# Patient Record
Sex: FEMALE | Race: WHITE | NOT HISPANIC OR LATINO | Employment: OTHER | URBAN - METROPOLITAN AREA
[De-identification: names, ages, dates, MRNs, and addresses within clinical notes are randomized per-mention and may not be internally consistent; named-entity substitution may affect disease eponyms.]

---

## 2018-12-24 ENCOUNTER — OFFICE VISIT (OUTPATIENT)
Dept: URGENT CARE | Facility: CLINIC | Age: 68
End: 2018-12-24
Payer: MEDICARE

## 2018-12-24 VITALS
RESPIRATION RATE: 16 BRPM | DIASTOLIC BLOOD PRESSURE: 75 MMHG | OXYGEN SATURATION: 99 % | TEMPERATURE: 99 F | WEIGHT: 196.38 LBS | SYSTOLIC BLOOD PRESSURE: 113 MMHG | HEART RATE: 102 BPM | BODY MASS INDEX: 33.71 KG/M2

## 2018-12-24 DIAGNOSIS — B02.7 DISSEMINATED HERPES ZOSTER: Primary | ICD-10-CM

## 2018-12-24 DIAGNOSIS — G51.8 NEURALGIC FACIAL PAIN: ICD-10-CM

## 2018-12-24 PROCEDURE — 99204 OFFICE O/P NEW MOD 45 MIN: CPT | Mod: S$GLB,,, | Performed by: NURSE PRACTITIONER

## 2018-12-24 RX ORDER — GABAPENTIN 300 MG/1
300 CAPSULE ORAL 3 TIMES DAILY PRN
Qty: 45 CAPSULE | Refills: 0 | Status: SHIPPED | OUTPATIENT
Start: 2018-12-24 | End: 2019-01-22

## 2018-12-24 RX ORDER — FENOFIBRATE 54 MG/1
54 TABLET ORAL DAILY
COMMUNITY
End: 2021-01-26 | Stop reason: SDUPTHER

## 2018-12-24 RX ORDER — ACYCLOVIR 400 MG/1
800 TABLET ORAL
Qty: 35 TABLET | Refills: 0 | Status: SHIPPED | OUTPATIENT
Start: 2018-12-24 | End: 2019-01-22

## 2018-12-24 RX ORDER — LISINOPRIL 5 MG/1
5 TABLET ORAL NIGHTLY
COMMUNITY
End: 2021-01-26 | Stop reason: SDUPTHER

## 2018-12-24 RX ORDER — ROSUVASTATIN CALCIUM 10 MG/1
10 TABLET, COATED ORAL DAILY
COMMUNITY
End: 2019-01-22

## 2018-12-24 NOTE — PATIENT INSTRUCTIONS
Shingles  Shingles is a viral infection caused by the same virus as chicken pox. Anyone who has had chicken pox may get shingles later in life. The virus stays in the body, but remains dormant (asleep). Shingles often occurs in older persons or persons with lowered immunity. But it can affect anyone at any age.  Shingles starts as a tingling patch of skin on one side of the body. Small, painful blisters may then appear. The rash does not spread to the rest of the body.  Exposure to shingles cannot cause shingles. However, it can cause chicken pox in anyone who has not had chicken pox or has not been vaccinated. The contagious period ends when all blisters have crusted over (generally about 2 weeks after the illness begins).  After the blisters heal, the affected skin may be sensitive or painful for months (neuralgia). This often gradually goes away.  A shingles vaccine is available. This can help prevent shingles or make it less painful. It is generally recommended for adults over the age of 60 who have had chicken pox in the past, but who have never had shingles. Adults over 60 who have had neither chicken pox nor shingles can prevent both diseases with the chicken pox vaccine. Ask your healthcare provider about these vaccines.  Home care  · Medicines may be prescribed to help relieve pain. Take these medicines as directed. Ask your healthcare provider or pharmacist before using over-the-counter medicines for helping treat pain and itching.  · In certain cases, antiviral medicines may be prescribed to reduce pain, shorten the illness, and prevent neuralgia. Take these medicines as directed.  · Compresses made from a solution of cool water mixed with cornstarch or baking soda may help relieve pain and itching.   · Gently wash skin daily with soap and water to help prevent infection.  Be certain to rinse off all of the soap, which can be irritating.  · Trim fingernails and try not to scratch. Scratching the sores  may leave scars.  · Stay home from work or school until all blisters have formed a crust and you are no longer contagious.  Follow-up care  Follow up with your healthcare provider or as directed by our staff.  When to seek medical advice  · Fever of 100.4°F (38°C) or higher, or as directed by your healthcare provider  · Affected skin is on the face or neck and any of the following occur:  ¨ Headache  ¨ Eye pain  ¨ Changes in vision  ¨ Sores near the eye  ¨ Weakness of facial muscles  · Pain, redness, or swelling of a joint  · Signs of skin infection: colored drainage from the sores, warmth, increasing redness, or increasing pain  Date Last Reviewed: 9/25/2015  © 5794-6810 [employer]. [address] [place] [address], [place] [address]. All rights reserved. This information is not intended as a substitute for professional medical care. Always follow your healthcare professional's instructions.

## 2019-01-22 ENCOUNTER — OFFICE VISIT (OUTPATIENT)
Dept: ORTHOPEDICS | Facility: CLINIC | Age: 69
End: 2019-01-22
Payer: MEDICARE

## 2019-01-22 VITALS
BODY MASS INDEX: 33.29 KG/M2 | WEIGHT: 195 LBS | DIASTOLIC BLOOD PRESSURE: 72 MMHG | SYSTOLIC BLOOD PRESSURE: 102 MMHG | HEART RATE: 75 BPM | HEIGHT: 64 IN

## 2019-01-22 DIAGNOSIS — M17.11 PRIMARY OSTEOARTHRITIS OF RIGHT KNEE: Primary | ICD-10-CM

## 2019-01-22 DIAGNOSIS — M17.12 OSTEOARTHROSIS, LOCALIZED, PRIMARY, KNEE, LEFT: ICD-10-CM

## 2019-01-22 PROCEDURE — 99213 OFFICE O/P EST LOW 20 MIN: CPT | Mod: 25,,, | Performed by: ORTHOPAEDIC SURGERY

## 2019-01-22 PROCEDURE — 20610 DRAIN/INJ JOINT/BURSA W/O US: CPT | Mod: LT,,, | Performed by: ORTHOPAEDIC SURGERY

## 2019-01-22 PROCEDURE — 73562 PR  X-RAY KNEE 3 VIEW: ICD-10-PCS | Mod: RT,,, | Performed by: ORTHOPAEDIC SURGERY

## 2019-01-22 PROCEDURE — 73562 X-RAY EXAM OF KNEE 3: CPT | Mod: RT,,, | Performed by: ORTHOPAEDIC SURGERY

## 2019-01-22 PROCEDURE — 20610 PR DRAIN/INJECT LARGE JOINT/BURSA: ICD-10-PCS | Mod: 51,RT,, | Performed by: ORTHOPAEDIC SURGERY

## 2019-01-22 PROCEDURE — 99213 PR OFFICE/OUTPT VISIT, EST, LEVL III, 20-29 MIN: ICD-10-PCS | Mod: 25,,, | Performed by: ORTHOPAEDIC SURGERY

## 2019-01-22 RX ORDER — ROSUVASTATIN CALCIUM 40 MG/1
40 TABLET, COATED ORAL NIGHTLY
COMMUNITY
Start: 2019-01-15 | End: 2021-01-26 | Stop reason: SDUPTHER

## 2019-01-22 RX ORDER — METHYLPREDNISOLONE ACETATE 40 MG/ML
40 INJECTION, SUSPENSION INTRA-ARTICULAR; INTRALESIONAL; INTRAMUSCULAR; SOFT TISSUE
Status: DISCONTINUED | OUTPATIENT
Start: 2019-01-22 | End: 2019-01-22 | Stop reason: HOSPADM

## 2019-01-22 RX ORDER — HYDROCODONE BITARTRATE AND ACETAMINOPHEN 5; 325 MG/1; MG/1
1 TABLET ORAL EVERY 8 HOURS PRN
Qty: 21 TABLET | Refills: 0 | Status: SHIPPED | OUTPATIENT
Start: 2019-01-22 | End: 2019-01-29

## 2019-01-22 RX ADMIN — METHYLPREDNISOLONE ACETATE 40 MG: 40 INJECTION, SUSPENSION INTRA-ARTICULAR; INTRALESIONAL; INTRAMUSCULAR; SOFT TISSUE at 04:01

## 2019-01-22 NOTE — PROGRESS NOTES
[facility]    Subjective:     Chief Complaint:   Chief Complaint   Patient presents with    Right Knee - Pain     Right knee pain x a while. States that she had an injection about a year ago and states that it did help and states that she would like to get an injection today. States that his pain is constant        Past Medical History:   Diagnosis Date    Diverticulitis     Hepatitis A     Hypertension        Past Surgical History:   Procedure Laterality Date    APPENDECTOMY      HYSTERECTOMY      TONSILLECTOMY         Current Outpatient Medications   Medication Sig    fenofibrate (TRICOR) 54 MG tablet Take 54 mg by mouth once daily.    lisinopril (PRINIVIL,ZESTRIL) 5 MG tablet Take 5 mg by mouth once daily.    rosuvastatin (CRESTOR) 40 MG Tab      No current facility-administered medications for this visit.        Review of patient's allergies indicates:   Allergen Reactions    Biaxin [clarithromycin]     Macrodantin [nitrofurantoin macrocrystalline]     Penicillins        History reviewed. No pertinent family history.    Social History     Socioeconomic History    Marital status:      Spouse name: Not on file    Number of children: Not on file    Years of education: Not on file    Highest education level: Not on file   Social Needs    Financial resource strain: Not on file    Food insecurity - worry: Not on file    Food insecurity - inability: Not on file    Transportation needs - medical: Not on file    Transportation needs - non-medical: Not on file   Occupational History    Not on file   Tobacco Use    Smoking status: Current Some Day Smoker     Years: 5.00     Types: Cigarettes    Smokeless tobacco: Never Used   Substance and Sexual Activity    Alcohol use: No    Drug use: No    Sexual activity: Not on file   Other Topics Concern    Not on file   Social History Narrative    Not on file       History of present illness: Patient comes in today for her bilateral  knees. She is having achy pain in both her knees. This is been long-standing. This been going on for years.  Review of Systems:    Constitution: Negative for chills, fever, and sweats.  Negative for unexplained weight loss.    HENT:  Negative for headaches and blurry vision.    Cardiovascular:Negative for chest pain or irregular heart beat. Negative for hypertension.    Respiratory:  Negative for cough and shortness of breath.    Gastrointestinal: Negative for abdominal pain, heartburn, melena, nausea, and vomitting.    Genitourinary:  Negative bladder incontinence and dysuria.    Musculoskeletal:  See HPI for details.     Neurological: Negative for numbness.    Psychiatric/Behavioral: Negative for depression.  The patient is not nervous/anxious.      Endocrine: Negative for polyuria    Hematologic/Lymphatic: Negative for bleeding problem.  Does not bruise/bleed easily.    Skin: Negative for poor would healing and rash    Objective:      Physical Examination:    Vital Signs:    Vitals:    01/22/19 1529   BP: 102/72   Pulse: 75       Body mass index is 33.47 kg/m².    This a well-developed, well nourished patient in no acute distress.  They are alert and oriented and cooperative to examination.        Patient has bilateral varus deformities. She has crepitus in the right is the left. She has 1+ effusions bilaterally. Straight leg raises are negative.  Pertinent New Results:    XRAY Report / Interpretation:   AP lateral and sunrise views of her demonstrate 3 compartment spurring advanced arthritis of her bilateral knees    Assessment/Plan:      Apparently arthritis bilateral knees. I injected both knees with Depo-Medrol and lidocaine. She will follow-up when necessary      This note was created using Dragon voice recognition software that occasionally misinterpreted phrases or words.

## 2019-01-22 NOTE — PROCEDURES
Large Joint Aspiration/Injection: R knee  Date/Time: 1/22/2019 4:13 PM  Performed by: Robby Babb MD  Authorized by: Robby Babb MD     Consent Done?:  Yes (Verbal)  Indications:  Pain  Procedure site marked: Yes    Timeout: Prior to procedure the correct patient, procedure, and site was verified      Location:  Knee  Site:  R knee  Prep: Patient was prepped and draped in usual sterile fashion    Needle size:  25 G  Medications:  40 mg methylPREDNISolone acetate 40 mg/mL; 40 mg methylPREDNISolone acetate 40 mg/mL  Patient tolerance:  Patient tolerated the procedure well with no immediate complications  Large Joint Aspiration/Injection: L knee  Date/Time: 1/22/2019 4:13 PM  Performed by: Robby Babb MD  Authorized by: Robby Babb MD     Consent Done?:  Yes (Verbal)  Indications:  Pain  Procedure site marked: Yes    Timeout: Prior to procedure the correct patient, procedure, and site was verified      Location:  Knee  Site:  L knee  Prep: Patient was prepped and draped in usual sterile fashion    Needle size:  25 G  Medications:  40 mg methylPREDNISolone acetate 40 mg/mL; 40 mg methylPREDNISolone acetate 40 mg/mL  Patient tolerance:  Patient tolerated the procedure well with no immediate complications

## 2019-04-02 ENCOUNTER — OFFICE VISIT (OUTPATIENT)
Dept: ORTHOPEDICS | Facility: CLINIC | Age: 69
End: 2019-04-02
Payer: MEDICARE

## 2019-04-02 VITALS
HEIGHT: 64 IN | HEART RATE: 87 BPM | WEIGHT: 200 LBS | BODY MASS INDEX: 34.15 KG/M2 | SYSTOLIC BLOOD PRESSURE: 128 MMHG | DIASTOLIC BLOOD PRESSURE: 68 MMHG

## 2019-04-02 DIAGNOSIS — M70.61 GREATER TROCHANTERIC BURSITIS, RIGHT: Primary | ICD-10-CM

## 2019-04-02 DIAGNOSIS — M17.11 PRIMARY OSTEOARTHRITIS OF RIGHT KNEE: ICD-10-CM

## 2019-04-02 PROCEDURE — 73502 X-RAY EXAM HIP UNI 2-3 VIEWS: CPT | Mod: ,,, | Performed by: ORTHOPAEDIC SURGERY

## 2019-04-02 PROCEDURE — 73502 PR X-RAY EXAM HIP UNI 2-3 VIEWS: ICD-10-PCS | Mod: ,,, | Performed by: ORTHOPAEDIC SURGERY

## 2019-04-02 PROCEDURE — 20610 LARGE JOINT ASPIRATION/INJECTION: R GREATER TROCHANTERIC BURSA: ICD-10-PCS | Mod: RT,,, | Performed by: ORTHOPAEDIC SURGERY

## 2019-04-02 PROCEDURE — 20610 DRAIN/INJ JOINT/BURSA W/O US: CPT | Mod: RT,,, | Performed by: ORTHOPAEDIC SURGERY

## 2019-04-02 PROCEDURE — 99214 OFFICE O/P EST MOD 30 MIN: CPT | Mod: 25,,, | Performed by: ORTHOPAEDIC SURGERY

## 2019-04-02 PROCEDURE — 99214 PR OFFICE/OUTPT VISIT, EST, LEVL IV, 30-39 MIN: ICD-10-PCS | Mod: 25,,, | Performed by: ORTHOPAEDIC SURGERY

## 2019-04-02 RX ORDER — METHYLPREDNISOLONE ACETATE 40 MG/ML
40 INJECTION, SUSPENSION INTRA-ARTICULAR; INTRALESIONAL; INTRAMUSCULAR; SOFT TISSUE
Status: DISCONTINUED | OUTPATIENT
Start: 2019-04-02 | End: 2019-04-02 | Stop reason: HOSPADM

## 2019-04-02 RX ORDER — CITALOPRAM 10 MG/1
10 TABLET ORAL DAILY
COMMUNITY
End: 2020-01-30

## 2019-04-02 RX ADMIN — METHYLPREDNISOLONE ACETATE 40 MG: 40 INJECTION, SUSPENSION INTRA-ARTICULAR; INTRALESIONAL; INTRAMUSCULAR; SOFT TISSUE at 03:04

## 2019-04-02 NOTE — PROCEDURES
Large Joint Aspiration/Injection: R greater trochanteric bursa  Date/Time: 4/2/2019 3:55 PM  Performed by: Robby Babb MD  Authorized by: Robby Babb MD     Consent Done?:  Yes (Verbal)  Indications:  Pain  Procedure site marked: Yes    Timeout: Prior to procedure the correct patient, procedure, and site was verified      Location:  Hip  Site:  R greater trochanteric bursa  Prep: Patient was prepped and draped in usual sterile fashion    Needle size:  25 G  Medications:  40 mg methylPREDNISolone acetate 40 mg/mL; 40 mg methylPREDNISolone acetate 40 mg/mL  Patient tolerance:  Patient tolerated the procedure well with no immediate complications

## 2019-04-02 NOTE — PROGRESS NOTES
[facility]    Subjective:     Chief Complaint:   Chief Complaint   Patient presents with    Right Shoulder - Pain     Right hip pain x 1 month. States that she does have pain that radiates to the groin and states that she has shooting pains in the hip. States that her pain is worse at night.        Past Medical History:   Diagnosis Date    Diverticulitis     Hepatitis A     Hypertension        Past Surgical History:   Procedure Laterality Date    APPENDECTOMY      HYSTERECTOMY      TONSILLECTOMY         Current Outpatient Medications   Medication Sig    citalopram (CELEXA) 10 MG tablet Take 10 mg by mouth once daily.    fenofibrate (TRICOR) 54 MG tablet Take 54 mg by mouth once daily.    lisinopril (PRINIVIL,ZESTRIL) 5 MG tablet Take 5 mg by mouth once daily.    rosuvastatin (CRESTOR) 40 MG Tab      No current facility-administered medications for this visit.        Review of patient's allergies indicates:   Allergen Reactions    Biaxin [clarithromycin]     Macrodantin [nitrofurantoin macrocrystalline]     Penicillins        History reviewed. No pertinent family history.    Social History     Socioeconomic History    Marital status:      Spouse name: Not on file    Number of children: Not on file    Years of education: Not on file    Highest education level: Not on file   Occupational History    Not on file   Social Needs    Financial resource strain: Not on file    Food insecurity:     Worry: Not on file     Inability: Not on file    Transportation needs:     Medical: Not on file     Non-medical: Not on file   Tobacco Use    Smoking status: Current Some Day Smoker     Years: 5.00     Types: Cigarettes    Smokeless tobacco: Never Used   Substance and Sexual Activity    Alcohol use: No    Drug use: No    Sexual activity: Not on file   Lifestyle    Physical activity:     Days per week: Not on file     Minutes per session: Not on file    Stress: Not on file    Relationships    Social connections:     Talks on phone: Not on file     Gets together: Not on file     Attends Yarsani service: Not on file     Active member of club or organization: Not on file     Attends meetings of clubs or organizations: Not on file     Relationship status: Not on file   Other Topics Concern    Not on file   Social History Narrative    Not on file       History of present illness: Patient comes in today for right hip pain. She's also here for her knee. Her right knee is giving her tremendous problems. The Depo-Medrol injections really have not helped and she wants to have it fixed. Her right hip bothers her around the trochanteric region for several months.      Review of Systems:    Constitution: Negative for chills, fever, and sweats.  Negative for unexplained weight loss.    HENT:  Negative for headaches and blurry vision.    Cardiovascular:Negative for chest pain or irregular heart beat. Negative for hypertension.    Respiratory:  Negative for cough and shortness of breath.    Gastrointestinal: Negative for abdominal pain, heartburn, melena, nausea, and vomitting.    Genitourinary:  Negative bladder incontinence and dysuria.    Musculoskeletal:  See HPI for details.     Neurological: Negative for numbness.    Psychiatric/Behavioral: Negative for depression.  The patient is not nervous/anxious.      Endocrine: Negative for polyuria    Hematologic/Lymphatic: Negative for bleeding problem.  Does not bruise/bleed easily.    Skin: Negative for poor would healing and rash    Objective:      Physical Examination:    Vital Signs:    Vitals:    04/02/19 1458   BP: 128/68   Pulse: 87       Body mass index is 34.33 kg/m².    This a well-developed, well nourished patient in no acute distress.  They are alert and oriented and cooperative to examination.        Patient is very tender around the right greater trochanter. His full range of motion of the right hip. Full range of motion the left hip.  She is full range of motion of her lumbar spine. Her right knee has a 2+ effusion. She's very tender the lateral joint line.  Pertinent New Results:    XRAY Report / Interpretation:   AP pelvis demonstrates normal bony anatomy no fractures or subluxations    Assessment/Plan:      Right trochanteric bursitis. I injected the trochanteric bursa with Depo-Medrol and lidocaine. In terms of the knee she has bone-on-bone arthritis. She has failed Depo-Medrol of offered her a total knee arthroplasty. Risks and benefits of discussed with her in great detail. She understood and wished to proceed      This note was created using Dragon voice recognition software that occasionally misinterpreted phrases or words.

## 2019-04-08 DIAGNOSIS — M17.11 PRIMARY OSTEOARTHRITIS OF RIGHT KNEE: Primary | ICD-10-CM

## 2019-04-08 RX ORDER — MUPIROCIN 20 MG/G
OINTMENT TOPICAL
Status: CANCELLED | OUTPATIENT
Start: 2019-04-08

## 2019-04-08 RX ORDER — SODIUM CHLORIDE 9 MG/ML
INJECTION, SOLUTION INTRAVENOUS CONTINUOUS
Status: CANCELLED | OUTPATIENT
Start: 2019-04-08

## 2019-04-11 ENCOUNTER — HOSPITAL ENCOUNTER (OUTPATIENT)
Dept: RADIOLOGY | Facility: HOSPITAL | Age: 69
Discharge: HOME | End: 2019-04-11
Attending: ORTHOPAEDIC SURGERY
Payer: MEDICARE

## 2019-04-11 ENCOUNTER — TELEPHONE (OUTPATIENT)
Dept: ORTHOPEDICS | Facility: CLINIC | Age: 69
End: 2019-04-11

## 2019-04-11 ENCOUNTER — HOSPITAL ENCOUNTER (OUTPATIENT)
Dept: PREADMISSION TESTING | Facility: HOSPITAL | Age: 69
Discharge: HOME | End: 2019-04-11
Attending: ORTHOPAEDIC SURGERY
Payer: MEDICARE

## 2019-04-11 VITALS — BODY MASS INDEX: 31.65 KG/M2 | WEIGHT: 190 LBS | HEIGHT: 65 IN

## 2019-04-11 DIAGNOSIS — M17.11 PRIMARY OSTEOARTHRITIS OF RIGHT KNEE: Primary | ICD-10-CM

## 2019-04-11 LAB
ABO + RH BLD: NORMAL
ALBUMIN SERPL BCP-MCNC: 4.1 G/DL (ref 3.5–5.2)
ALP SERPL-CCNC: 79 U/L (ref 55–135)
ALT SERPL W/O P-5'-P-CCNC: 13 U/L (ref 10–44)
ANION GAP SERPL CALC-SCNC: 9 MMOL/L (ref 8–16)
AST SERPL-CCNC: 17 U/L (ref 10–40)
BASOPHILS # BLD AUTO: 0 K/UL (ref 0–0.2)
BASOPHILS NFR BLD: 0.4 % (ref 0–1.9)
BILIRUB SERPL-MCNC: 0.6 MG/DL (ref 0.1–1)
BILIRUB UR QL STRIP: NEGATIVE
BLD GP AB SCN CELLS X3 SERPL QL: NORMAL
BUN SERPL-MCNC: 34 MG/DL (ref 8–23)
CALCIUM SERPL-MCNC: 9.7 MG/DL (ref 8.7–10.5)
CHLORIDE SERPL-SCNC: 109 MMOL/L (ref 95–110)
CLARITY UR: CLEAR
CO2 SERPL-SCNC: 22 MMOL/L (ref 23–29)
COLOR UR: YELLOW
CREAT SERPL-MCNC: 1.1 MG/DL (ref 0.5–1.4)
DIFFERENTIAL METHOD: ABNORMAL
EOSINOPHIL # BLD AUTO: 0.2 K/UL (ref 0–0.5)
EOSINOPHIL NFR BLD: 2 % (ref 0–8)
ERYTHROCYTE [DISTWIDTH] IN BLOOD BY AUTOMATED COUNT: 13.1 % (ref 11.5–14.5)
EST. GFR  (AFRICAN AMERICAN): >60 ML/MIN/1.73 M^2
EST. GFR  (NON AFRICAN AMERICAN): 52 ML/MIN/1.73 M^2
GLUCOSE SERPL-MCNC: 68 MG/DL (ref 70–110)
GLUCOSE UR QL STRIP: NEGATIVE
HCT VFR BLD AUTO: 38.2 % (ref 37–48.5)
HGB BLD-MCNC: 12.7 G/DL (ref 12–16)
HGB UR QL STRIP: NEGATIVE
KETONES UR QL STRIP: NEGATIVE
LEUKOCYTE ESTERASE UR QL STRIP: NEGATIVE
LYMPHOCYTES # BLD AUTO: 3.5 K/UL (ref 1–4.8)
LYMPHOCYTES NFR BLD: 41.4 % (ref 18–48)
MCH RBC QN AUTO: 29.6 PG (ref 27–31)
MCHC RBC AUTO-ENTMCNC: 33.3 G/DL (ref 32–36)
MCV RBC AUTO: 89 FL (ref 82–98)
MONOCYTES # BLD AUTO: 0.7 K/UL (ref 0.3–1)
MONOCYTES NFR BLD: 8.4 % (ref 4–15)
NEUTROPHILS # BLD AUTO: 4.1 K/UL (ref 1.8–7.7)
NEUTROPHILS NFR BLD: 47.8 % (ref 38–73)
NITRITE UR QL STRIP: NEGATIVE
PH UR STRIP: 5 [PH] (ref 5–8)
PLATELET # BLD AUTO: 301 K/UL (ref 150–350)
PMV BLD AUTO: 7.5 FL (ref 9.2–12.9)
POTASSIUM SERPL-SCNC: 4.6 MMOL/L (ref 3.5–5.1)
PROT SERPL-MCNC: 7.5 G/DL (ref 6–8.4)
PROT UR QL STRIP: NEGATIVE
RBC # BLD AUTO: 4.28 M/UL (ref 4–5.4)
SODIUM SERPL-SCNC: 140 MMOL/L (ref 136–145)
SP GR UR STRIP: >=1.03 (ref 1–1.03)
URN SPEC COLLECT METH UR: ABNORMAL
UROBILINOGEN UR STRIP-ACNC: NEGATIVE EU/DL
WBC # BLD AUTO: 8.6 K/UL (ref 3.9–12.7)

## 2019-04-11 PROCEDURE — 86850 RBC ANTIBODY SCREEN: CPT

## 2019-04-11 PROCEDURE — 71046 X-RAY EXAM CHEST 2 VIEWS: CPT | Mod: 26,,, | Performed by: RADIOLOGY

## 2019-04-11 PROCEDURE — 80053 COMPREHEN METABOLIC PANEL: CPT

## 2019-04-11 PROCEDURE — 99900104 DSU ONLY-NO CHARGE-EA ADD'L HR (STAT)

## 2019-04-11 PROCEDURE — 81003 URINALYSIS AUTO W/O SCOPE: CPT

## 2019-04-11 PROCEDURE — 87081 CULTURE SCREEN ONLY: CPT

## 2019-04-11 PROCEDURE — 36415 COLL VENOUS BLD VENIPUNCTURE: CPT

## 2019-04-11 PROCEDURE — 99900103 DSU ONLY-NO CHARGE-INITIAL HR (STAT)

## 2019-04-11 PROCEDURE — 93005 ELECTROCARDIOGRAM TRACING: CPT

## 2019-04-11 PROCEDURE — 71046 XR CHEST PA AND LATERAL: ICD-10-PCS | Mod: 26,,, | Performed by: RADIOLOGY

## 2019-04-11 PROCEDURE — 71046 X-RAY EXAM CHEST 2 VIEWS: CPT | Mod: TC,FY

## 2019-04-11 PROCEDURE — 85025 COMPLETE CBC W/AUTO DIFF WBC: CPT

## 2019-04-11 RX ORDER — UBIDECARENONE 30 MG
30 CAPSULE ORAL DAILY
COMMUNITY

## 2019-04-11 RX ORDER — ACETAMINOPHEN 500 MG
500 TABLET ORAL EVERY 6 HOURS PRN
COMMUNITY
End: 2021-01-26

## 2019-04-11 NOTE — DISCHARGE INSTRUCTIONS

## 2019-04-11 NOTE — TELEPHONE ENCOUNTER
Called back pt, as per Dr. Babb we can change her over to [facility]. She wants to think about all of this as she is upset over what she has been told. I will call  her back tomorrow

## 2019-04-11 NOTE — PRE ADMISSION SCREENING
JOINT CAMP ASSESSMENT    Name Shantal Orourke   MRN [id number]    Age/Sex 68 y.o. female    Surgeon Dr. Robby Babb   Joint Camp Date 4/11/2019   Surgery Date    Procedure    Insurance Payor: MEDICARE / Plan: MEDICARE PART A & B / Product Type: Government /    Care Team Patient Care Team:  Rubens Alvarez MD as PCP - General (Internal Medicine)    Pharmacy   [facility] [address] [place], [place] - [address].  [address] [place] [address].  [facility] [address]  Phone: [phone] Fax: [phone]     AM-PAC Score   23   Risk Assessment Score 5     Past Medical History:   Diagnosis Date    Diverticulitis     Encounter for blood transfusion     Hemorrhage     from rectum    Hepatitis A     High cholesterol     Hypertension        Past Surgical History:   Procedure Laterality Date    APPENDECTOMY      COLONOSCOPY      HYSTERECTOMY      TONSILLECTOMY           Home Enviroment     Living Arrangement: Lives alone  Home Environment: 1-story house/ trailer, number of outside stairs: none, tub-shower  Home Safety Concerns: unremarkable    DISCHARGE CAREGIVER/SUPPORT SYSTEM     Identified post-op caregiver: Patient lives alone and daughter is only able to assist patient during the day.  Patient's caregiver(s) will be able to provide physical assistance. Patient will not have someone to assist overnight.      Caregiver present at pre-op interview:  No      PRE-OPERATIVE FUNCTIONAL STATUS     Employment: Employed full time    Pre-op Functional Status: Patient is independent with mobility/ambulation, transfers, ADL's, IADL's.    Use of assistive device for ambulation: none  ADL: self care  ADL Limitations: difficulty with walking  Medical Restrictions: None    POTENTIAL BARRIERS TO DISCHARGE/POTENTIAL POST-OP COMPLICATIONS     Hx of rectal hemorrhage with blood transfusion. Pt does not have anyone to stay overnight with her at home.      DISCHARGE PLAN     Expected LOS of 2 days or less for joint replacement discussed with  patient. We also discussed a discharge path of HH for approximately two weeks with a transition to outpatient PT on the third week given no post-op complications.      Patient in agreement with discharge plan: Yes    Discharge to: Discharge home with home health (PT/OT) x2 weeks with transition to outpatient PT     HH:  [facility]     OP PT: [facility]     Home DME: none    Needed DME at D/C: rolling walker, bedside commode and tub transfer bench     Rx: Per Dr. Babb at discharge     Meds to Beds: N/A  Patient expected to discharge on Aspirin 325mg by mouth twice daily for DVT prophylaxis.

## 2019-04-11 NOTE — PRE ADMISSION SCREENING
"               [facility] Risk Assessment Scale    Patient Name: Shantal Orourke  YOB: 1950  MRN: [id number]            RIsk Factor Measure Recommendation Patient Data Scale/Score   BMI >40 Reconsider surgery, weight loss   Estimated body mass index is 31.62 kg/m² as calculated from the following:    Height as of this encounter: 5' 5" (1.651 m).    Weight as of this encounter: 86.2 kg (190 lb).   [] 0 = 1 - 24.9  [] 1 = 25-29.9  [x] 2 = 30-34.9  [] 3 = 35-39.9  [] 4 = 40-44.9  [] 5 = 45-99.9   Hemoglobin AIC (if applicable) >9 Delay surgery until DM under control  Refer for:  · Nutrition Therapy  · Exercise   · Medication    No results found for: LABA1C, HGBA1C    Lab Results   Component Value Date    GLU 68 (L) 04/11/2019      [] 0 = 4.0-5.6  [] 1 = 5.7-6.4  [] 2 = 6.5-6.9  [] 3 = 7.0-7.9  [] 4 = 8.0-8.9  [] 5 = 9.0-12   Hemoglobin (Anemia) <9 Delay surgery   Correct anemia Lab Results   Component Value Date    HGB 12.7 04/11/2019    [] 20 - <9.0                    Albumin <3 Delay surgery &Workup Lab Results   Component Value Date    ALBUMIN 4.1 04/11/2019    [] 20 - <3.0   Smoking Cessation >4 Weeks Delay Surgery  Refer to OP Cessation Class    Former Smoker [] 20 - current smoker                                _____ PPD                    Hx of MI, PE, Arrhythmia, CVA, DVT <30 Days Delay Surgery    N/A [] 20      Infection Variable Delay surgery and re-evaluate   N/A [] 20 - recent/current infection     Depression (PHQ) >10 out of 27 Delay Surgery and re-evaluate  Medication  Counseling              [x] 0     []1     []2     []3      []4      [] 5                    (1-4)      (5-9)  (10-14)  (15-19)   (20-27)     Memory Impairment & Memory loss (Mini-Cog Screening Tool) Advanced dementia and/or Parkinson's Reconsider surgery     [x] 0     []1     []2     []3     []4     [] 5     Physical Conditioning (Modified AM-PAC Per Physical Therapy at [facility]) Unable to ambulate on day of surgery Delay surgery and " re-evaluate  Pre-Rehabilitation   (PT evaluation)       [x]  0   []4       []8     []12        []16     []20       (<20%)   (<40%)   (<60%)   (<80% )    (>80%)     Home Environment/Caregiver support  (Per /Navigator Interview)    Availability of basic services and/or approprate assistance during post-operative period Delay surgery and re-evaluate  Safe home environment  Health   1 week post-surgery  Transportation  availability  Ability to obtain DME/Medications post-op    [x] 0     []1     []2     []3     []4     [] 5  [] 0     []1     []2     [x]3     []4     [] 5  [x] 0     []1     []2     []3     []4     [] 5  [x] 0     []1     []2     []3     []4     [] 5         MD Contact:  Comments:No overnight assistance at home  Total Score:  5

## 2019-04-11 NOTE — TELEPHONE ENCOUNTER
Spoke with pt, she wants to cancel her surgery as she was told today at preop that because she has no one with her, they might not do surgery.. I asked her to let me speak with Dr. Babb and I will get back with her.

## 2019-04-11 NOTE — TELEPHONE ENCOUNTER
----- Message from Elmira Jenkins sent at 4/11/2019  2:15 PM CDT -----  Contact: patient  She is rethinking doing surgery and wants to speak with you, call her at [phone].

## 2019-04-11 NOTE — PRE ADMISSION SCREENING
Patient Name: Shantal Orourke  YOB: 1950   MRN: [id number]     [facility]   Basic Mobility Inpatient Short Form 6 Clicks         How much difficulty does the patient currently have  Unable  A Lot  A Little  None      1. Turning over in bed (including adjusting bedclothes, sheets and blankets)?     1 []    2 []    3 []    4 [x]        2. Sitting down on and standing up from a chair with arms (e.g., wheelchair, bedside commode, etc.)     1 []  2 []  3 []     4 [x]      3. Moving from lying on back to sitting on the side of the bed?     1 []  2 []  3 [x]    4 []    How much help from another person does the patient currently need  Total  A Lot  A Little  None      4. Moving to and from a bed to a chair (including a wheelchair)?    1 []  2 []  3 []    4 [x]      5. Need to walk in hospital room?    1 []  2 []  3 []    4 [x]      6. Climbing 3-5 steps with a railing?    1 []  2 []  3 []    4 [x]     Raw Score:      23            CMS 0-100% Score:    11.20        %   Standardized Score:  6.22             CMS Modifier:   CI                                       [facility]   Basic Mobility Inpatient Short Form 6 Clicks Score Conversion Table*         *Use this form to convert -PAC Basic Mobility Inpatient Raw Scores.   [facility] Inpatient Basic Mobility Short Form Scoring Example   1. Add the number values associated with the response to each item. For example, items totals yield a Raw Score of 21.   2. Match the raw score to the t-Scale scores (t-Scale score = 50.25, SE = 4.69).   3. Find the associated CMS % (CMS % = 28.97%).   4. Locate the correct CMS Functional Modifier Code, or G Code (G code = CJ)     NOTE: Each -PAC Short Form has a separate conversion table. Make sure that you use the correct conversion table.       Instruction Manual - page 45 contains conversion table

## 2019-04-12 DIAGNOSIS — M17.11 PRIMARY OSTEOARTHRITIS OF RIGHT KNEE: Primary | ICD-10-CM

## 2019-04-12 NOTE — TELEPHONE ENCOUNTER
Spoke with pt, she will have her surgery done at [facility] on 5/1/19. All paperwork and labs faxed over

## 2019-04-13 LAB — MRSA SPEC QL CULT: NORMAL

## 2019-04-24 ENCOUNTER — TELEPHONE (OUTPATIENT)
Dept: ORTHOPEDICS | Facility: CLINIC | Age: 69
End: 2019-04-24

## 2019-04-24 RX ORDER — MUPIROCIN 20 MG/G
1 OINTMENT TOPICAL 2 TIMES DAILY
COMMUNITY
Start: 2019-04-24 | End: 2019-04-28

## 2019-04-24 RX ORDER — CHLORHEXIDINE GLUCONATE 40 MG/ML
1 SOLUTION TOPICAL DAILY PRN
COMMUNITY
Start: 2019-04-24 | End: 2019-04-28

## 2019-04-24 NOTE — TELEPHONE ENCOUNTER
Pt is positive for MRSA. Meds called in [facility] Pt notified by Kajal at [facility]

## 2019-05-03 LAB
APTT PPP: 32 SEC (ref 26.2–34.7)
BUN SERPL-MCNC: 15 MG/DL (ref 8–20)
CALCIUM SERPL-MCNC: 8.7 MG/DL (ref 7.7–10.4)
CHLORIDE: 104 MMOL/L (ref 98–110)
CO2 SERPL-SCNC: 27.7 MMOL/L (ref 22.8–31.6)
CREATININE: 0.65 MG/DL (ref 0.6–1.4)
GLUCOSE: 148 MG/DL (ref 70–99)
HCT VFR BLD AUTO: 29.2 % (ref 36–48)
HGB BLD-MCNC: 9.4 G/DL (ref 12–15)
INR PPP: 1.2
MAGNESIUM SERPL-MCNC: 1.9 MG/DL (ref 1.5–2.6)
MCH RBC QN AUTO: 30.3 PG (ref 25–35)
MCHC RBC AUTO-ENTMCNC: 32.2 G/DL (ref 31–36)
MCV RBC AUTO: 94.2 FL (ref 79–98)
NUCLEATED RBCS: 0 %
PHOSPHATE FLD-MCNC: 2.4 MG/DL (ref 2.5–4.9)
PLATELET # BLD AUTO: 260 K/UL (ref 140–440)
POTASSIUM SERPL-SCNC: 4.5 MMOL/L (ref 3.5–5)
PROTHROMBIN TIME: 14.5 SEC (ref 11.7–14)
RBC # BLD AUTO: 3.1 M/UL (ref 3.5–5.5)
SODIUM: 139 MMOL/L (ref 134–144)
WBC # BLD AUTO: 13.5 K/UL (ref 5–10)

## 2019-05-06 ENCOUNTER — TELEPHONE (OUTPATIENT)
Dept: ORTHOPEDICS | Facility: CLINIC | Age: 69
End: 2019-05-06

## 2019-05-06 NOTE — TELEPHONE ENCOUNTER
----- Message from Elmira Jenkins sent at 5/6/2019  9:34 AM CDT -----  Contact: patient  I gave her her post op follow up and she stated it needed to be changed to this week, also patient cannot get her refill on Tramadol because it was filled out wrong according to the patient, call her back at [phone].

## 2019-05-06 NOTE — TELEPHONE ENCOUNTER
Called and spoke with pt, she stated Dr. Babb gave her a script for Percocet and Tramadol but [facility] would not fill both. I will speak with Dr. Babb tomorrow and get back with her. She also stated that the nurse told her to come in next week. I explained she needs to keep the appointment we gave her, next week is too soon.

## 2019-05-16 ENCOUNTER — OFFICE VISIT (OUTPATIENT)
Dept: ORTHOPEDICS | Facility: CLINIC | Age: 69
End: 2019-05-16
Payer: MEDICARE

## 2019-05-16 VITALS
HEART RATE: 93 BPM | HEIGHT: 65 IN | BODY MASS INDEX: 31.65 KG/M2 | SYSTOLIC BLOOD PRESSURE: 110 MMHG | WEIGHT: 190 LBS | DIASTOLIC BLOOD PRESSURE: 64 MMHG

## 2019-05-16 DIAGNOSIS — Z96.651 STATUS POST TOTAL KNEE REPLACEMENT, RIGHT: Primary | ICD-10-CM

## 2019-05-16 PROCEDURE — 99024 POSTOP FOLLOW-UP VISIT: CPT | Mod: POP,,, | Performed by: ORTHOPAEDIC SURGERY

## 2019-05-16 PROCEDURE — 99024 PR POST-OP FOLLOW-UP VISIT: ICD-10-PCS | Mod: POP,,, | Performed by: ORTHOPAEDIC SURGERY

## 2019-05-16 RX ORDER — TRAMADOL HYDROCHLORIDE 50 MG/1
50 TABLET ORAL EVERY 4 HOURS PRN
Qty: 42 TABLET | Refills: 0 | Status: SHIPPED | OUTPATIENT
Start: 2019-05-16 | End: 2019-05-23

## 2019-05-16 NOTE — PROGRESS NOTES
[facility] ORTHOPEDICS POST-OP NOTE    Subjective:           Chief Complaint:   Chief Complaint   Patient presents with    Right Knee - Post-op Evaluation     Right TKA 5.1.19. Suture removal. States that her knee is doing pretty good.        Past Medical History:   Diagnosis Date    Diverticulitis     Encounter for blood transfusion     Hemorrhage     from rectum    Hepatitis A     High cholesterol     Hypertension        Past Surgical History:   Procedure Laterality Date    APPENDECTOMY      COLONOSCOPY      HYSTERECTOMY      JOINT REPLACEMENT      KNEE SURGERY  05/01/2019    Right TKA    TONSILLECTOMY         Current Outpatient Medications   Medication Sig    acetaminophen (TYLENOL EXTRA STRENGTH) 500 MG tablet Take 500 mg by mouth every 6 (six) hours as needed for Pain.    citalopram (CELEXA) 10 MG tablet Take 10 mg by mouth once daily.    co-enzyme Q-10 30 mg capsule Take 30 mg by mouth once daily.    fenofibrate (TRICOR) 54 MG tablet Take 54 mg by mouth once daily.    lisinopril (PRINIVIL,ZESTRIL) 5 MG tablet Take 5 mg by mouth every evening.     rosuvastatin (CRESTOR) 40 MG Tab Take 40 mg by mouth every evening.      No current facility-administered medications for this visit.        Review of patient's allergies indicates:   Allergen Reactions    Biaxin [clarithromycin]     Macrodantin [nitrofurantoin macrocrystalline]     Penicillins        History reviewed. No pertinent family history.    Social History     Socioeconomic History    Marital status:      Spouse name: Not on file    Number of children: Not on file    Years of education: Not on file    Highest education level: Not on file   Occupational History    Not on file   Social Needs    Financial resource strain: Not on file    Food insecurity:     Worry: Not on file     Inability: Not on file    Transportation needs:     Medical: Not on file     Non-medical: Not on file   Tobacco Use    Smoking status: Former  Smoker     Years: 5.00     Types: Cigarettes    Smokeless tobacco: Never Used   Substance and Sexual Activity    Alcohol use: No    Drug use: No    Sexual activity: Not on file   Lifestyle    Physical activity:     Days per week: Not on file     Minutes per session: Not on file    Stress: Not on file   Relationships    Social connections:     Talks on phone: Not on file     Gets together: Not on file     Attends Hindu service: Not on file     Active member of club or organization: Not on file     Attends meetings of clubs or organizations: Not on file     Relationship status: Not on file   Other Topics Concern    Not on file   Social History Narrative    Not on file       History of present illness: Patient comes in today for the right knee. She is doing very well. She denies any shortness of breath or calf pain.      Review of Systems:    Musculoskeletal:  See HPI      Objective:        Physical Examination:    Vital Signs:    Vitals:    05/16/19 1031   BP: 110/64   Pulse: 93       Body mass index is 31.62 kg/m².    This a well-developed, well nourished patient in no acute distress.  They are alert and oriented and cooperative to examination.        Wounds are clean dry and intact. Her calf is soft. She's neurovascularly intact the foot.  Pertinent New Results:    XRAY Report / Interpretation:       Assessment/Plan:      Stable following right total knee. Continue physical therapy now as an outpatient. Continue aspirin for 2 more weeks. Follow-up in 4 weeks    This note was created using Dragon voice recognition software that occasionally misinterpreted phrases or words.

## 2019-05-20 ENCOUNTER — TELEPHONE (OUTPATIENT)
Dept: ORTHOPEDICS | Facility: CLINIC | Age: 69
End: 2019-05-20

## 2019-05-20 DIAGNOSIS — Z96.651 STATUS POST TOTAL KNEE REPLACEMENT, RIGHT: Primary | ICD-10-CM

## 2019-05-20 NOTE — TELEPHONE ENCOUNTER
----- Message from Lis Pettit sent at 5/17/2019  2:13 PM CDT -----  Contact: jaquelin PT with [facility]   Called they want her to start out patient PT at [facility]# [phone]

## 2019-05-20 NOTE — TELEPHONE ENCOUNTER
----- Message from Breanne Carroll sent at 5/20/2019  9:04 AM CDT -----  Contact: Patient  Please call patient and let her know where she is having PT at.

## 2019-06-04 ENCOUNTER — OFFICE VISIT (OUTPATIENT)
Dept: ORTHOPEDICS | Facility: CLINIC | Age: 69
End: 2019-06-04
Payer: MEDICARE

## 2019-06-04 VITALS
SYSTOLIC BLOOD PRESSURE: 103 MMHG | BODY MASS INDEX: 31.65 KG/M2 | DIASTOLIC BLOOD PRESSURE: 65 MMHG | HEIGHT: 65 IN | HEART RATE: 100 BPM | WEIGHT: 190 LBS

## 2019-06-04 DIAGNOSIS — Z96.651 STATUS POST TOTAL KNEE REPLACEMENT, RIGHT: Primary | ICD-10-CM

## 2019-06-04 PROCEDURE — 73560 PR  X-RAY KNEE 1 OR 2 VIEW: ICD-10-PCS | Mod: RT,,, | Performed by: ORTHOPAEDIC SURGERY

## 2019-06-04 PROCEDURE — 99024 PR POST-OP FOLLOW-UP VISIT: ICD-10-PCS | Mod: POP,,, | Performed by: ORTHOPAEDIC SURGERY

## 2019-06-04 PROCEDURE — 73560 X-RAY EXAM OF KNEE 1 OR 2: CPT | Mod: RT,,, | Performed by: ORTHOPAEDIC SURGERY

## 2019-06-04 PROCEDURE — 99024 POSTOP FOLLOW-UP VISIT: CPT | Mod: POP,,, | Performed by: ORTHOPAEDIC SURGERY

## 2019-06-04 NOTE — PROGRESS NOTES
[facility] ORTHOPEDICS POST-OP NOTE    Subjective:           Chief Complaint:   Chief Complaint   Patient presents with    Right Knee - Post-op Evaluation     Right TKA 5.1.19. States that her knee is still stiff. States that it is also sore.        Past Medical History:   Diagnosis Date    Diverticulitis     Encounter for blood transfusion     Hemorrhage     from rectum    Hepatitis A     High cholesterol     Hypertension        Past Surgical History:   Procedure Laterality Date    APPENDECTOMY      COLONOSCOPY      HYSTERECTOMY      JOINT REPLACEMENT      KNEE SURGERY  05/01/2019    Right TKA    TONSILLECTOMY         Current Outpatient Medications   Medication Sig    acetaminophen (TYLENOL EXTRA STRENGTH) 500 MG tablet Take 500 mg by mouth every 6 (six) hours as needed for Pain.    citalopram (CELEXA) 10 MG tablet Take 10 mg by mouth once daily.    co-enzyme Q-10 30 mg capsule Take 30 mg by mouth once daily.    fenofibrate (TRICOR) 54 MG tablet Take 54 mg by mouth once daily.    lisinopril (PRINIVIL,ZESTRIL) 5 MG tablet Take 5 mg by mouth every evening.     rosuvastatin (CRESTOR) 40 MG Tab Take 40 mg by mouth every evening.      No current facility-administered medications for this visit.        Review of patient's allergies indicates:   Allergen Reactions    Biaxin [clarithromycin]     Macrodantin [nitrofurantoin macrocrystalline]     Penicillins        History reviewed. No pertinent family history.    Social History     Socioeconomic History    Marital status:      Spouse name: Not on file    Number of children: Not on file    Years of education: Not on file    Highest education level: Not on file   Occupational History    Not on file   Social Needs    Financial resource strain: Not on file    Food insecurity:     Worry: Not on file     Inability: Not on file    Transportation needs:     Medical: Not on file     Non-medical: Not on file   Tobacco Use    Smoking status:  Former Smoker     Years: 5.00     Types: Cigarettes    Smokeless tobacco: Never Used   Substance and Sexual Activity    Alcohol use: No    Drug use: No    Sexual activity: Not on file   Lifestyle    Physical activity:     Days per week: Not on file     Minutes per session: Not on file    Stress: Not on file   Relationships    Social connections:     Talks on phone: Not on file     Gets together: Not on file     Attends Sikhism service: Not on file     Active member of club or organization: Not on file     Attends meetings of clubs or organizations: Not on file     Relationship status: Not on file   Other Topics Concern    Not on file   Social History Narrative    Not on file       History of present illness: Patient comes in today for the right knee. She is doing very well.      Review of Systems:    Musculoskeletal:  See HPI      Objective:        Physical Examination:    Vital Signs:    Vitals:    06/04/19 1134   BP: 103/65   Pulse: 100       Body mass index is 31.62 kg/m².    This a well-developed, well nourished patient in no acute distress.  They are alert and oriented and cooperative to examination.        Range of motion is 0-110 degrees. Her calf is soft. Homans sign is negative  Pertinent New Results:    XRAY Report / Interpretation:   AP and lateral of the right knee demonstrates her total knee arthroplasty be in ideal position    Assessment/Plan:      Stable following right total knee. Follow-up in 3 months    This note was created using Dragon voice recognition software that occasionally misinterpreted phrases or words.

## 2019-11-30 ENCOUNTER — CLINICAL SUPPORT (OUTPATIENT)
Dept: URGENT CARE | Facility: CLINIC | Age: 69
End: 2019-11-30
Payer: MEDICARE

## 2019-11-30 VITALS
TEMPERATURE: 97 F | SYSTOLIC BLOOD PRESSURE: 118 MMHG | BODY MASS INDEX: 32.95 KG/M2 | DIASTOLIC BLOOD PRESSURE: 87 MMHG | HEART RATE: 85 BPM | RESPIRATION RATE: 18 BRPM | HEIGHT: 64 IN | WEIGHT: 193 LBS | OXYGEN SATURATION: 98 %

## 2019-11-30 DIAGNOSIS — J01.90 ACUTE NON-RECURRENT SINUSITIS, UNSPECIFIED LOCATION: Primary | ICD-10-CM

## 2019-11-30 PROCEDURE — 99214 OFFICE O/P EST MOD 30 MIN: CPT | Mod: S$GLB,,, | Performed by: NURSE PRACTITIONER

## 2019-11-30 PROCEDURE — 99214 PR OFFICE/OUTPT VISIT, EST, LEVL IV, 30-39 MIN: ICD-10-PCS | Mod: S$GLB,,, | Performed by: NURSE PRACTITIONER

## 2019-11-30 RX ORDER — FLUTICASONE PROPIONATE 50 MCG
2 SPRAY, SUSPENSION (ML) NASAL DAILY
Qty: 15.8 ML | Refills: 0 | Status: SHIPPED | OUTPATIENT
Start: 2019-11-30 | End: 2020-07-12 | Stop reason: CLARIF

## 2019-11-30 RX ORDER — CETIRIZINE HYDROCHLORIDE 10 MG/1
10 TABLET ORAL DAILY
Qty: 30 TABLET | Refills: 0 | Status: SHIPPED | OUTPATIENT
Start: 2019-11-30 | End: 2019-12-30

## 2019-11-30 RX ORDER — DOXYCYCLINE 100 MG/1
100 CAPSULE ORAL EVERY 12 HOURS
Qty: 14 CAPSULE | Refills: 0 | Status: SHIPPED | OUTPATIENT
Start: 2019-11-30 | End: 2019-12-07

## 2019-11-30 NOTE — PATIENT INSTRUCTIONS
Acute Sinusitis    Acute sinusitis is irritation and swelling of the sinuses. It is usually caused by a viral infection after a common cold. Your doctor can help you find relief.  What is acute sinusitis?  Sinuses are air-filled spaces in the skull behind the face. They are kept moist and clean by a lining of mucosa. Things such as pollen, smoke, and chemical fumes can irritate the mucosa. It can then swell up. As a response to irritation, the mucosa makes more mucus and other fluids. Tiny hairlike cilia cover the mucosa. Cilia help carry mucus toward the opening of the sinus. Too much mucus may cause the cilia to stop working. This blocks the sinus opening. A buildup of fluid in the sinuses then causes pain and pressure. It can also encourage bacteria to grow in the sinuses.  Common symptoms of acute sinusitis  You may have:  · Facial soreness pain  · Headache  · Fever  · Fluid draining in the back of the throat (postnasal drip)  · Congestion  · Drainage that is thick and colored, instead of clear  · Cough  Diagnosing acute sinusitis  Your doctor will ask about your symptoms and health history. He or she will look at your ear, nose, and throat. You usually won't need to have X-rays taken.    The doctor may take a sample of mucus to check for bacteria. If you have sinusitis that keeps coming back, you may need imaging tests such as X-rays or CAT scans. This will help your doctor check for a structural problem that may be causing the infection.  Treating acute sinusitis  Treatment is aimed at unblocking the sinus opening and helping the cilia work again. You may need to take antihistamine and decongestant medicine. These can reduce inflammation and decrease the amount of fluid your sinuses make. If you have a bacterial infection, you will need to take antibiotic medicine for 10 to 14 days. Take this medicine until it is gone, even if you feel better.  Date Last Reviewed: 10/1/2016  © 1694-9524 [employer],  [employer]. [address] [place] [address]. All rights reserved. This information is not intended as a substitute for professional medical care. Always follow your healthcare professional's instructions.        Self-Care for Sinusitis     Drinking plenty of water can help sinuses drain.   Sinusitis can often be managed with self-care. Self-care can keep sinuses moist and make you feel more comfortable. Remember to follow your doctor's instructions closely. This can make a big difference in getting your sinus problem under control.  Drink fluids  Drinking extra fluids helps thin your mucus. This lets it drain from your sinuses more easily. Have a glass of water every hour or two. A humidifier helps in much the same way. Fluids can also offset the drying effects of certain medicines. If you use a humidifier, follow the product maker's instructions on how to use it. Clean it on a regular schedule.  Use saltwater rinses  Rinses help keep your sinuses and nose moist. Mix a teaspoon of salt in 8 ounces of fresh, warm water. Use a bulb syringe to gently squirt the water into your nose a few times a day. You can also buy ready-made saline nasal sprays.  Apply hot or cold packs  Applying heat to the area surrounding your sinuses may make you feel more comfortable. Use a hot water bottle or a hand towel dipped in hot water. Some people also find ice packs effective for relieving pain.  Medicines  Your doctor may prescribe medications to help treat your sinusitis. If you have an infection, antibiotics can help clear it up. If you are prescribed antibiotics, take all pills on schedule until they are gone, even if you feel better. Decongestants help relieve swelling. Use decongestant sprays for short periods only under the direction of your doctor. If you have allergies, your doctor may prescribe medications to help relieve them.   Date Last Reviewed: 10/1/2016  © 2931-4429 [employer]. [address] [place] [address],  [place] [address]. All rights reserved. This information is not intended as a substitute for professional medical care. Always follow your healthcare professional's instructions.

## 2019-11-30 NOTE — PROGRESS NOTES
"Subjective:       Patient ID: Shantal Orourke is a 68 y.o. female.    Vitals:  height is 5' 4" (1.626 m) and weight is 87.5 kg (193 lb). Her temperature is 97.4 °F (36.3 °C). Her blood pressure is 118/87 and her pulse is 85. Her respiration is 18 and oxygen saturation is 98%.     Chief Complaint: Sinus Problem    Patient complains of sinus congestion, post nasal drip, and cough for 4 days. Reports subjective fever yesterday. Denies sob, wheezing, chest pain, nausea, vomiting, diarrhea.     Sinus Problem   Episode onset: thursday  Associated symptoms include congestion, coughing and headaches. Pertinent negatives include no chills, shortness of breath or sore throat. (Sinus drip) Past treatments include nothing.       Constitution: Negative for chills, fatigue and fever.   HENT: Positive for congestion. Negative for sore throat.    Neck: Negative for painful lymph nodes.   Cardiovascular: Negative for chest pain and leg swelling.   Eyes: Negative for double vision and blurred vision.   Respiratory: Positive for cough. Negative for shortness of breath.    Gastrointestinal: Negative for abdominal pain, nausea, vomiting and diarrhea.   Genitourinary: Negative for dysuria, frequency, urgency and history of kidney stones.   Musculoskeletal: Negative for joint pain, joint swelling, muscle cramps and muscle ache.   Skin: Negative for color change, pale, rash and bruising.   Allergic/Immunologic: Negative for seasonal allergies.   Neurological: Positive for headaches. Negative for dizziness, history of vertigo, light-headedness and passing out.   Hematologic/Lymphatic: Negative for swollen lymph nodes.   Psychiatric/Behavioral: Negative for nervous/anxious, sleep disturbance and depression. The patient is not nervous/anxious.        Objective:      Physical Exam   Constitutional: She is oriented to person, place, and time. Vital signs are normal. She appears well-developed and well-nourished. She is cooperative.  Non-toxic " appearance. She does not have a sickly appearance. She does not appear ill. No distress.   HENT:   Head: Normocephalic and atraumatic.   Right Ear: Hearing, tympanic membrane, external ear and ear canal normal.   Left Ear: Hearing, tympanic membrane, external ear and ear canal normal.   Nose: Mucosal edema and rhinorrhea present. No nasal deformity. No epistaxis. Right sinus exhibits maxillary sinus tenderness and frontal sinus tenderness. Left sinus exhibits maxillary sinus tenderness and frontal sinus tenderness.   Mouth/Throat: Uvula is midline and mucous membranes are normal. No trismus in the jaw. Normal dentition. No uvula swelling. Posterior oropharyngeal erythema present.   Eyes: Conjunctivae and lids are normal. Right eye exhibits no discharge. Left eye exhibits no discharge. No scleral icterus.   Neck: Trachea normal, normal range of motion, full passive range of motion without pain and phonation normal. Neck supple.   Cardiovascular: Normal rate, regular rhythm, normal heart sounds, intact distal pulses and normal pulses.   Pulmonary/Chest: Effort normal and breath sounds normal. No respiratory distress.   Abdominal: Soft. Normal appearance and bowel sounds are normal. She exhibits no distension, no pulsatile midline mass and no mass. There is no tenderness.   Musculoskeletal: Normal range of motion. She exhibits no edema or deformity.   Neurological: She is alert and oriented to person, place, and time. She exhibits normal muscle tone. Coordination normal. GCS eye subscore is 4. GCS verbal subscore is 5. GCS motor subscore is 6.   Skin: Skin is warm, dry, intact, not diaphoretic and not pale.   Psychiatric: She has a normal mood and affect. Her speech is normal and behavior is normal. Judgment and thought content normal. Cognition and memory are normal.   Nursing note and vitals reviewed.        Assessment:       1. Acute non-recurrent sinusitis, unspecified location        Plan:       Due to patient  presentation as well as length of symptoms, will treat for bacterial sinusitis. Patient is being discharged home. Discussed reasons to return and importance of followup. All questions addressed and patient given discharge instructions and followup information.    Acute non-recurrent sinusitis, unspecified location    Other orders  -     cetirizine (ZYRTEC) 10 MG tablet; Take 1 tablet (10 mg total) by mouth once daily.  Dispense: 30 tablet; Refill: 0  -     fluticasone propionate (FLONASE) 50 mcg/actuation nasal spray; 2 sprays (100 mcg total) by Each Nostril route once daily.  Dispense: 15.8 mL; Refill: 0  -     dexchlorphen-phenylephrine-DM (POLYTUSSIN DM) 1-5-10 mg/5 mL Syrp; Take 5 mLs by mouth every 4 (four) hours as needed.  Dispense: 120 mL; Refill: 0  -     doxycycline (VIBRAMYCIN) 100 MG Cap; Take 1 capsule (100 mg total) by mouth every 12 (twelve) hours. for 7 days  Dispense: 14 capsule; Refill: 0

## 2020-01-29 ENCOUNTER — OFFICE VISIT (OUTPATIENT)
Dept: URGENT CARE | Facility: CLINIC | Age: 70
End: 2020-01-29
Payer: MEDICARE

## 2020-01-29 VITALS
BODY MASS INDEX: 32.27 KG/M2 | HEIGHT: 64 IN | TEMPERATURE: 99 F | HEART RATE: 88 BPM | WEIGHT: 189 LBS | RESPIRATION RATE: 16 BRPM | SYSTOLIC BLOOD PRESSURE: 106 MMHG | OXYGEN SATURATION: 99 % | DIASTOLIC BLOOD PRESSURE: 78 MMHG

## 2020-01-29 DIAGNOSIS — J40 BRONCHITIS: Primary | ICD-10-CM

## 2020-01-29 PROCEDURE — 96372 PR INJECTION,THERAP/PROPH/DIAG2ST, IM OR SUBCUT: ICD-10-PCS | Mod: 59,S$GLB,, | Performed by: EMERGENCY MEDICINE

## 2020-01-29 PROCEDURE — 94640 AIRWAY INHALATION TREATMENT: CPT | Mod: S$GLB,,, | Performed by: NURSE PRACTITIONER

## 2020-01-29 PROCEDURE — 96372 THER/PROPH/DIAG INJ SC/IM: CPT | Mod: 59,S$GLB,, | Performed by: EMERGENCY MEDICINE

## 2020-01-29 PROCEDURE — 99214 PR OFFICE/OUTPT VISIT, EST, LEVL IV, 30-39 MIN: ICD-10-PCS | Mod: 25,S$GLB,, | Performed by: NURSE PRACTITIONER

## 2020-01-29 PROCEDURE — 94640 PR INHAL RX, AIRWAY OBST/DX SPUTUM INDUCT: ICD-10-PCS | Mod: S$GLB,,, | Performed by: NURSE PRACTITIONER

## 2020-01-29 PROCEDURE — 99214 OFFICE O/P EST MOD 30 MIN: CPT | Mod: 25,S$GLB,, | Performed by: NURSE PRACTITIONER

## 2020-01-29 RX ORDER — ALBUTEROL SULFATE 0.83 MG/ML
2.5 SOLUTION RESPIRATORY (INHALATION)
Status: COMPLETED | OUTPATIENT
Start: 2020-01-29 | End: 2020-01-29

## 2020-01-29 RX ORDER — DEXAMETHASONE SODIUM PHOSPHATE 4 MG/ML
8 INJECTION, SOLUTION INTRA-ARTICULAR; INTRALESIONAL; INTRAMUSCULAR; INTRAVENOUS; SOFT TISSUE
Status: COMPLETED | OUTPATIENT
Start: 2020-01-29 | End: 2020-01-29

## 2020-01-29 RX ORDER — ALBUTEROL SULFATE 90 UG/1
2 AEROSOL, METERED RESPIRATORY (INHALATION) EVERY 6 HOURS PRN
Qty: 18 G | Refills: 0 | Status: SHIPPED | OUTPATIENT
Start: 2020-01-29 | End: 2021-01-26

## 2020-01-29 RX ORDER — FLUTICASONE PROPIONATE 50 MCG
2 SPRAY, SUSPENSION (ML) NASAL 2 TIMES DAILY
Qty: 15.8 ML | Refills: 0 | Status: SHIPPED | OUTPATIENT
Start: 2020-01-29 | End: 2021-04-14

## 2020-01-29 RX ORDER — BENZONATATE 100 MG/1
100 CAPSULE ORAL 3 TIMES DAILY PRN
Qty: 30 CAPSULE | Refills: 1 | Status: SHIPPED | OUTPATIENT
Start: 2020-01-29 | End: 2021-01-26

## 2020-01-29 RX ORDER — IPRATROPIUM BROMIDE 0.5 MG/2.5ML
0.5 SOLUTION RESPIRATORY (INHALATION)
Status: COMPLETED | OUTPATIENT
Start: 2020-01-29 | End: 2020-01-29

## 2020-01-29 RX ORDER — PREDNISONE 20 MG/1
20 TABLET ORAL 2 TIMES DAILY
Qty: 10 TABLET | Refills: 0 | Status: SHIPPED | OUTPATIENT
Start: 2020-01-29 | End: 2020-02-03

## 2020-01-29 RX ORDER — CETIRIZINE HYDROCHLORIDE 10 MG/1
10 TABLET ORAL DAILY
Qty: 30 TABLET | Refills: 2 | Status: SHIPPED | OUTPATIENT
Start: 2020-01-29 | End: 2021-06-28

## 2020-01-29 RX ADMIN — DEXAMETHASONE SODIUM PHOSPHATE 8 MG: 4 INJECTION, SOLUTION INTRA-ARTICULAR; INTRALESIONAL; INTRAMUSCULAR; INTRAVENOUS; SOFT TISSUE at 04:01

## 2020-01-29 RX ADMIN — ALBUTEROL SULFATE 2.5 MG: 0.83 SOLUTION RESPIRATORY (INHALATION) at 03:01

## 2020-01-29 RX ADMIN — IPRATROPIUM BROMIDE 0.5 MG: 0.5 SOLUTION RESPIRATORY (INHALATION) at 03:01

## 2020-01-29 NOTE — PATIENT INSTRUCTIONS
Symptomatic treatment:    Alternate Tylenol and Ibuprofen every 3 hrs for fever, pain and inflammation. Avoid Nsaids if you are pregnant or have advanced kidney disease.     salt water gargles to soothe throat from post nasal drip  Honey/lemon water or warm tea to soothe throat from post nasal drip  Cepachol helps to soothe the discomfort in throat from post nasal drip    Cold-eeze helps to reduce the duration of URI symptoms if taken early  Elderberry to reduce duration of viral URI symptoms    Nasal saline spray reduces inflammation and dryness  Warm face compresses/hot showers as often as you can to open sinuses and allow to drain.   Flonase OTC or Nasacort OTC to help decrease inflammation in nasal turbinates and allow sinuses to drain    Vicks vapor rub at night  Simple foods like chicken noodle soup help provide hydration and nutrition    Delsym helps with coughing at night    Zantac will help if there is reflux from the post nasal drip and helpful to take at night    Zyrtec/Claritin during the day and Benadryl at night may help if allergy component concurrently with URI    Rest as much as you can    Your symptoms will likely last 5-7 days, maybe longer depending on how it affects your body.  You are contagious 5-7, so minimize contact with others to reduce the spread to others and stay home from work or school as we discussed. Dehydration is preventable but is one of the main reasons why you will feel so badly. Drink pedialyte, gatorade or propel. Stay hydrated.  Antibiotics are not needed unless a complication(such as Otitis Media, Bacterial sinus infection or pneumonia) develops. Taking antibiotics for Flu/Cold is not supported by evidence-based medicine and can expose you to unnecessary side effects of the medication, such as anaphylaxis, yeast infection and leads to antibiotic resistance.   If you experience any:  Chest pain, shortness of breath, wheezing or difficulty breathing,  Severe headache, face,  neck or ear pain,  New rash,  Fever over 101.5º F (38.6 C) for more than three days,  Confusion, behavior change or seizure,  Severe weakness or dizziness, please go to the ER immediately for further testing.             POST NASAL DRIP  Postnasal drip is a condition that causes a large amount of mucus to collect in your throat or nose. It may also be called upper airway cough syndrome because the mucus causes repeated coughing. You may have a sore throat, or throat tissues may swell. This may feel like a lump in your throat. You may also feel like you need to clear your throat often.    Manage postnasal drip:  Use a humidifier or vaporizer. Use a cool mist humidifier or a vaporizer to increase air moisture in your home. This may make it easier for you to breathe.  Drink more liquids as directed. Liquids help keep your air passages moist and help you cough up mucus. Ask how much liquid to drink each day and which liquids are best for you.  Sleep on propped up pillows, to keep the mucus from collecting at the back of your throat  Nasal irrigation (available over-the-counter)  Avoid cold air and dry, heated air. Cold or dry air can trigger postnasal drip. Try to stay inside on cold days, or keep your mouth covered. Do not stay long in areas that have dry, heated air.  Do not smoke, and avoid secondhand smoke. Nicotine and other chemicals in cigarettes and cigars can irritate your throat and make coughing worse. Ask your healthcare provider for information if you currently smoke and need help to quit. E-cigarettes or smokeless tobacco still contain nicotine. Talk to your healthcare provider before you use these products.    Medications  Medicines may be given to thin the mucus. You may need to swallow the medicine or use a device to flush your sinuses with liquid squirted into your nose. Nasal sprays may also be needed to keep the tissues in your nose moist. Medicines can also relieve congestion. Allergy medicine may  "help if your symptoms are caused by seasonal allergies, such as hay fever. You may need medicine to help control GERD.  Take your medicine as directed. Contact your healthcare provider if you think your medicine is not helping or if you have side effects. Tell him or her if you are allergic to any medicine. Keep a list of the medicines, vitamins, and herbs you take. Include the amounts, and when and why you take them. Bring the list or the pill bottles to follow-up visits. Carry your medicine list with you in case of an emergency.  A oral decongestant, such as pseudoephedrine (as in Sudafed) or phenylephrine (as in Sudafed PE or Kev-Synephrine)  Guaifenesin (as in Mucinex), a medication that can thin the mucus  An anti-histamine, such as  diphenhydramine, as in Benadryl, chlorpheniramine, as in Chlor-Trimeton, loratadine, as in Claritin or Alavert, fexofenadine (Allegra), cetirizine (Zyrtec), levocetirizine (Xyzal), desloratadine (Clarinex)  A nasal decongestant such as oxymetazoline (contained in Afrin) which constricts blood vessels in the nasal passages; this leads to less secretions. Such medications should only be taken for a day or two; longer-term use can cause more harm than good)  Keep in mind that many of these medications are combined in over-the-counter products. For example, there are several formulations of "Sudafed" containing pseudoephedrine or phenylephrine along with additional drugs including acetaminophen, dextromethorphan, and guaifenesin. While these combinations can be effective, it's important to read the label and avoid taking too much of any active ingredient.  What about prescription treatments?  If these approaches aren't effective, prescription treatments may be the next best steps, including:  A nasal steroid spray (such as beclomethasone/Beconase or triamcinolone/Nasacort)  Ipratropium (Atrovent) nasal spray which inhibits secretions (such as mucus)  Other treatments depend on the " cause of the post-nasal drip. Antibiotics are not usually helpful so they aren't usually prescribed for post-nasal drip (unless the symptoms are due to bacterial infection of the sinuses). For allergies, dusting and vacuuming often, covering your mattresses and pillowcases, and special air filter can help reduce exposure to allergy triggers.    What about chicken soup?  If you've been told that chicken soup helps with post-nasal drip (or other symptoms of a cold or flu), it's true! But it doesn't actually have to be chicken soup - any hot liquid can help thin the mucus and help you maintain hydration.    When should I call a doctor?  In most cases, post-nasal drip is annoying but not dangerous. However, you should contact your doctor if you have:  Unexplained fever  Bloody mucus  Wheezing or shortness of breath  Foul smelling drainage  Persistent symptoms despite treatment  The bad news/good news about post nasal drip  Post-nasal drip is among the most common causes of persistent cough, hoarseness, sore throat and other annoying symptoms. It can be caused by a number of conditions and may linger for weeks or months. That's the bad news. The good news is that most of the causes can be quickly identified and most will improve with treatment.

## 2020-01-29 NOTE — PROGRESS NOTES
"Subjective:       Patient ID: Shantal Orourke is a 69 y.o. female.    Vitals:  height is 5' 4" (1.626 m) and weight is 85.7 kg (189 lb). Her oral temperature is 98.5 °F (36.9 °C). Her blood pressure is 106/78 and her pulse is 88. Her respiration is 16 and oxygen saturation is 99%.     Chief Complaint: Cough    Cough   This is a new problem. The current episode started in the past 7 days. The problem has been gradually worsening. Associated symptoms include postnasal drip, rhinorrhea, a sore throat, shortness of breath and wheezing. Pertinent negatives include no chest pain, chills, fever, headaches, myalgias or rash. Associated symptoms comments: Chest congestion, sneezing. She has tried OTC cough suppressant (zyrtec) for the symptoms. The treatment provided mild relief.       Constitution: Positive for fatigue. Negative for chills and fever.   HENT: Positive for congestion, postnasal drip, sinus pain, sinus pressure and sore throat.    Neck: Negative for painful lymph nodes.   Cardiovascular: Negative for chest pain and leg swelling.   Eyes: Negative for double vision and blurred vision.   Respiratory: Positive for cough, shortness of breath and wheezing.    Gastrointestinal: Negative for nausea, vomiting and diarrhea.   Genitourinary: Negative for dysuria, frequency, urgency and history of kidney stones.   Musculoskeletal: Negative for joint pain, joint swelling, muscle cramps and muscle ache.   Skin: Negative for color change, pale, rash and bruising.   Allergic/Immunologic: Negative for seasonal allergies.   Neurological: Negative for dizziness, history of vertigo, light-headedness, passing out and headaches.   Hematologic/Lymphatic: Negative for swollen lymph nodes.   Psychiatric/Behavioral: Negative for nervous/anxious, sleep disturbance and depression. The patient is not nervous/anxious.        Objective:      Physical Exam   Constitutional: She is oriented to person, place, and time. She appears well-developed " and well-nourished. She is cooperative.  Non-toxic appearance. She does not appear ill. No distress.   HENT:   Head: Normocephalic and atraumatic.   Right Ear: Hearing, external ear and ear canal normal. A middle ear effusion is present.   Left Ear: Hearing, external ear and ear canal normal. A middle ear effusion is present.   Nose: Rhinorrhea present. No mucosal edema or nasal deformity. No epistaxis. Right sinus exhibits maxillary sinus tenderness. Right sinus exhibits no frontal sinus tenderness. Left sinus exhibits maxillary sinus tenderness. Left sinus exhibits no frontal sinus tenderness.   Mouth/Throat: Uvula is midline and mucous membranes are normal. No trismus in the jaw. Normal dentition. No uvula swelling. Posterior oropharyngeal erythema and cobblestoning present.   Eyes: Conjunctivae and lids are normal. Right eye exhibits no discharge. Left eye exhibits no discharge. No scleral icterus.   Neck: Trachea normal, normal range of motion, full passive range of motion without pain and phonation normal. Neck supple.   Cardiovascular: Normal rate, regular rhythm, normal heart sounds, intact distal pulses and normal pulses.   Pulmonary/Chest: Effort normal. No respiratory distress. She has wheezes in the right upper field, the right middle field, the right lower field, the left upper field, the left middle field and the left lower field.   Abdominal: Soft. Normal appearance and bowel sounds are normal. She exhibits no distension, no pulsatile midline mass and no mass. There is no tenderness.   Musculoskeletal: Normal range of motion. She exhibits no edema or deformity.   Neurological: She is alert and oriented to person, place, and time. She exhibits normal muscle tone. Coordination normal.   Skin: Skin is warm, dry, intact, not diaphoretic and not pale.   Psychiatric: She has a normal mood and affect. Her speech is normal and behavior is normal. Judgment and thought content normal. Cognition and memory are  normal.   Nursing note and vitals reviewed.        Assessment:       1. Bronchitis        Plan:         Bronchitis    Other orders  -     albuterol nebulizer solution 2.5 mg  -     dexamethasone injection 8 mg  -     ipratropium 0.02 % nebulizer solution 0.5 mg  -     albuterol (VENTOLIN HFA) 90 mcg/actuation inhaler; Inhale 2 puffs into the lungs every 6 (six) hours as needed for Wheezing. Rescue  Dispense: 18 g; Refill: 0  -     dexchlorphen-phenylephrine-DM 1-5-10 mg/5 mL Syrp; Take 5 mLs by mouth every 4 (four) hours as needed.  Dispense: 480 mL; Refill: 0  -     predniSONE (DELTASONE) 20 MG tablet; Take 1 tablet (20 mg total) by mouth 2 (two) times daily. for 5 days  Dispense: 10 tablet; Refill: 0  -     cetirizine (ZYRTEC) 10 MG tablet; Take 1 tablet (10 mg total) by mouth once daily.  Dispense: 30 tablet; Refill: 2  -     fluticasone propionate (FLONASE) 50 mcg/actuation nasal spray; 2 sprays (100 mcg total) by Each Nostril route 2 (two) times daily.  Dispense: 15.8 mL; Refill: 0  -     benzonatate (TESSALON PERLES) 100 MG capsule; Take 1 capsule (100 mg total) by mouth 3 (three) times daily as needed for Cough.  Dispense: 30 capsule; Refill: 1

## 2020-01-30 ENCOUNTER — OFFICE VISIT (OUTPATIENT)
Dept: ORTHOPEDICS | Facility: CLINIC | Age: 70
End: 2020-01-30
Payer: MEDICARE

## 2020-01-30 VITALS
WEIGHT: 189 LBS | SYSTOLIC BLOOD PRESSURE: 105 MMHG | BODY MASS INDEX: 32.27 KG/M2 | HEART RATE: 94 BPM | DIASTOLIC BLOOD PRESSURE: 75 MMHG | HEIGHT: 64 IN

## 2020-01-30 DIAGNOSIS — M70.61 GREATER TROCHANTERIC BURSITIS, RIGHT: Primary | ICD-10-CM

## 2020-01-30 PROCEDURE — 20610 LARGE JOINT ASPIRATION/INJECTION: R GREATER TROCHANTERIC BURSA: ICD-10-PCS | Mod: RT,S$GLB,, | Performed by: ORTHOPAEDIC SURGERY

## 2020-01-30 PROCEDURE — 99213 OFFICE O/P EST LOW 20 MIN: CPT | Mod: 25,S$GLB,, | Performed by: ORTHOPAEDIC SURGERY

## 2020-01-30 PROCEDURE — 20610 DRAIN/INJ JOINT/BURSA W/O US: CPT | Mod: RT,S$GLB,, | Performed by: ORTHOPAEDIC SURGERY

## 2020-01-30 PROCEDURE — 99213 PR OFFICE/OUTPT VISIT, EST, LEVL III, 20-29 MIN: ICD-10-PCS | Mod: 25,S$GLB,, | Performed by: ORTHOPAEDIC SURGERY

## 2020-01-30 RX ORDER — METHYLPREDNISOLONE ACETATE 40 MG/ML
40 INJECTION, SUSPENSION INTRA-ARTICULAR; INTRALESIONAL; INTRAMUSCULAR; SOFT TISSUE
Status: DISCONTINUED | OUTPATIENT
Start: 2020-01-30 | End: 2020-01-30 | Stop reason: HOSPADM

## 2020-01-30 RX ADMIN — METHYLPREDNISOLONE ACETATE 40 MG: 40 INJECTION, SUSPENSION INTRA-ARTICULAR; INTRALESIONAL; INTRAMUSCULAR; SOFT TISSUE at 10:01

## 2020-01-30 NOTE — PROCEDURES
Large Joint Aspiration/Injection: R greater trochanteric bursa  Date/Time: 1/30/2020 10:45 AM  Performed by: Robby Babb MD  Authorized by: Robby Babb MD     Consent Done?:  Yes (Verbal)  Indications:  Pain  Procedure site marked: Yes    Timeout: Prior to procedure the correct patient, procedure, and site was verified    Anesthesia  Local anesthesia used  Anesthetic: lidocaine 1% without epinephrine    Location:  Hip  Site:  R greater trochanteric bursa  Prep: Patient was prepped and draped in usual sterile fashion    Needle size:  25 G  Medications:  40 mg methylPREDNISolone acetate 40 mg/mL; 40 mg methylPREDNISolone acetate 40 mg/mL  Patient tolerance:  Patient tolerated the procedure well with no immediate complications

## 2020-01-30 NOTE — PROGRESS NOTES
PLEASE READ YOUR DISCHARGE INSTRUCTIONS ENTIRELY AS IT CONTAINS IMPORTANT INFORMATION.  Please follow up with your urologist.  We will call in 3-5 days with results of the urine culture.   - Rest.    - Drink plenty of fluids.    - Tylenol or Ibuprofen as directed as needed for fever/pain.    - Follow up with your PCP or specialty clinic as directed in the next 1-2 weeks if not improved or as needed.  You can call [phone] to schedule an appointment with the appropriate provider.    - If you were prescribed antibiotics, please take them to completion.  - If you were prescribed a narcotic medication, do not drive or operate heavy equipment or machinery while taking these medications.  - If you  smoke, please stop smoking.  -You must understand that you've received an Urgent Care treatment only and that you may be released before all your medical problems are known or treated. You, the patient, will    arrange for follow up care as instructed.  - Please return to Urgent Care or to the Emergency Department if your symptoms worsen.    Patient aware and verbalized understanding.    When Your Child Has a Urinary Tract Infection (UTI)   A urinary tract infection (UTI) is a bacterial infection in the urinary tract. The urinary tract is made up of the kidneys, ureters, bladder, and urethra. Children often get UTIs that affect the bladder. UTIs can be uncomfortable and painful. But with treatment, most children recover with no lasting effects.  What is the urinary tract?  The following body parts make up the urinary tract:     A urinary tract infection is caused by bacteria that enter the urinary tract.    · Kidneys filter waste from the blood and make urine.  · Ureters carry urine from the kidneys to the bladder.  · The bladder stores urine.  · The urethra carries urine from the bladder to the outside of the body.  What causes a urinary tract infection?  Most UTIs are caused by bacteria that enter the urinary tract  [facility]    Subjective:     Chief Complaint:   Chief Complaint   Patient presents with    Right Hip - Pain     Right hip pain x a while, States that she had an injection in the hip 4.2.19. States that the injection did help and states that she would to see if she can get another injection today if possible.        Past Medical History:   Diagnosis Date    Diverticulitis     Encounter for blood transfusion     Hemorrhage     from rectum    Hepatitis A     High cholesterol     Hypertension        Past Surgical History:   Procedure Laterality Date    APPENDECTOMY      COLONOSCOPY      HYSTERECTOMY      JOINT REPLACEMENT      KNEE SURGERY  05/01/2019    Right TKA    TONSILLECTOMY         Current Outpatient Medications   Medication Sig    acetaminophen (TYLENOL EXTRA STRENGTH) 500 MG tablet Take 500 mg by mouth every 6 (six) hours as needed for Pain.    albuterol (VENTOLIN HFA) 90 mcg/actuation inhaler Inhale 2 puffs into the lungs every 6 (six) hours as needed for Wheezing. Rescue    benzonatate (TESSALON PERLES) 100 MG capsule Take 1 capsule (100 mg total) by mouth 3 (three) times daily as needed for Cough.    celecoxib (CELEBREX ORAL) Take by mouth.    cetirizine (ZYRTEC) 10 MG tablet Take 1 tablet (10 mg total) by mouth once daily.    co-enzyme Q-10 30 mg capsule Take 30 mg by mouth once daily.    dexchlorphen-phenylephrine-DM 1-5-10 mg/5 mL Syrp Take 5 mLs by mouth every 4 (four) hours as needed.    fenofibrate (TRICOR) 54 MG tablet Take 54 mg by mouth once daily.    fluticasone propionate (FLONASE) 50 mcg/actuation nasal spray 2 sprays (100 mcg total) by Each Nostril route once daily.    fluticasone propionate (FLONASE) 50 mcg/actuation nasal spray 2 sprays (100 mcg total) by Each Nostril route 2 (two) times daily.    lisinopril (PRINIVIL,ZESTRIL) 5 MG tablet Take 5 mg by mouth every evening.     predniSONE (DELTASONE) 20 MG tablet Take 1 tablet (20 mg total) by mouth 2 (two)  through the urethra. The urinary tracts of boys and girls are slightly different. The urethra is shorter in girls. This makes it easier for bacteria to enter. As a result, girls are more likely than boys to get UTIs.  What are the symptoms of a urinary tract infection?  · If your child has a UTI affecting the bladder (cystitis), symptoms can include:  ¨ Painful urination  ¨ Frequent urination  ¨ Urgent need to urinate  ¨ Blood in the urine  ¨ Daytime wetting or nighttime bedwetting when previously continent  · If your child has a UTI affecting the kidneys (pyelonephritis), symptoms are similar to those of a bladder infection. They can also include:  ¨ Fever  ¨ Abdominal pain  ¨ Nausea and vomiting  ¨ Cloudy urine  ¨ Foul-smelling urine  How is a urinary tract infection diagnosed?  · The doctor asks about your childs symptoms and health history. Your child is examined.  · A lab test, such as a urinalysis, is done. For this test, a urine sample is needed to check for bacteria and other signs of infection. The urine is also sent for a culture, a test that identifies what bacteria is growing in the urine. It can take 1 to 3 days to get results of a urine culture. If a UTI is suspected, the doctor will likely start treatment even before lab results come back.  · If your child has severe symptoms, other tests may be done. Youll be told more about this, if needed.  How is a urinary tract infection treated?  · Symptoms of a UTI generally go away within 24 to 72 hours of starting treatment.  · The doctor will prescribe antibiotics for your child. Make sure your child takes ALL of the medication even if he or she starts feeling better.   · You can do the following at home to relieve your childs symptoms:  ¨ Give your child over-the-counter (OTC) medications, such as ibuprofen or acetaminophen, to manage pain and fever. Do not give ibuprofen to an infant who is less than 6 months of age, or to a child who is dehydrated or  times daily. for 5 days    rosuvastatin (CRESTOR) 40 MG Tab Take 40 mg by mouth every evening.     dexchlorphen-phenylephrine-DM (POLYTUSSIN DM) 1-5-10 mg/5 mL Syrp Take 5 mLs by mouth every 4 (four) hours as needed. (Patient not taking: Reported on 1/30/2020)     No current facility-administered medications for this visit.        Review of patient's allergies indicates:   Allergen Reactions    Biaxin [clarithromycin]     Macrodantin [nitrofurantoin macrocrystalline]     Penicillins        History reviewed. No pertinent family history.    Social History     Socioeconomic History    Marital status:      Spouse name: Not on file    Number of children: Not on file    Years of education: Not on file    Highest education level: Not on file   Occupational History    Not on file   Social Needs    Financial resource strain: Not on file    Food insecurity:     Worry: Not on file     Inability: Not on file    Transportation needs:     Medical: Not on file     Non-medical: Not on file   Tobacco Use    Smoking status: Former Smoker     Years: 5.00     Types: Cigarettes    Smokeless tobacco: Never Used   Substance and Sexual Activity    Alcohol use: No    Drug use: No    Sexual activity: Not on file   Lifestyle    Physical activity:     Days per week: Not on file     Minutes per session: Not on file    Stress: Not on file   Relationships    Social connections:     Talks on phone: Not on file     Gets together: Not on file     Attends Gnosticist service: Not on file     Active member of club or organization: Not on file     Attends meetings of clubs or organizations: Not on file     Relationship status: Not on file   Other Topics Concern    Not on file   Social History Narrative    Not on file       History of present illness:  Patient comes in today for the right hip.  She is having pain over the trochanteric region.  She has had this in the past and has done well with Depo-Medrol injection.  The  constantly vomiting. Do not give aspirin to a child with a fever. This can put your child at risk of a serious illness called Reyes syndrome.  ¨ Ask your doctor about other medications that can be prescribed to relieve painful urination.  ¨ Give your child plenty of fluids to drink. Cranberry juice may help relieve some pain symptoms.  When you should call your healthcare provider  Call the doctor if your child has any of the following:  · Symptoms that do not improve within 48 hours of starting treatment  · Fever (see Fever and children, below)  · A fever that goes away but returns after starting treatment  · Increased abdominal or back pain  · Signs of dehydration (very dark or little urine, excessive thirst, dry mouth, dizziness)  · Vomiting or inability to tolerate prescribed antibiotics  · Child begins acting sicker  · If a urine culture was done, make sure to get the results from the healthcare provider. Make an appointment to follow up about a week after your child has finished antibiotics.  Fever and children  Always use a digital thermometer to check your childs temperature. Never use a mercury thermometer.  For infants and toddlers, be sure to use a rectal thermometer correctly. A rectal thermometer may accidentally poke a hole in (perforate) the rectum. It may also pass on germs from the stool. Always follow the product makers directions for proper use. If you dont feel comfortable taking a rectal temperature, use another method. When you talk to your childs healthcare provider, tell him or her which method you used to take your childs temperature.  Here are guidelines for fever temperature. Ear temperatures arent accurate before 6 months of age. Dont take an oral temperature until your child is at least 4 years old.  Infant under 3 months old:  · Ask your childs healthcare provider how you should take the temperature.  · Rectal or forehead (temporal artery) temperature of 100.4°F (38°C) or  pain has returned.      Review of Systems:    Constitution: Negative for chills, fever, and sweats.  Negative for unexplained weight loss.    HENT:  Negative for headaches and blurry vision.    Cardiovascular:Negative for chest pain or irregular heart beat. Negative for hypertension.    Respiratory:  Negative for cough and shortness of breath.    Gastrointestinal: Negative for abdominal pain, heartburn, melena, nausea, and vomitting.    Genitourinary:  Negative bladder incontinence and dysuria.    Musculoskeletal:  See HPI for details.     Neurological: Negative for numbness.    Psychiatric/Behavioral: Negative for depression.  The patient is not nervous/anxious.      Endocrine: Negative for polyuria    Hematologic/Lymphatic: Negative for bleeding problem.  Does not bruise/bleed easily.    Skin: Negative for poor would healing and rash    Objective:      Physical Examination:    Vital Signs:    Vitals:    01/30/20 1047   BP: 105/75   Pulse: 94       Body mass index is 32.44 kg/m².    This a well-developed, well nourished patient in no acute distress.  They are alert and oriented and cooperative to examination.        Patient has full range of motion of the right hip.  Full range of motion of the left hip.  She has negative straight leg raises.  She is extraordinarily tender over the right trochanteric bursa  Pertinent New Results:    XRAY Report / Interpretation:   AP pelvis and lateral of the right hip demonstrates normal bony anatomy no fracture subluxations or dislocations    Assessment/Plan:      Trochanteric bursitis right hip.  I injected the right trochanteric bursa with Depo-Medrol and lidocaine.  Follow up in 1 month      This note was created using Dragon voice recognition software that occasionally misinterpreted phrases or words.         higher, or as directed by the provider  · Armpit temperature of 99°F (37.2°C) or higher, or as directed by the provider  Child age 3 to 36 months:  · Rectal, forehead (temporal artery), or ear temperature of 102°F (38.9°C) or higher, or as directed by the provider  · Armpit temperature of 101°F (38.3°C) or higher, or as directed by the provider  Child of any age:  · Repeated temperature of 104°F (40°C) or higher, or as directed by the provider  · Fever that lasts more than 24 hours in a child under 2 years old. Or a fever that lasts for 3 days in a child 2 years or older.      How is a urinary tract infection prevented?  · Encourage your child to drink plenty of fluids.  · Encourage your child to empty the bladder all the way when urinating.  · Teach girls to wipe from the front to back when using the bathroom.  · Don't use bubble bath.  · Don't allow your child to become constipated.  · If your child has a UTI, he or she may need ultrasound imaging of the kidneys and bladder. This helps the doctor rule out possible anatomical problems that could cause a UTI. If problems are found, or if your child has recurrent UTIs, additional imaging tests may be helpful.  Date Last Reviewed: 1/1/2017  © 9054-8373 [employer] [address] [place] [address], [place] [address]. All rights reserved. This information is not intended as a substitute for professional medical care. Always follow your healthcare professional's instructions.

## 2020-07-12 ENCOUNTER — HOSPITAL ENCOUNTER (EMERGENCY)
Facility: HOSPITAL | Age: 70
Discharge: HOME | End: 2020-07-12
Attending: EMERGENCY MEDICINE
Payer: MEDICARE

## 2020-07-12 VITALS
SYSTOLIC BLOOD PRESSURE: 111 MMHG | TEMPERATURE: 98 F | RESPIRATION RATE: 16 BRPM | DIASTOLIC BLOOD PRESSURE: 71 MMHG | HEIGHT: 64 IN | WEIGHT: 189 LBS | OXYGEN SATURATION: 97 % | HEART RATE: 76 BPM | BODY MASS INDEX: 32.27 KG/M2

## 2020-07-12 DIAGNOSIS — K57.92 ACUTE DIVERTICULITIS: ICD-10-CM

## 2020-07-12 DIAGNOSIS — R10.9 ABDOMINAL PAIN: ICD-10-CM

## 2020-07-12 DIAGNOSIS — R10.9 ABDOMINAL PAIN, UNSPECIFIED ABDOMINAL LOCATION: Primary | ICD-10-CM

## 2020-07-12 LAB
ALBUMIN SERPL BCP-MCNC: 4 G/DL (ref 3.5–5.2)
ALP SERPL-CCNC: 86 U/L (ref 55–135)
ALT SERPL W/O P-5'-P-CCNC: 16 U/L (ref 10–44)
ANION GAP SERPL CALC-SCNC: 9 MMOL/L (ref 8–16)
AST SERPL-CCNC: 17 U/L (ref 10–40)
BACTERIA #/AREA URNS HPF: NEGATIVE /HPF
BASOPHILS # BLD AUTO: 0.04 K/UL (ref 0–0.2)
BASOPHILS NFR BLD: 0.3 % (ref 0–1.9)
BILIRUB SERPL-MCNC: 1.4 MG/DL (ref 0.1–1)
BILIRUB UR QL STRIP: NEGATIVE
BUN SERPL-MCNC: 9 MG/DL (ref 8–23)
CALCIUM SERPL-MCNC: 9.2 MG/DL (ref 8.7–10.5)
CHLORIDE SERPL-SCNC: 106 MMOL/L (ref 95–110)
CLARITY UR: CLEAR
CO2 SERPL-SCNC: 25 MMOL/L (ref 23–29)
COLOR UR: YELLOW
CREAT SERPL-MCNC: 0.8 MG/DL (ref 0.5–1.4)
DIFFERENTIAL METHOD: ABNORMAL
EOSINOPHIL # BLD AUTO: 0.1 K/UL (ref 0–0.5)
EOSINOPHIL NFR BLD: 0.8 % (ref 0–8)
ERYTHROCYTE [DISTWIDTH] IN BLOOD BY AUTOMATED COUNT: 13.2 % (ref 11.5–14.5)
EST. GFR  (AFRICAN AMERICAN): >60 ML/MIN/1.73 M^2
EST. GFR  (NON AFRICAN AMERICAN): >60 ML/MIN/1.73 M^2
GLUCOSE SERPL-MCNC: 109 MG/DL (ref 70–110)
GLUCOSE UR QL STRIP: NEGATIVE
HCT VFR BLD AUTO: 38.6 % (ref 37–48.5)
HGB BLD-MCNC: 12.4 G/DL (ref 12–16)
HGB UR QL STRIP: ABNORMAL
HYALINE CASTS #/AREA URNS LPF: 18 /LPF
IMM GRANULOCYTES # BLD AUTO: 0.04 K/UL (ref 0–0.04)
IMM GRANULOCYTES NFR BLD AUTO: 0.3 % (ref 0–0.5)
KETONES UR QL STRIP: NEGATIVE
LACTATE SERPL-SCNC: 0.9 MMOL/L (ref 0.5–1.9)
LEUKOCYTE ESTERASE UR QL STRIP: ABNORMAL
LIPASE SERPL-CCNC: 25 U/L (ref 4–60)
LYMPHOCYTES # BLD AUTO: 3.2 K/UL (ref 1–4.8)
LYMPHOCYTES NFR BLD: 24.6 % (ref 18–48)
MCH RBC QN AUTO: 29.2 PG (ref 27–31)
MCHC RBC AUTO-ENTMCNC: 32.1 G/DL (ref 32–36)
MCV RBC AUTO: 91 FL (ref 82–98)
MICROSCOPIC COMMENT: ABNORMAL
MONOCYTES # BLD AUTO: 1.3 K/UL (ref 0.3–1)
MONOCYTES NFR BLD: 9.7 % (ref 4–15)
NEUTROPHILS # BLD AUTO: 8.4 K/UL (ref 1.8–7.7)
NEUTROPHILS NFR BLD: 64.3 % (ref 38–73)
NITRITE UR QL STRIP: NEGATIVE
NRBC BLD-RTO: 0 /100 WBC
PH UR STRIP: 6 [PH] (ref 5–8)
PLATELET # BLD AUTO: 223 K/UL (ref 150–350)
PMV BLD AUTO: 10.7 FL (ref 9.2–12.9)
POTASSIUM SERPL-SCNC: 3.5 MMOL/L (ref 3.5–5.1)
PROT SERPL-MCNC: 7.7 G/DL (ref 6–8.4)
PROT UR QL STRIP: ABNORMAL
RBC # BLD AUTO: 4.24 M/UL (ref 4–5.4)
RBC #/AREA URNS HPF: 1 /HPF (ref 0–4)
SODIUM SERPL-SCNC: 140 MMOL/L (ref 136–145)
SP GR UR STRIP: 1.02 (ref 1–1.03)
SQUAMOUS #/AREA URNS HPF: 5 /HPF
URN SPEC COLLECT METH UR: ABNORMAL
UROBILINOGEN UR STRIP-ACNC: ABNORMAL EU/DL
WBC # BLD AUTO: 13.06 K/UL (ref 3.9–12.7)
WBC #/AREA URNS HPF: 7 /HPF (ref 0–5)

## 2020-07-12 PROCEDURE — 36415 COLL VENOUS BLD VENIPUNCTURE: CPT

## 2020-07-12 PROCEDURE — 93005 ELECTROCARDIOGRAM TRACING: CPT | Performed by: INTERNAL MEDICINE

## 2020-07-12 PROCEDURE — 25000003 PHARM REV CODE 250: Performed by: EMERGENCY MEDICINE

## 2020-07-12 PROCEDURE — 25500020 PHARM REV CODE 255: Performed by: EMERGENCY MEDICINE

## 2020-07-12 PROCEDURE — 99285 EMERGENCY DEPT VISIT HI MDM: CPT | Mod: 25

## 2020-07-12 PROCEDURE — 81001 URINALYSIS AUTO W/SCOPE: CPT

## 2020-07-12 PROCEDURE — 83605 ASSAY OF LACTIC ACID: CPT

## 2020-07-12 PROCEDURE — 80053 COMPREHEN METABOLIC PANEL: CPT

## 2020-07-12 PROCEDURE — 83690 ASSAY OF LIPASE: CPT

## 2020-07-12 PROCEDURE — 85025 COMPLETE CBC W/AUTO DIFF WBC: CPT

## 2020-07-12 RX ORDER — CITALOPRAM 10 MG/1
10 TABLET ORAL DAILY
COMMUNITY
End: 2021-01-26

## 2020-07-12 RX ORDER — HYDROCODONE BITARTRATE AND ACETAMINOPHEN 5; 325 MG/1; MG/1
1 TABLET ORAL EVERY 6 HOURS PRN
Qty: 18 TABLET | Refills: 0 | Status: SHIPPED | OUTPATIENT
Start: 2020-07-12 | End: 2021-01-26 | Stop reason: SDUPTHER

## 2020-07-12 RX ORDER — ACETAMINOPHEN 325 MG/1
650 TABLET ORAL
Status: COMPLETED | OUTPATIENT
Start: 2020-07-12 | End: 2020-07-12

## 2020-07-12 RX ORDER — ONDANSETRON 4 MG/1
4 TABLET, FILM COATED ORAL EVERY 6 HOURS
Qty: 12 TABLET | Refills: 0 | Status: SHIPPED | OUTPATIENT
Start: 2020-07-12 | End: 2021-04-14

## 2020-07-12 RX ORDER — CIPROFLOXACIN 500 MG/1
500 TABLET ORAL 2 TIMES DAILY
Qty: 14 TABLET | Refills: 0 | Status: SHIPPED | OUTPATIENT
Start: 2020-07-12 | End: 2020-07-19

## 2020-07-12 RX ORDER — MONTELUKAST SODIUM 10 MG/1
10 TABLET ORAL DAILY
COMMUNITY
End: 2021-01-26 | Stop reason: SDUPTHER

## 2020-07-12 RX ORDER — METRONIDAZOLE 500 MG/1
500 TABLET ORAL 3 TIMES DAILY
Qty: 21 TABLET | Refills: 0 | Status: SHIPPED | OUTPATIENT
Start: 2020-07-12 | End: 2020-07-19

## 2020-07-12 RX ADMIN — ACETAMINOPHEN 650 MG: 325 TABLET ORAL at 03:07

## 2020-07-12 RX ADMIN — IOHEXOL 100 ML: 350 INJECTION, SOLUTION INTRAVENOUS at 03:07

## 2020-07-13 NOTE — ED PROVIDER NOTES
Encounter Date: 7/12/2020       History     Chief Complaint   Patient presents with    Abdominal Pain     X 1 DAY    Nausea     HPI     Seen and examined. Presented with a chief complaint of abdominal pain.  She reports a 1 day history of abdominal pain that localizes to the left lower quadrant.  She has associated fevers symptoms are moderate to severe persistent ongoing.  She denies any alleviating or exacerbating factors.  This is an acute exacerbation.  She has a history of diverticulitis.  The pain is described as sharp sticking and is moderate to severe.      Review of patient's allergies indicates:   Allergen Reactions    Biaxin [clarithromycin]     Macrodantin [nitrofurantoin macrocrystalline]     Penicillins      Past Medical History:   Diagnosis Date    Diverticulitis     Encounter for blood transfusion     Hemorrhage     from rectum    Hepatitis A     High cholesterol     Hypertension      Past Surgical History:   Procedure Laterality Date    APPENDECTOMY      COLONOSCOPY      HYSTERECTOMY      JOINT REPLACEMENT      KNEE SURGERY  05/01/2019    Right TKA    TONSILLECTOMY       No family history on file.  Social History     Tobacco Use    Smoking status: Former Smoker     Years: 5.00     Types: Cigarettes    Smokeless tobacco: Never Used   Substance Use Topics    Alcohol use: No    Drug use: No     Review of Systems   Constitutional: Positive for fever.   Respiratory: Negative for shortness of breath.    Cardiovascular: Negative for chest pain.   Gastrointestinal: Positive for abdominal pain. Negative for nausea.   Genitourinary: Negative for dysuria.   Musculoskeletal: Negative for back pain.   Skin: Negative for rash.   Neurological: Negative for weakness.   Hematological: Does not bruise/bleed easily.   Psychiatric/Behavioral: Negative for confusion.   All other systems reviewed and are negative.      Physical Exam     Initial Vitals [07/12/20 1307]   BP Pulse Resp Temp SpO2   (!)  149/82 88 16 (!) 100.4 °F (38 °C) 98 %      MAP       --         Physical Exam    Nursing note and vitals reviewed.  Constitutional: She appears well-developed and well-nourished.   HENT:   Head: Normocephalic and atraumatic.   Eyes: Conjunctivae are normal.   Cardiovascular: Normal rate.   Pulmonary/Chest:   Normal pulmonary effort     Abdominal: Soft. Normal appearance. There is abdominal tenderness.   Musculoskeletal: Normal range of motion.   Neurological: She is alert and oriented to person, place, and time.   Skin: Skin is warm and dry.   Psychiatric: She has a normal mood and affect. Her speech is normal.         ED Course   Procedures  Labs Reviewed   CBC W/ AUTO DIFFERENTIAL - Abnormal; Notable for the following components:       Result Value    WBC 13.06 (*)     Gran # (ANC) 8.4 (*)     Mono # 1.3 (*)     All other components within normal limits   COMPREHENSIVE METABOLIC PANEL - Abnormal; Notable for the following components:    Total Bilirubin 1.4 (*)     All other components within normal limits   URINALYSIS, REFLEX TO URINE CULTURE - Abnormal; Notable for the following components:    Protein, UA Trace (*)     Occult Blood UA Trace (*)     Urobilinogen, UA 2.0-3.0 (*)     Leukocytes, UA 1+ (*)     All other components within normal limits    Narrative:     Specimen Source->Urine   URINALYSIS MICROSCOPIC - Abnormal; Notable for the following components:    WBC, UA 7 (*)     Hyaline Casts, UA 18 (*)     All other components within normal limits    Narrative:     Specimen Source->Urine   LIPASE   LACTIC ACID, PLASMA        ECG Results          EKG 12-lead (In process)  Result time 07/12/20 18:23:32    In process by Interface, Lab In [facility] (07/12/20 18:23:32)                 Narrative:    Test Reason : R10.9,    Vent. Rate : 072 BPM     Atrial Rate : 072 BPM     P-R Int : 174 ms          QRS Dur : 076 ms      QT Int : 402 ms       P-R-T Axes : 000 232 175 degrees     QTc Int : 440 ms     Suspect arm lead  reversal, interpretation assumes no reversal  Normal sinus rhythm  Right superior axis deviation  Inferior-posterior infarct ,age undetermined  Abnormal ECG  When compared with ECG of 11-APR-2019 11:10,  Inferior-posterior infarct is now Present  T wave inversion less evident in Anterior leads  T wave inversion now evident in Lateral leads    Referred By: [email or web address]   SELF           Confirmed By:                             Imaging Results          CT Abdomen Pelvis With Contrast (Edited Result - FINAL)  Result time 07/12/20 15:51:42    Final result by Alejandro Mejia Jr., MD (07/12/20 15:51:42)                 Narrative:    Examination: CT scan of the abdomen and pelvis with contrast.    CLINICAL HISTORY: Left lower quadrant pain. Diverticulitis suspected.    COMPARISON: None.    Technical factors: Cross-sectional evaluation the abdomen and pelvis  was performed from the top of the diaphragms the region below the  symphysis pubis and standard helical fashion following the bolus  infusion of 100 cc of intravenous contrast. No enteric contrast was  employed. DLP is measured at 765.2.    CMS MANDATED QUALITY DATA - CT RADIATION  436    All CT scans at this facility utilize dose modulation, iterative  reconstruction, and/or weight based dosing when appropriate to reduce  radiation dose to as low as reasonably achievable.    Findings: Left upper quadrant of is trace scattered diverticular  changes tracking through the proximal descending colon with evidence  fairly profound perimesenteric stranding secondary to acute  diverticulitis. Continued migration of the diverticular formation  throughout the remainder of the descending colon with abundance of a  diverticular formation throughout the sigmoid portion of the colon.  Chronic narrowing of the sigmoid colon noted with evidence of fairly  profound mucosal thickening due to long-standing diverticulosis. No  sigmoid diverticulitis. The unenhanced liver appears normal.  No  significant splenic abnormality. Gallbladder distended but otherwise  normal. Normal pancreas. Scattered nodes in the gastrohepatic  perforation. The portal vein wide-open. Normal renal perfusion with  evidence of small extrarenal pelvises noted bilaterally. Normal renal  function. There is evidence of hydronephrosis. No evidence of mass  lesion. There are prominent atheromatous calcification of the  descending abdominal aorta. Urinary bladder normal. No free fluid in  the pelvic cavity. Postop changes of previous hysterectomy are noted.  No gross skeletal abnormality.    IMPRESSION:  1. Prominent diverticular changes about the proximal ascending colon  with evidence of fairly advanced pericolonic perching due to acute  diverticulitis.  2. Sigmoid thickening and numerous diverticula due to long-standing  diverticulosis.  3. Postoperative changes of previous hysterectomy change.  4. Scattered nodes in the central mesentery likely due to low-grade  inflammatory process.  5. Bilateral extrarenal pelvises.    Electronically Signed by Alejandro KILLIAN on 7/12/2020 4:29 PM                               Medical Decision Making:   Initial Assessment:   Examined abdominal pain mild leukocytosis.  Normal lactic acid.  CT evidence of diverticulitis.  Discussed possibility of admission versus home care.  Patient would like to try home care.  Given return precautions.  Discharged with antibiotics antiemetics and pain control.  Clinically patient appears nontoxic at time of discharge                                 Clinical Impression:       ICD-10-CM ICD-9-CM   1. Abdominal pain, unspecified abdominal location  R10.9 789.00   2. Acute diverticulitis  K57.92 562.11   3. Abdominal pain  R10.9 789.00             ED Disposition Condition    Discharge Stable        ED Prescriptions     Medication Sig Dispense Start Date End Date Auth. Provider    ondansetron (ZOFRAN) 4 MG tablet Take 1 tablet (4 mg total) by mouth every 6  (six) hours. 12 tablet 7/12/2020  Soto Reyes Jr., MD    HYDROcodone-acetaminophen (NORCO) 5-325 mg per tablet Take 1 tablet by mouth every 6 (six) hours as needed for Pain. 18 tablet 7/12/2020  Soto Reyes Jr., MD    ciprofloxacin HCl (CIPRO) 500 MG tablet Take 1 tablet (500 mg total) by mouth 2 (two) times daily. for 7 days 14 tablet 7/12/2020 7/19/2020 Soto Reyes Jr., MD    metroNIDAZOLE (FLAGYL) 500 MG tablet Take 1 tablet (500 mg total) by mouth 3 (three) times daily. for 7 days 21 tablet 7/12/2020 7/19/2020 Soto Reyes Jr., MD        Follow-up Information     Follow up With Specialties Details Why Contact Info Additional Information    Rubens Alvarez MD Internal Medicine Go to  As needed [address]  [place] [address]  [phone]       [facility] Go to  As needed [address] [address]  [phone] 1st floor                                     Soto Reyes Jr., MD  07/13/20 4988

## 2020-09-18 LAB
CHOLESTEROL, TOTAL: 128
HDLC SERPL-MCNC: 36 MG/DL
LDLC SERPL CALC-MCNC: 65 MG/DL (ref 0–160)
TRIGLYCERIDE (LIPID PAN): 209

## 2021-01-04 ENCOUNTER — TELEPHONE (OUTPATIENT)
Dept: FAMILY MEDICINE | Facility: CLINIC | Age: 71
End: 2021-01-04

## 2021-01-04 DIAGNOSIS — I10 GOOD HYPERTENSION CONTROL: Primary | ICD-10-CM

## 2021-01-04 DIAGNOSIS — E78.5 HYPERLIPIDEMIA, UNSPECIFIED HYPERLIPIDEMIA TYPE: ICD-10-CM

## 2021-01-06 ENCOUNTER — TELEPHONE (OUTPATIENT)
Dept: FAMILY MEDICINE | Facility: CLINIC | Age: 71
End: 2021-01-06

## 2021-01-20 ENCOUNTER — LAB VISIT (OUTPATIENT)
Dept: LAB | Facility: HOSPITAL | Age: 71
End: 2021-01-20
Attending: PHYSICIAN ASSISTANT
Payer: MEDICARE

## 2021-01-20 DIAGNOSIS — E78.5 HYPERLIPIDEMIA, UNSPECIFIED HYPERLIPIDEMIA TYPE: ICD-10-CM

## 2021-01-20 DIAGNOSIS — I10 GOOD HYPERTENSION CONTROL: ICD-10-CM

## 2021-01-20 LAB
ALBUMIN SERPL BCP-MCNC: 4 G/DL (ref 3.5–5.2)
ALP SERPL-CCNC: 93 U/L (ref 55–135)
ALT SERPL W/O P-5'-P-CCNC: 16 U/L (ref 10–44)
ANION GAP SERPL CALC-SCNC: 11 MMOL/L (ref 8–16)
AST SERPL-CCNC: 18 U/L (ref 10–40)
BASOPHILS # BLD AUTO: 0.04 K/UL (ref 0–0.2)
BASOPHILS NFR BLD: 0.5 % (ref 0–1.9)
BILIRUB SERPL-MCNC: 0.8 MG/DL (ref 0.1–1)
BUN SERPL-MCNC: 16 MG/DL (ref 8–23)
CALCIUM SERPL-MCNC: 9.2 MG/DL (ref 8.7–10.5)
CHLORIDE SERPL-SCNC: 107 MMOL/L (ref 95–110)
CHOLEST SERPL-MCNC: 139 MG/DL (ref 120–199)
CHOLEST/HDLC SERPL: 3.7 {RATIO} (ref 2–5)
CO2 SERPL-SCNC: 26 MMOL/L (ref 23–29)
CREAT SERPL-MCNC: 0.9 MG/DL (ref 0.5–1.4)
DIFFERENTIAL METHOD: NORMAL
EOSINOPHIL # BLD AUTO: 0.2 K/UL (ref 0–0.5)
EOSINOPHIL NFR BLD: 3 % (ref 0–8)
ERYTHROCYTE [DISTWIDTH] IN BLOOD BY AUTOMATED COUNT: 12.7 % (ref 11.5–14.5)
EST. GFR  (AFRICAN AMERICAN): >60 ML/MIN/1.73 M^2
EST. GFR  (NON AFRICAN AMERICAN): >60 ML/MIN/1.73 M^2
GLUCOSE SERPL-MCNC: 94 MG/DL (ref 70–110)
HCT VFR BLD AUTO: 38.1 % (ref 37–48.5)
HDLC SERPL-MCNC: 38 MG/DL (ref 40–75)
HDLC SERPL: 27.3 % (ref 20–50)
HGB BLD-MCNC: 12.2 G/DL (ref 12–16)
IMM GRANULOCYTES # BLD AUTO: 0.02 K/UL (ref 0–0.04)
IMM GRANULOCYTES NFR BLD AUTO: 0.3 % (ref 0–0.5)
LDLC SERPL CALC-MCNC: 65.2 MG/DL (ref 63–159)
LYMPHOCYTES # BLD AUTO: 3.2 K/UL (ref 1–4.8)
LYMPHOCYTES NFR BLD: 41 % (ref 18–48)
MCH RBC QN AUTO: 29.5 PG (ref 27–31)
MCHC RBC AUTO-ENTMCNC: 32 G/DL (ref 32–36)
MCV RBC AUTO: 92 FL (ref 82–98)
MONOCYTES # BLD AUTO: 0.7 K/UL (ref 0.3–1)
MONOCYTES NFR BLD: 8.5 % (ref 4–15)
NEUTROPHILS # BLD AUTO: 3.6 K/UL (ref 1.8–7.7)
NEUTROPHILS NFR BLD: 46.7 % (ref 38–73)
NONHDLC SERPL-MCNC: 101 MG/DL
NRBC BLD-RTO: 0 /100 WBC
PLATELET # BLD AUTO: 260 K/UL (ref 150–350)
PMV BLD AUTO: 10.3 FL (ref 9.2–12.9)
POTASSIUM SERPL-SCNC: 4 MMOL/L (ref 3.5–5.1)
PROT SERPL-MCNC: 7.5 G/DL (ref 6–8.4)
RBC # BLD AUTO: 4.14 M/UL (ref 4–5.4)
SODIUM SERPL-SCNC: 144 MMOL/L (ref 136–145)
TRIGL SERPL-MCNC: 179 MG/DL (ref 30–150)
TSH SERPL DL<=0.005 MIU/L-ACNC: 1.6 UIU/ML (ref 0.4–4)
WBC # BLD AUTO: 7.74 K/UL (ref 3.9–12.7)

## 2021-01-20 PROCEDURE — 80053 COMPREHEN METABOLIC PANEL: CPT

## 2021-01-20 PROCEDURE — 80061 LIPID PANEL: CPT

## 2021-01-20 PROCEDURE — 36415 COLL VENOUS BLD VENIPUNCTURE: CPT

## 2021-01-20 PROCEDURE — 84443 ASSAY THYROID STIM HORMONE: CPT

## 2021-01-20 PROCEDURE — 85025 COMPLETE CBC W/AUTO DIFF WBC: CPT

## 2021-01-25 RX ORDER — LATANOPROST 50 UG/ML
SOLUTION/ DROPS OPHTHALMIC
COMMUNITY
Start: 2021-01-13 | End: 2021-02-22

## 2021-01-26 ENCOUNTER — OFFICE VISIT (OUTPATIENT)
Dept: FAMILY MEDICINE | Facility: CLINIC | Age: 71
End: 2021-01-26
Payer: MEDICARE

## 2021-01-26 VITALS
WEIGHT: 189 LBS | DIASTOLIC BLOOD PRESSURE: 84 MMHG | BODY MASS INDEX: 32.27 KG/M2 | TEMPERATURE: 97 F | HEIGHT: 64 IN | HEART RATE: 81 BPM | SYSTOLIC BLOOD PRESSURE: 136 MMHG | OXYGEN SATURATION: 95 %

## 2021-01-26 DIAGNOSIS — Z11.59 NEED FOR HEPATITIS C SCREENING TEST: ICD-10-CM

## 2021-01-26 DIAGNOSIS — I10 GOOD HYPERTENSION CONTROL: ICD-10-CM

## 2021-01-26 DIAGNOSIS — K57.31 DIVERTICULOSIS OF COLON WITH HEMORRHAGE: Primary | ICD-10-CM

## 2021-01-26 DIAGNOSIS — H26.9 CATARACT OF BOTH EYES, UNSPECIFIED CATARACT TYPE: ICD-10-CM

## 2021-01-26 DIAGNOSIS — Z12.31 BREAST CANCER SCREENING BY MAMMOGRAM: ICD-10-CM

## 2021-01-26 DIAGNOSIS — E78.5 HYPERLIPIDEMIA, UNSPECIFIED HYPERLIPIDEMIA TYPE: ICD-10-CM

## 2021-01-26 DIAGNOSIS — J32.9 SINUSITIS, UNSPECIFIED CHRONICITY, UNSPECIFIED LOCATION: ICD-10-CM

## 2021-01-26 DIAGNOSIS — I70.0 ABDOMINAL AORTIC ATHEROSCLEROSIS: ICD-10-CM

## 2021-01-26 DIAGNOSIS — J30.1 SEASONAL ALLERGIC RHINITIS DUE TO POLLEN: ICD-10-CM

## 2021-01-26 PROCEDURE — 99204 OFFICE O/P NEW MOD 45 MIN: CPT | Mod: S$PBB,,, | Performed by: PHYSICIAN ASSISTANT

## 2021-01-26 PROCEDURE — 99999 PR PBB SHADOW E&M-EST. PATIENT-LVL V: CPT | Mod: PBBFAC,,, | Performed by: PHYSICIAN ASSISTANT

## 2021-01-26 PROCEDURE — 99204 PR OFFICE/OUTPT VISIT, NEW, LEVL IV, 45-59 MIN: ICD-10-PCS | Mod: S$PBB,,, | Performed by: PHYSICIAN ASSISTANT

## 2021-01-26 PROCEDURE — 99999 PR PBB SHADOW E&M-EST. PATIENT-LVL V: ICD-10-PCS | Mod: PBBFAC,,, | Performed by: PHYSICIAN ASSISTANT

## 2021-01-26 PROCEDURE — 99215 OFFICE O/P EST HI 40 MIN: CPT | Mod: PBBFAC,PN | Performed by: PHYSICIAN ASSISTANT

## 2021-01-26 RX ORDER — MONTELUKAST SODIUM 10 MG/1
10 TABLET ORAL DAILY
Qty: 90 TABLET | Refills: 2 | Status: SHIPPED | OUTPATIENT
Start: 2021-01-26 | End: 2021-12-13 | Stop reason: SDUPTHER

## 2021-01-26 RX ORDER — DOXYCYCLINE 100 MG/1
100 CAPSULE ORAL 2 TIMES DAILY
Qty: 20 CAPSULE | Refills: 0 | Status: SHIPPED | OUTPATIENT
Start: 2021-01-26 | End: 2021-02-05

## 2021-01-26 RX ORDER — LISINOPRIL 5 MG/1
5 TABLET ORAL NIGHTLY
Qty: 90 TABLET | Refills: 3 | Status: SHIPPED | OUTPATIENT
Start: 2021-01-26 | End: 2021-06-28

## 2021-01-26 RX ORDER — FENOFIBRATE 54 MG/1
54 TABLET ORAL DAILY
Qty: 90 TABLET | Refills: 3 | Status: SHIPPED | OUTPATIENT
Start: 2021-01-26 | End: 2021-12-13

## 2021-01-26 RX ORDER — ROSUVASTATIN CALCIUM 40 MG/1
40 TABLET, COATED ORAL NIGHTLY
Qty: 90 TABLET | Refills: 3 | Status: SHIPPED | OUTPATIENT
Start: 2021-01-26 | End: 2021-12-13 | Stop reason: SDUPTHER

## 2021-01-29 ENCOUNTER — PATIENT OUTREACH (OUTPATIENT)
Dept: ADMINISTRATIVE | Facility: HOSPITAL | Age: 71
End: 2021-01-29

## 2021-02-08 ENCOUNTER — HOSPITAL ENCOUNTER (OUTPATIENT)
Dept: RADIOLOGY | Facility: HOSPITAL | Age: 71
Discharge: HOME | End: 2021-02-08
Attending: PHYSICIAN ASSISTANT
Payer: MEDICARE

## 2021-02-08 DIAGNOSIS — Z12.31 BREAST CANCER SCREENING BY MAMMOGRAM: ICD-10-CM

## 2021-02-08 PROCEDURE — 77063 BREAST TOMOSYNTHESIS BI: CPT | Mod: 26,,, | Performed by: RADIOLOGY

## 2021-02-08 PROCEDURE — 77067 SCR MAMMO BI INCL CAD: CPT | Mod: TC

## 2021-02-08 PROCEDURE — 77063 MAMMO DIGITAL SCREENING BILAT WITH TOMO: ICD-10-PCS | Mod: 26,,, | Performed by: RADIOLOGY

## 2021-02-08 PROCEDURE — 77067 MAMMO DIGITAL SCREENING BILAT WITH TOMO: ICD-10-PCS | Mod: 26,,, | Performed by: RADIOLOGY

## 2021-02-08 PROCEDURE — 77067 SCR MAMMO BI INCL CAD: CPT | Mod: 26,,, | Performed by: RADIOLOGY

## 2021-02-22 ENCOUNTER — OFFICE VISIT (OUTPATIENT)
Dept: OPHTHALMOLOGY | Facility: CLINIC | Age: 71
End: 2021-02-22
Payer: MEDICARE

## 2021-02-22 DIAGNOSIS — H25.13 AGE-RELATED NUCLEAR CATARACT, BILATERAL: Primary | ICD-10-CM

## 2021-02-22 DIAGNOSIS — H40.053 OCULAR HYPERTENSION, BILATERAL: ICD-10-CM

## 2021-02-22 PROCEDURE — 92020 GONIOSCOPY: CPT | Mod: S$PBB,,, | Performed by: OPHTHALMOLOGY

## 2021-02-22 PROCEDURE — 76514 PR  US, EYE, FOR CORNEAL THICKNESS: ICD-10-PCS | Mod: 26,S$PBB,, | Performed by: OPHTHALMOLOGY

## 2021-02-22 PROCEDURE — 92020 GONIOSCOPY: CPT | Mod: PBBFAC,PO | Performed by: OPHTHALMOLOGY

## 2021-02-22 PROCEDURE — 99999 PR PBB SHADOW E&M-EST. PATIENT-LVL III: ICD-10-PCS | Mod: PBBFAC,,, | Performed by: OPHTHALMOLOGY

## 2021-02-22 PROCEDURE — 99213 OFFICE O/P EST LOW 20 MIN: CPT | Mod: PBBFAC,PO,25 | Performed by: OPHTHALMOLOGY

## 2021-02-22 PROCEDURE — 76514 ECHO EXAM OF EYE THICKNESS: CPT | Mod: 26,S$PBB,, | Performed by: OPHTHALMOLOGY

## 2021-02-22 PROCEDURE — 92020 PR SPECIAL EYE EVAL,GONIOSCOPY: ICD-10-PCS | Mod: S$PBB,,, | Performed by: OPHTHALMOLOGY

## 2021-02-22 PROCEDURE — 92133 CPTRZD OPH DX IMG PST SGM ON: CPT | Mod: PBBFAC,PO | Performed by: OPHTHALMOLOGY

## 2021-02-22 PROCEDURE — 99204 OFFICE O/P NEW MOD 45 MIN: CPT | Mod: S$PBB,,, | Performed by: OPHTHALMOLOGY

## 2021-02-22 PROCEDURE — 99999 PR PBB SHADOW E&M-EST. PATIENT-LVL III: CPT | Mod: PBBFAC,,, | Performed by: OPHTHALMOLOGY

## 2021-02-22 PROCEDURE — 92133 POSTERIOR SEGMENT OCT OPTIC NERVE(OCULAR COHERENCE TOMOGRAPHY) - OU - BOTH EYES: ICD-10-PCS | Mod: 26,S$PBB,, | Performed by: OPHTHALMOLOGY

## 2021-02-22 PROCEDURE — 76514 ECHO EXAM OF EYE THICKNESS: CPT | Mod: PBBFAC,PO | Performed by: OPHTHALMOLOGY

## 2021-02-22 PROCEDURE — 99204 PR OFFICE/OUTPT VISIT, NEW, LEVL IV, 45-59 MIN: ICD-10-PCS | Mod: S$PBB,,, | Performed by: OPHTHALMOLOGY

## 2021-02-24 ENCOUNTER — PATIENT OUTREACH (OUTPATIENT)
Dept: ADMINISTRATIVE | Facility: HOSPITAL | Age: 71
End: 2021-02-24

## 2021-02-24 ENCOUNTER — HOSPITAL ENCOUNTER (EMERGENCY)
Facility: HOSPITAL | Age: 71
Discharge: HOME | End: 2021-02-24
Attending: EMERGENCY MEDICINE
Payer: MEDICARE

## 2021-02-24 VITALS
TEMPERATURE: 99 F | OXYGEN SATURATION: 98 % | DIASTOLIC BLOOD PRESSURE: 82 MMHG | WEIGHT: 189 LBS | HEART RATE: 98 BPM | RESPIRATION RATE: 20 BRPM | SYSTOLIC BLOOD PRESSURE: 126 MMHG | BODY MASS INDEX: 32.44 KG/M2

## 2021-02-24 DIAGNOSIS — R00.2 PALPITATIONS: ICD-10-CM

## 2021-02-24 DIAGNOSIS — R06.02 SOB (SHORTNESS OF BREATH): Primary | ICD-10-CM

## 2021-02-24 LAB
ALBUMIN SERPL BCP-MCNC: 4.3 G/DL (ref 3.5–5.2)
ALP SERPL-CCNC: 103 U/L (ref 55–135)
ALT SERPL W/O P-5'-P-CCNC: 17 U/L (ref 10–44)
ANION GAP SERPL CALC-SCNC: 11 MMOL/L (ref 8–16)
AST SERPL-CCNC: 19 U/L (ref 10–40)
BASOPHILS # BLD AUTO: 0.08 K/UL (ref 0–0.2)
BASOPHILS NFR BLD: 0.8 % (ref 0–1.9)
BILIRUB SERPL-MCNC: 0.6 MG/DL (ref 0.1–1)
BUN SERPL-MCNC: 22 MG/DL (ref 8–23)
CALCIUM SERPL-MCNC: 10 MG/DL (ref 8.7–10.5)
CHLORIDE SERPL-SCNC: 104 MMOL/L (ref 95–110)
CO2 SERPL-SCNC: 21 MMOL/L (ref 23–29)
CREAT SERPL-MCNC: 1 MG/DL (ref 0.5–1.4)
D DIMER PPP IA.FEU-MCNC: 0.48 MG/L FEU
DIFFERENTIAL METHOD: NORMAL
EOSINOPHIL # BLD AUTO: 0.2 K/UL (ref 0–0.5)
EOSINOPHIL NFR BLD: 1.9 % (ref 0–8)
ERYTHROCYTE [DISTWIDTH] IN BLOOD BY AUTOMATED COUNT: 12.3 % (ref 11.5–14.5)
EST. GFR  (AFRICAN AMERICAN): >60 ML/MIN/1.73 M^2
EST. GFR  (NON AFRICAN AMERICAN): 58 ML/MIN/1.73 M^2
GLUCOSE SERPL-MCNC: 107 MG/DL (ref 70–110)
HCT VFR BLD AUTO: 42.9 % (ref 37–48.5)
HGB BLD-MCNC: 14 G/DL (ref 12–16)
IMM GRANULOCYTES # BLD AUTO: 0.03 K/UL (ref 0–0.04)
IMM GRANULOCYTES NFR BLD AUTO: 0.3 % (ref 0–0.5)
LIPASE SERPL-CCNC: 56 U/L (ref 4–60)
LYMPHOCYTES # BLD AUTO: 3.6 K/UL (ref 1–4.8)
LYMPHOCYTES NFR BLD: 36 % (ref 18–48)
MCH RBC QN AUTO: 29.3 PG (ref 27–31)
MCHC RBC AUTO-ENTMCNC: 32.6 G/DL (ref 32–36)
MCV RBC AUTO: 90 FL (ref 82–98)
MONOCYTES # BLD AUTO: 0.8 K/UL (ref 0.3–1)
MONOCYTES NFR BLD: 7.7 % (ref 4–15)
NEUTROPHILS # BLD AUTO: 5.4 K/UL (ref 1.8–7.7)
NEUTROPHILS NFR BLD: 53.3 % (ref 38–73)
NRBC BLD-RTO: 0 /100 WBC
PLATELET # BLD AUTO: 302 K/UL (ref 150–350)
PMV BLD AUTO: 9.5 FL (ref 9.2–12.9)
POTASSIUM SERPL-SCNC: 4.6 MMOL/L (ref 3.5–5.1)
PROT SERPL-MCNC: 8 G/DL (ref 6–8.4)
RBC # BLD AUTO: 4.78 M/UL (ref 4–5.4)
SARS-COV-2 RDRP RESP QL NAA+PROBE: NEGATIVE
SODIUM SERPL-SCNC: 136 MMOL/L (ref 136–145)
WBC # BLD AUTO: 10.04 K/UL (ref 3.9–12.7)

## 2021-02-24 PROCEDURE — 36415 COLL VENOUS BLD VENIPUNCTURE: CPT

## 2021-02-24 PROCEDURE — 99284 EMERGENCY DEPT VISIT MOD MDM: CPT | Mod: 25

## 2021-02-24 PROCEDURE — 85379 FIBRIN DEGRADATION QUANT: CPT

## 2021-02-24 PROCEDURE — 93010 ELECTROCARDIOGRAM REPORT: CPT | Mod: ,,, | Performed by: SPECIALIST

## 2021-02-24 PROCEDURE — 83690 ASSAY OF LIPASE: CPT

## 2021-02-24 PROCEDURE — U0002 COVID-19 LAB TEST NON-CDC: HCPCS

## 2021-02-24 PROCEDURE — 93005 ELECTROCARDIOGRAM TRACING: CPT

## 2021-02-24 PROCEDURE — 80053 COMPREHEN METABOLIC PANEL: CPT

## 2021-02-24 PROCEDURE — 93010 EKG 12-LEAD: ICD-10-PCS | Mod: ,,, | Performed by: SPECIALIST

## 2021-02-24 PROCEDURE — 85025 COMPLETE CBC W/AUTO DIFF WBC: CPT

## 2021-04-14 ENCOUNTER — OFFICE VISIT (OUTPATIENT)
Dept: FAMILY MEDICINE | Facility: CLINIC | Age: 71
End: 2021-04-14
Payer: MEDICARE

## 2021-04-14 VITALS
BODY MASS INDEX: 32.41 KG/M2 | HEART RATE: 86 BPM | OXYGEN SATURATION: 96 % | DIASTOLIC BLOOD PRESSURE: 68 MMHG | SYSTOLIC BLOOD PRESSURE: 110 MMHG | WEIGHT: 189.81 LBS | TEMPERATURE: 99 F | HEIGHT: 64 IN

## 2021-04-14 DIAGNOSIS — H25.9 SENILE CATARACT OF RIGHT EYE, UNSPECIFIED AGE-RELATED CATARACT TYPE: Primary | ICD-10-CM

## 2021-04-14 DIAGNOSIS — Z01.818 PRE-OP EVALUATION: ICD-10-CM

## 2021-04-14 PROCEDURE — 99213 PR OFFICE/OUTPT VISIT, EST, LEVL III, 20-29 MIN: ICD-10-PCS | Mod: S$PBB,,, | Performed by: PHYSICIAN ASSISTANT

## 2021-04-14 PROCEDURE — 99213 OFFICE O/P EST LOW 20 MIN: CPT | Mod: PBBFAC,PN | Performed by: PHYSICIAN ASSISTANT

## 2021-04-14 PROCEDURE — 99999 PR PBB SHADOW E&M-EST. PATIENT-LVL III: ICD-10-PCS | Mod: PBBFAC,,, | Performed by: PHYSICIAN ASSISTANT

## 2021-04-14 PROCEDURE — 99999 PR PBB SHADOW E&M-EST. PATIENT-LVL III: CPT | Mod: PBBFAC,,, | Performed by: PHYSICIAN ASSISTANT

## 2021-04-14 PROCEDURE — 99213 OFFICE O/P EST LOW 20 MIN: CPT | Mod: S$PBB,,, | Performed by: PHYSICIAN ASSISTANT

## 2021-04-21 ENCOUNTER — PATIENT OUTREACH (OUTPATIENT)
Dept: ADMINISTRATIVE | Facility: OTHER | Age: 71
End: 2021-04-21

## 2021-04-22 ENCOUNTER — OFFICE VISIT (OUTPATIENT)
Dept: OPHTHALMOLOGY | Facility: CLINIC | Age: 71
End: 2021-04-22
Payer: MEDICARE

## 2021-04-22 DIAGNOSIS — H40.053 OCULAR HYPERTENSION, BILATERAL: ICD-10-CM

## 2021-04-22 DIAGNOSIS — Z01.818 PRE-OP TESTING: ICD-10-CM

## 2021-04-22 DIAGNOSIS — H25.13 AGE-RELATED NUCLEAR CATARACT, BILATERAL: Primary | ICD-10-CM

## 2021-04-22 PROCEDURE — 92136 IOL MASTER - OD - RIGHT EYE: ICD-10-PCS | Mod: 26,S$PBB,RT, | Performed by: OPHTHALMOLOGY

## 2021-04-22 PROCEDURE — 99213 OFFICE O/P EST LOW 20 MIN: CPT | Mod: PBBFAC,PO,25 | Performed by: OPHTHALMOLOGY

## 2021-04-22 PROCEDURE — 92136 OPHTHALMIC BIOMETRY: CPT | Mod: PBBFAC,PO,RT | Performed by: OPHTHALMOLOGY

## 2021-04-22 PROCEDURE — 99999 PR PBB SHADOW E&M-EST. PATIENT-LVL III: ICD-10-PCS | Mod: PBBFAC,,, | Performed by: OPHTHALMOLOGY

## 2021-04-22 PROCEDURE — 99999 PR PBB SHADOW E&M-EST. PATIENT-LVL III: CPT | Mod: PBBFAC,,, | Performed by: OPHTHALMOLOGY

## 2021-04-22 PROCEDURE — 99213 OFFICE O/P EST LOW 20 MIN: CPT | Mod: S$PBB,,, | Performed by: OPHTHALMOLOGY

## 2021-04-22 PROCEDURE — 99213 PR OFFICE/OUTPT VISIT, EST, LEVL III, 20-29 MIN: ICD-10-PCS | Mod: S$PBB,,, | Performed by: OPHTHALMOLOGY

## 2021-04-22 RX ORDER — TROPICAMIDE 10 MG/ML
1 SOLUTION/ DROPS OPHTHALMIC
Status: CANCELLED | OUTPATIENT
Start: 2021-04-22

## 2021-04-22 RX ORDER — PHENYLEPHRINE HYDROCHLORIDE 25 MG/ML
1 SOLUTION/ DROPS OPHTHALMIC
Status: CANCELLED | OUTPATIENT
Start: 2021-04-22

## 2021-04-22 RX ORDER — PREDNISOLONE ACETATE 10 MG/ML
1 SUSPENSION/ DROPS OPHTHALMIC 4 TIMES DAILY
Qty: 5 ML | Refills: 2 | Status: SHIPPED | OUTPATIENT
Start: 2021-04-22 | End: 2021-06-10 | Stop reason: ALTCHOICE

## 2021-04-22 RX ORDER — PROPARACAINE HYDROCHLORIDE 5 MG/ML
1 SOLUTION/ DROPS OPHTHALMIC
Status: CANCELLED | OUTPATIENT
Start: 2021-04-22

## 2021-04-22 RX ORDER — KETOROLAC TROMETHAMINE 5 MG/ML
1 SOLUTION OPHTHALMIC 4 TIMES DAILY
Qty: 5 ML | Refills: 2 | Status: SHIPPED | OUTPATIENT
Start: 2021-04-22 | End: 2021-06-10 | Stop reason: ALTCHOICE

## 2021-04-22 RX ORDER — SODIUM CHLORIDE 9 MG/ML
INJECTION, SOLUTION INTRAVENOUS CONTINUOUS
Status: CANCELLED | OUTPATIENT
Start: 2021-04-22

## 2021-04-22 RX ORDER — PHENYLEPHRINE HYDROCHLORIDE 100 MG/ML
1 SOLUTION/ DROPS OPHTHALMIC
Status: CANCELLED | OUTPATIENT
Start: 2021-04-22

## 2021-04-22 RX ORDER — MOXIFLOXACIN 5 MG/ML
1 SOLUTION/ DROPS OPHTHALMIC 4 TIMES DAILY
Qty: 3 ML | Refills: 0 | Status: SHIPPED | OUTPATIENT
Start: 2021-04-22 | End: 2021-04-29

## 2021-04-29 ENCOUNTER — TELEPHONE (OUTPATIENT)
Dept: OPTOMETRY | Facility: CLINIC | Age: 71
End: 2021-04-29

## 2021-05-02 ENCOUNTER — LAB VISIT (OUTPATIENT)
Dept: PRIMARY CARE CLINIC | Facility: CLINIC | Age: 71
End: 2021-05-02
Payer: MEDICARE

## 2021-05-02 DIAGNOSIS — Z01.818 PRE-OP TESTING: ICD-10-CM

## 2021-05-02 PROCEDURE — U0005 INFEC AGEN DETEC AMPLI PROBE: HCPCS | Performed by: OPHTHALMOLOGY

## 2021-05-02 PROCEDURE — U0003 INFECTIOUS AGENT DETECTION BY NUCLEIC ACID (DNA OR RNA); SEVERE ACUTE RESPIRATORY SYNDROME CORONAVIRUS 2 (SARS-COV-2) (CORONAVIRUS DISEASE [COVID-19]), AMPLIFIED PROBE TECHNIQUE, MAKING USE OF HIGH THROUGHPUT TECHNOLOGIES AS DESCRIBED BY CMS-2020-01-R: HCPCS | Performed by: OPHTHALMOLOGY

## 2021-05-03 LAB — SARS-COV-2 RNA RESP QL NAA+PROBE: NOT DETECTED

## 2021-05-04 ENCOUNTER — ANESTHESIA EVENT (OUTPATIENT)
Dept: SURGERY | Facility: AMBULARY SURGERY CENTER | Age: 71
End: 2021-05-04
Payer: MEDICARE

## 2021-05-05 ENCOUNTER — ANESTHESIA (OUTPATIENT)
Dept: SURGERY | Facility: AMBULARY SURGERY CENTER | Age: 71
End: 2021-05-05
Payer: MEDICARE

## 2021-05-05 ENCOUNTER — HOSPITAL ENCOUNTER (OUTPATIENT)
Facility: AMBULARY SURGERY CENTER | Age: 71
Discharge: HOME | End: 2021-05-05
Attending: OPHTHALMOLOGY | Admitting: OPHTHALMOLOGY
Payer: MEDICARE

## 2021-05-05 DIAGNOSIS — H25.13 AGE-RELATED NUCLEAR CATARACT, BILATERAL: ICD-10-CM

## 2021-05-05 PROCEDURE — D9220A PRA ANESTHESIA: Mod: ANES,,, | Performed by: ANESTHESIOLOGY

## 2021-05-05 PROCEDURE — 66984 XCAPSL CTRC RMVL W/O ECP: CPT | Performed by: OPHTHALMOLOGY

## 2021-05-05 PROCEDURE — 66984 XCAPSL CTRC RMVL W/O ECP: CPT | Mod: RT,,, | Performed by: OPHTHALMOLOGY

## 2021-05-05 PROCEDURE — D9220A PRA ANESTHESIA: Mod: CRNA,,, | Performed by: NURSE ANESTHETIST, CERTIFIED REGISTERED

## 2021-05-05 PROCEDURE — D9220A PRA ANESTHESIA: ICD-10-PCS | Mod: CRNA,,, | Performed by: NURSE ANESTHETIST, CERTIFIED REGISTERED

## 2021-05-05 PROCEDURE — 66984 PR REMOVAL, CATARACT, W/INSRT INTRAOC LENS, W/O ENDO CYCLO: ICD-10-PCS | Mod: RT,,, | Performed by: OPHTHALMOLOGY

## 2021-05-05 PROCEDURE — D9220A PRA ANESTHESIA: ICD-10-PCS | Mod: ANES,,, | Performed by: ANESTHESIOLOGY

## 2021-05-05 DEVICE — TECNIS SMPLCTY TECNIS 1PC CLR MONO 16.0D
Type: IMPLANTABLE DEVICE | Site: EYE | Status: FUNCTIONAL
Brand: TECNIS SIMPLICITY

## 2021-05-05 RX ORDER — PHENYLEPHRINE HYDROCHLORIDE 100 MG/ML
1 SOLUTION/ DROPS OPHTHALMIC
Status: DISCONTINUED | OUTPATIENT
Start: 2021-05-05 | End: 2021-05-05 | Stop reason: HOSPADM

## 2021-05-05 RX ORDER — MOXIFLOXACIN 5 MG/ML
SOLUTION/ DROPS OPHTHALMIC
Status: DISCONTINUED
Start: 2021-05-05 | End: 2021-05-05 | Stop reason: WASHOUT

## 2021-05-05 RX ORDER — LIDOCAINE HYDROCHLORIDE 10 MG/ML
1 INJECTION, SOLUTION EPIDURAL; INFILTRATION; INTRACAUDAL; PERINEURAL ONCE
Status: DISCONTINUED | OUTPATIENT
Start: 2021-05-05 | End: 2021-05-05 | Stop reason: HOSPADM

## 2021-05-05 RX ORDER — EPINEPHRINE 1 MG/ML
INJECTION, SOLUTION INTRACARDIAC; INTRAMUSCULAR; INTRAVENOUS; SUBCUTANEOUS
Status: DISCONTINUED | OUTPATIENT
Start: 2021-05-05 | End: 2021-05-05 | Stop reason: HOSPADM

## 2021-05-05 RX ORDER — ONDANSETRON 2 MG/ML
INJECTION INTRAMUSCULAR; INTRAVENOUS
Status: DISCONTINUED | OUTPATIENT
Start: 2021-05-05 | End: 2021-05-05

## 2021-05-05 RX ORDER — SODIUM CHLORIDE 9 MG/ML
INJECTION, SOLUTION INTRAVENOUS CONTINUOUS
Status: DISCONTINUED | OUTPATIENT
Start: 2021-05-05 | End: 2021-05-05 | Stop reason: HOSPADM

## 2021-05-05 RX ORDER — TROPICAMIDE 10 MG/ML
1 SOLUTION/ DROPS OPHTHALMIC
Status: DISCONTINUED | OUTPATIENT
Start: 2021-05-05 | End: 2021-05-05 | Stop reason: HOSPADM

## 2021-05-05 RX ORDER — TETRACAINE HYDROCHLORIDE 5 MG/ML
SOLUTION OPHTHALMIC
Status: DISCONTINUED | OUTPATIENT
Start: 2021-05-05 | End: 2021-05-05 | Stop reason: HOSPADM

## 2021-05-05 RX ORDER — MOXIFLOXACIN 5 MG/ML
SOLUTION/ DROPS OPHTHALMIC
Status: DISCONTINUED | OUTPATIENT
Start: 2021-05-05 | End: 2021-05-05 | Stop reason: HOSPADM

## 2021-05-05 RX ORDER — PHENYLEPHRINE HYDROCHLORIDE 25 MG/ML
1 SOLUTION/ DROPS OPHTHALMIC
Status: DISCONTINUED | OUTPATIENT
Start: 2021-05-05 | End: 2021-05-05 | Stop reason: HOSPADM

## 2021-05-05 RX ORDER — PROPARACAINE HYDROCHLORIDE 5 MG/ML
1 SOLUTION/ DROPS OPHTHALMIC
Status: DISCONTINUED | OUTPATIENT
Start: 2021-05-05 | End: 2021-05-05 | Stop reason: HOSPADM

## 2021-05-05 RX ORDER — MIDAZOLAM HYDROCHLORIDE 1 MG/ML
INJECTION, SOLUTION INTRAMUSCULAR; INTRAVENOUS
Status: DISCONTINUED | OUTPATIENT
Start: 2021-05-05 | End: 2021-05-05

## 2021-05-05 RX ORDER — LIDOCAINE HYDROCHLORIDE 40 MG/ML
INJECTION, SOLUTION RETROBULBAR
Status: DISCONTINUED | OUTPATIENT
Start: 2021-05-05 | End: 2021-05-05 | Stop reason: HOSPADM

## 2021-05-05 RX ORDER — EPINEPHRINE 1 MG/ML
INJECTION, SOLUTION INTRACARDIAC; INTRAMUSCULAR; INTRAVENOUS; SUBCUTANEOUS
Status: DISCONTINUED
Start: 2021-05-05 | End: 2021-05-05 | Stop reason: HOSPADM

## 2021-05-05 RX ORDER — SODIUM CHLORIDE, SODIUM LACTATE, POTASSIUM CHLORIDE, CALCIUM CHLORIDE 600; 310; 30; 20 MG/100ML; MG/100ML; MG/100ML; MG/100ML
INJECTION, SOLUTION INTRAVENOUS CONTINUOUS
Status: DISCONTINUED | OUTPATIENT
Start: 2021-05-05 | End: 2021-05-05 | Stop reason: HOSPADM

## 2021-05-05 RX ADMIN — TROPICAMIDE 1 DROP: 10 SOLUTION/ DROPS OPHTHALMIC at 08:05

## 2021-05-05 RX ADMIN — PROPARACAINE HYDROCHLORIDE 1 DROP: 5 SOLUTION/ DROPS OPHTHALMIC at 08:05

## 2021-05-05 RX ADMIN — ONDANSETRON 4 MG: 2 INJECTION INTRAMUSCULAR; INTRAVENOUS at 09:05

## 2021-05-05 RX ADMIN — PHENYLEPHRINE HYDROCHLORIDE 1 DROP: 25 SOLUTION/ DROPS OPHTHALMIC at 08:05

## 2021-05-05 RX ADMIN — MIDAZOLAM HYDROCHLORIDE 1 MG: 1 INJECTION, SOLUTION INTRAMUSCULAR; INTRAVENOUS at 09:05

## 2021-05-05 RX ADMIN — SODIUM CHLORIDE, SODIUM LACTATE, POTASSIUM CHLORIDE, CALCIUM CHLORIDE: 600; 310; 30; 20 INJECTION, SOLUTION INTRAVENOUS at 08:05

## 2021-05-06 ENCOUNTER — OFFICE VISIT (OUTPATIENT)
Dept: OPHTHALMOLOGY | Facility: CLINIC | Age: 71
End: 2021-05-06
Payer: MEDICARE

## 2021-05-06 VITALS
TEMPERATURE: 98 F | BODY MASS INDEX: 32.27 KG/M2 | HEIGHT: 64 IN | SYSTOLIC BLOOD PRESSURE: 108 MMHG | RESPIRATION RATE: 18 BRPM | HEART RATE: 70 BPM | DIASTOLIC BLOOD PRESSURE: 67 MMHG | WEIGHT: 189 LBS | OXYGEN SATURATION: 98 %

## 2021-05-06 DIAGNOSIS — Z98.41 STATUS POST CATARACT SURGERY, RIGHT: Primary | ICD-10-CM

## 2021-05-06 PROCEDURE — 99024 POSTOP FOLLOW-UP VISIT: CPT | Mod: POP,,, | Performed by: OPHTHALMOLOGY

## 2021-05-06 PROCEDURE — 99999 PR PBB SHADOW E&M-EST. PATIENT-LVL III: ICD-10-PCS | Mod: PBBFAC,,, | Performed by: OPHTHALMOLOGY

## 2021-05-06 PROCEDURE — 99999 PR PBB SHADOW E&M-EST. PATIENT-LVL III: CPT | Mod: PBBFAC,,, | Performed by: OPHTHALMOLOGY

## 2021-05-06 PROCEDURE — 99213 OFFICE O/P EST LOW 20 MIN: CPT | Mod: PBBFAC,PO | Performed by: OPHTHALMOLOGY

## 2021-05-06 PROCEDURE — 99024 PR POST-OP FOLLOW-UP VISIT: ICD-10-PCS | Mod: POP,,, | Performed by: OPHTHALMOLOGY

## 2021-05-06 RX ORDER — LATANOPROST 50 UG/ML
1 SOLUTION/ DROPS OPHTHALMIC NIGHTLY
Qty: 2.5 ML | Refills: 3 | Status: SHIPPED | OUTPATIENT
Start: 2021-05-06 | End: 2021-07-29 | Stop reason: ALTCHOICE

## 2021-05-13 ENCOUNTER — OFFICE VISIT (OUTPATIENT)
Dept: OPHTHALMOLOGY | Facility: CLINIC | Age: 71
End: 2021-05-13
Payer: MEDICARE

## 2021-05-13 DIAGNOSIS — Z98.41 STATUS POST CATARACT SURGERY, RIGHT: Primary | ICD-10-CM

## 2021-05-13 PROCEDURE — 99213 OFFICE O/P EST LOW 20 MIN: CPT | Mod: PBBFAC,PO | Performed by: OPHTHALMOLOGY

## 2021-05-13 PROCEDURE — 99999 PR PBB SHADOW E&M-EST. PATIENT-LVL III: ICD-10-PCS | Mod: PBBFAC,,, | Performed by: OPHTHALMOLOGY

## 2021-05-13 PROCEDURE — 99999 PR PBB SHADOW E&M-EST. PATIENT-LVL III: CPT | Mod: PBBFAC,,, | Performed by: OPHTHALMOLOGY

## 2021-05-13 PROCEDURE — 99024 POSTOP FOLLOW-UP VISIT: CPT | Mod: POP,,, | Performed by: OPHTHALMOLOGY

## 2021-05-13 PROCEDURE — 99024 PR POST-OP FOLLOW-UP VISIT: ICD-10-PCS | Mod: POP,,, | Performed by: OPHTHALMOLOGY

## 2021-06-10 ENCOUNTER — OFFICE VISIT (OUTPATIENT)
Dept: OPHTHALMOLOGY | Facility: CLINIC | Age: 71
End: 2021-06-10
Payer: MEDICARE

## 2021-06-10 DIAGNOSIS — Z01.818 PRE-OP TESTING: ICD-10-CM

## 2021-06-10 DIAGNOSIS — H25.12 AGE-RELATED NUCLEAR CATARACT, LEFT: ICD-10-CM

## 2021-06-10 DIAGNOSIS — Z98.41 STATUS POST CATARACT SURGERY, RIGHT: Primary | ICD-10-CM

## 2021-06-10 PROCEDURE — 99999 PR PBB SHADOW E&M-EST. PATIENT-LVL III: ICD-10-PCS | Mod: PBBFAC,,, | Performed by: OPHTHALMOLOGY

## 2021-06-10 PROCEDURE — 92136 IOL MASTER - OS - LEFT EYE: ICD-10-PCS | Mod: 26,S$PBB,LT, | Performed by: OPHTHALMOLOGY

## 2021-06-10 PROCEDURE — 99024 POSTOP FOLLOW-UP VISIT: CPT | Mod: POP,,, | Performed by: OPHTHALMOLOGY

## 2021-06-10 PROCEDURE — 99024 PR POST-OP FOLLOW-UP VISIT: ICD-10-PCS | Mod: POP,,, | Performed by: OPHTHALMOLOGY

## 2021-06-10 PROCEDURE — 99999 PR PBB SHADOW E&M-EST. PATIENT-LVL III: CPT | Mod: PBBFAC,,, | Performed by: OPHTHALMOLOGY

## 2021-06-10 PROCEDURE — 99213 OFFICE O/P EST LOW 20 MIN: CPT | Mod: PBBFAC,PO,25 | Performed by: OPHTHALMOLOGY

## 2021-06-10 PROCEDURE — 92136 OPHTHALMIC BIOMETRY: CPT | Mod: PBBFAC,PO,LT | Performed by: OPHTHALMOLOGY

## 2021-06-10 RX ORDER — PHENYLEPHRINE HYDROCHLORIDE 25 MG/ML
1 SOLUTION/ DROPS OPHTHALMIC
Status: CANCELLED | OUTPATIENT
Start: 2021-06-10

## 2021-06-10 RX ORDER — KETOROLAC TROMETHAMINE 5 MG/ML
1 SOLUTION OPHTHALMIC 4 TIMES DAILY
Qty: 5 ML | Refills: 2 | Status: SHIPPED | OUTPATIENT
Start: 2021-06-10 | End: 2021-07-29 | Stop reason: ALTCHOICE

## 2021-06-10 RX ORDER — TROPICAMIDE 10 MG/ML
1 SOLUTION/ DROPS OPHTHALMIC
Status: CANCELLED | OUTPATIENT
Start: 2021-06-10

## 2021-06-10 RX ORDER — SODIUM CHLORIDE 9 MG/ML
INJECTION, SOLUTION INTRAVENOUS CONTINUOUS
Status: CANCELLED | OUTPATIENT
Start: 2021-06-10

## 2021-06-10 RX ORDER — MOXIFLOXACIN 5 MG/ML
1 SOLUTION/ DROPS OPHTHALMIC 4 TIMES DAILY
Qty: 3 ML | Refills: 1 | Status: SHIPPED | OUTPATIENT
Start: 2021-06-10 | End: 2021-06-17

## 2021-06-10 RX ORDER — PREDNISOLONE ACETATE 10 MG/ML
1 SUSPENSION/ DROPS OPHTHALMIC 4 TIMES DAILY
Qty: 5 ML | Refills: 2 | Status: SHIPPED | OUTPATIENT
Start: 2021-06-10 | End: 2021-07-29 | Stop reason: ALTCHOICE

## 2021-06-10 RX ORDER — PROPARACAINE HYDROCHLORIDE 5 MG/ML
1 SOLUTION/ DROPS OPHTHALMIC
Status: CANCELLED | OUTPATIENT
Start: 2021-06-10

## 2021-06-21 ENCOUNTER — LAB VISIT (OUTPATIENT)
Dept: LAB | Facility: HOSPITAL | Age: 71
End: 2021-06-21
Attending: PHYSICIAN ASSISTANT
Payer: MEDICARE

## 2021-06-21 DIAGNOSIS — E78.5 HYPERLIPIDEMIA, UNSPECIFIED HYPERLIPIDEMIA TYPE: ICD-10-CM

## 2021-06-21 DIAGNOSIS — Z11.59 NEED FOR HEPATITIS C SCREENING TEST: ICD-10-CM

## 2021-06-21 LAB
CHOLEST SERPL-MCNC: 146 MG/DL (ref 120–199)
CHOLEST/HDLC SERPL: 4.4 {RATIO} (ref 2–5)
HCV AB SERPL QL IA: NEGATIVE
HDLC SERPL-MCNC: 33 MG/DL (ref 40–75)
HDLC SERPL: 22.6 % (ref 20–50)
LDLC SERPL CALC-MCNC: 79.8 MG/DL (ref 63–159)
NONHDLC SERPL-MCNC: 113 MG/DL
TRIGL SERPL-MCNC: 166 MG/DL (ref 30–150)

## 2021-06-21 PROCEDURE — 36415 COLL VENOUS BLD VENIPUNCTURE: CPT | Performed by: PHYSICIAN ASSISTANT

## 2021-06-21 PROCEDURE — 80061 LIPID PANEL: CPT | Performed by: PHYSICIAN ASSISTANT

## 2021-06-21 PROCEDURE — 86803 HEPATITIS C AB TEST: CPT | Performed by: PHYSICIAN ASSISTANT

## 2021-06-27 ENCOUNTER — LAB VISIT (OUTPATIENT)
Dept: PRIMARY CARE CLINIC | Facility: CLINIC | Age: 71
End: 2021-06-27
Payer: MEDICARE

## 2021-06-27 DIAGNOSIS — Z01.818 PRE-OP TESTING: ICD-10-CM

## 2021-06-27 PROCEDURE — U0005 INFEC AGEN DETEC AMPLI PROBE: HCPCS | Performed by: OPHTHALMOLOGY

## 2021-06-27 PROCEDURE — U0003 INFECTIOUS AGENT DETECTION BY NUCLEIC ACID (DNA OR RNA); SEVERE ACUTE RESPIRATORY SYNDROME CORONAVIRUS 2 (SARS-COV-2) (CORONAVIRUS DISEASE [COVID-19]), AMPLIFIED PROBE TECHNIQUE, MAKING USE OF HIGH THROUGHPUT TECHNOLOGIES AS DESCRIBED BY CMS-2020-01-R: HCPCS | Performed by: OPHTHALMOLOGY

## 2021-06-28 ENCOUNTER — OFFICE VISIT (OUTPATIENT)
Dept: FAMILY MEDICINE | Facility: CLINIC | Age: 71
End: 2021-06-28
Payer: MEDICARE

## 2021-06-28 VITALS
WEIGHT: 188.06 LBS | BODY MASS INDEX: 32.11 KG/M2 | SYSTOLIC BLOOD PRESSURE: 118 MMHG | DIASTOLIC BLOOD PRESSURE: 78 MMHG | OXYGEN SATURATION: 97 % | HEART RATE: 84 BPM | TEMPERATURE: 98 F | HEIGHT: 64 IN

## 2021-06-28 DIAGNOSIS — I10 GOOD HYPERTENSION CONTROL: ICD-10-CM

## 2021-06-28 DIAGNOSIS — Z78.0 ASYMPTOMATIC MENOPAUSAL STATE: ICD-10-CM

## 2021-06-28 DIAGNOSIS — E78.5 HYPERLIPIDEMIA, UNSPECIFIED HYPERLIPIDEMIA TYPE: Primary | ICD-10-CM

## 2021-06-28 DIAGNOSIS — J45.990 EXERCISE-INDUCED ASTHMA: ICD-10-CM

## 2021-06-28 LAB — SARS-COV-2 RNA RESP QL NAA+PROBE: NOT DETECTED

## 2021-06-28 PROCEDURE — 99999 PR PBB SHADOW E&M-EST. PATIENT-LVL IV: CPT | Mod: PBBFAC,,, | Performed by: PHYSICIAN ASSISTANT

## 2021-06-28 PROCEDURE — 99213 OFFICE O/P EST LOW 20 MIN: CPT | Mod: S$PBB,,, | Performed by: PHYSICIAN ASSISTANT

## 2021-06-28 PROCEDURE — 99999 PR PBB SHADOW E&M-EST. PATIENT-LVL IV: ICD-10-PCS | Mod: PBBFAC,,, | Performed by: PHYSICIAN ASSISTANT

## 2021-06-28 PROCEDURE — 99214 OFFICE O/P EST MOD 30 MIN: CPT | Mod: PBBFAC,PN | Performed by: PHYSICIAN ASSISTANT

## 2021-06-28 PROCEDURE — 99213 PR OFFICE/OUTPT VISIT, EST, LEVL III, 20-29 MIN: ICD-10-PCS | Mod: S$PBB,,, | Performed by: PHYSICIAN ASSISTANT

## 2021-06-28 RX ORDER — ALBUTEROL SULFATE 90 UG/1
2 AEROSOL, METERED RESPIRATORY (INHALATION) EVERY 6 HOURS PRN
Qty: 18 G | Refills: 3 | Status: SHIPPED | OUTPATIENT
Start: 2021-06-28 | End: 2021-12-13 | Stop reason: SDUPTHER

## 2021-06-29 ENCOUNTER — ANESTHESIA EVENT (OUTPATIENT)
Dept: SURGERY | Facility: AMBULARY SURGERY CENTER | Age: 71
End: 2021-06-29
Payer: MEDICARE

## 2021-06-30 ENCOUNTER — ANESTHESIA (OUTPATIENT)
Dept: SURGERY | Facility: AMBULARY SURGERY CENTER | Age: 71
End: 2021-06-30
Payer: MEDICARE

## 2021-06-30 ENCOUNTER — HOSPITAL ENCOUNTER (OUTPATIENT)
Facility: AMBULARY SURGERY CENTER | Age: 71
Discharge: HOME | End: 2021-06-30
Attending: OPHTHALMOLOGY | Admitting: OPHTHALMOLOGY
Payer: MEDICARE

## 2021-06-30 DIAGNOSIS — H25.12 AGE-RELATED NUCLEAR CATARACT, LEFT: ICD-10-CM

## 2021-06-30 DIAGNOSIS — Z98.41 STATUS POST CATARACT SURGERY, RIGHT: ICD-10-CM

## 2021-06-30 PROCEDURE — D9220A PRA ANESTHESIA: Mod: ANES,,, | Performed by: ANESTHESIOLOGY

## 2021-06-30 PROCEDURE — 66984 PR REMOVAL, CATARACT, W/INSRT INTRAOC LENS, W/O ENDO CYCLO: ICD-10-PCS | Mod: 79,LT,, | Performed by: OPHTHALMOLOGY

## 2021-06-30 PROCEDURE — 66984 XCAPSL CTRC RMVL W/O ECP: CPT | Mod: 79,LT,, | Performed by: OPHTHALMOLOGY

## 2021-06-30 PROCEDURE — 66984 XCAPSL CTRC RMVL W/O ECP: CPT | Performed by: OPHTHALMOLOGY

## 2021-06-30 PROCEDURE — D9220A PRA ANESTHESIA: ICD-10-PCS | Mod: CRNA,,, | Performed by: NURSE ANESTHETIST, CERTIFIED REGISTERED

## 2021-06-30 PROCEDURE — D9220A PRA ANESTHESIA: ICD-10-PCS | Mod: ANES,,, | Performed by: ANESTHESIOLOGY

## 2021-06-30 PROCEDURE — D9220A PRA ANESTHESIA: Mod: CRNA,,, | Performed by: NURSE ANESTHETIST, CERTIFIED REGISTERED

## 2021-06-30 DEVICE — IMPLANTABLE DEVICE: Type: IMPLANTABLE DEVICE | Site: EYE | Status: FUNCTIONAL

## 2021-06-30 RX ORDER — LIDOCAINE HYDROCHLORIDE 10 MG/ML
0.5 INJECTION, SOLUTION EPIDURAL; INFILTRATION; INTRACAUDAL; PERINEURAL ONCE
Status: DISCONTINUED | OUTPATIENT
Start: 2021-06-30 | End: 2021-06-30 | Stop reason: HOSPADM

## 2021-06-30 RX ORDER — SODIUM CHLORIDE 0.9 % (FLUSH) 0.9 %
3 SYRINGE (ML) INJECTION EVERY 8 HOURS
Status: DISCONTINUED | OUTPATIENT
Start: 2021-06-30 | End: 2021-06-30 | Stop reason: HOSPADM

## 2021-06-30 RX ORDER — MIDAZOLAM HYDROCHLORIDE 1 MG/ML
INJECTION, SOLUTION INTRAMUSCULAR; INTRAVENOUS
Status: DISCONTINUED | OUTPATIENT
Start: 2021-06-30 | End: 2021-06-30

## 2021-06-30 RX ORDER — PHENYLEPHRINE HYDROCHLORIDE 25 MG/ML
1 SOLUTION/ DROPS OPHTHALMIC
Status: DISCONTINUED | OUTPATIENT
Start: 2021-06-30 | End: 2021-06-30 | Stop reason: HOSPADM

## 2021-06-30 RX ORDER — SODIUM CHLORIDE, SODIUM LACTATE, POTASSIUM CHLORIDE, CALCIUM CHLORIDE 600; 310; 30; 20 MG/100ML; MG/100ML; MG/100ML; MG/100ML
INJECTION, SOLUTION INTRAVENOUS CONTINUOUS
Status: DISCONTINUED | OUTPATIENT
Start: 2021-06-30 | End: 2021-06-30 | Stop reason: HOSPADM

## 2021-06-30 RX ORDER — TETRACAINE HYDROCHLORIDE 5 MG/ML
SOLUTION OPHTHALMIC
Status: DISCONTINUED | OUTPATIENT
Start: 2021-06-30 | End: 2021-06-30 | Stop reason: HOSPADM

## 2021-06-30 RX ORDER — PROPARACAINE HYDROCHLORIDE 5 MG/ML
1 SOLUTION/ DROPS OPHTHALMIC
Status: DISCONTINUED | OUTPATIENT
Start: 2021-06-30 | End: 2021-06-30 | Stop reason: HOSPADM

## 2021-06-30 RX ORDER — EPINEPHRINE 1 MG/ML
INJECTION, SOLUTION INTRACARDIAC; INTRAMUSCULAR; INTRAVENOUS; SUBCUTANEOUS
Status: DISCONTINUED | OUTPATIENT
Start: 2021-06-30 | End: 2021-06-30 | Stop reason: HOSPADM

## 2021-06-30 RX ORDER — ONDANSETRON 2 MG/ML
INJECTION INTRAMUSCULAR; INTRAVENOUS
Status: DISCONTINUED | OUTPATIENT
Start: 2021-06-30 | End: 2021-06-30

## 2021-06-30 RX ORDER — MOXIFLOXACIN 5 MG/ML
SOLUTION/ DROPS OPHTHALMIC
Status: DISCONTINUED | OUTPATIENT
Start: 2021-06-30 | End: 2021-06-30 | Stop reason: HOSPADM

## 2021-06-30 RX ORDER — LIDOCAINE HYDROCHLORIDE 40 MG/ML
INJECTION, SOLUTION RETROBULBAR
Status: DISCONTINUED | OUTPATIENT
Start: 2021-06-30 | End: 2021-06-30 | Stop reason: HOSPADM

## 2021-06-30 RX ORDER — SODIUM CHLORIDE 9 MG/ML
INJECTION, SOLUTION INTRAVENOUS CONTINUOUS
Status: DISCONTINUED | OUTPATIENT
Start: 2021-06-30 | End: 2021-06-30 | Stop reason: HOSPADM

## 2021-06-30 RX ORDER — TROPICAMIDE 10 MG/ML
1 SOLUTION/ DROPS OPHTHALMIC
Status: DISCONTINUED | OUTPATIENT
Start: 2021-06-30 | End: 2021-06-30 | Stop reason: HOSPADM

## 2021-06-30 RX ADMIN — TROPICAMIDE 1 DROP: 10 SOLUTION/ DROPS OPHTHALMIC at 08:06

## 2021-06-30 RX ADMIN — MIDAZOLAM HYDROCHLORIDE 2 MG: 1 INJECTION, SOLUTION INTRAMUSCULAR; INTRAVENOUS at 08:06

## 2021-06-30 RX ADMIN — PROPARACAINE HYDROCHLORIDE 1 DROP: 5 SOLUTION/ DROPS OPHTHALMIC at 08:06

## 2021-06-30 RX ADMIN — PHENYLEPHRINE HYDROCHLORIDE 1 DROP: 25 SOLUTION/ DROPS OPHTHALMIC at 08:06

## 2021-06-30 RX ADMIN — ONDANSETRON 4 MG: 2 INJECTION INTRAMUSCULAR; INTRAVENOUS at 08:06

## 2021-06-30 RX ADMIN — SODIUM CHLORIDE, SODIUM LACTATE, POTASSIUM CHLORIDE, CALCIUM CHLORIDE: 600; 310; 30; 20 INJECTION, SOLUTION INTRAVENOUS at 08:06

## 2021-07-01 ENCOUNTER — OFFICE VISIT (OUTPATIENT)
Dept: OPHTHALMOLOGY | Facility: CLINIC | Age: 71
End: 2021-07-01
Payer: MEDICARE

## 2021-07-01 VITALS
DIASTOLIC BLOOD PRESSURE: 70 MMHG | HEIGHT: 64 IN | WEIGHT: 189 LBS | OXYGEN SATURATION: 96 % | RESPIRATION RATE: 20 BRPM | HEART RATE: 76 BPM | TEMPERATURE: 98 F | SYSTOLIC BLOOD PRESSURE: 98 MMHG | BODY MASS INDEX: 32.27 KG/M2

## 2021-07-01 DIAGNOSIS — Z98.42 STATUS POST CATARACT SURGERY, LEFT: Primary | ICD-10-CM

## 2021-07-01 PROBLEM — H25.13 AGE-RELATED NUCLEAR CATARACT, BILATERAL: Status: RESOLVED | Noted: 2021-05-05 | Resolved: 2021-07-01

## 2021-07-01 PROBLEM — H26.9 CATARACT OF BOTH EYES: Status: RESOLVED | Noted: 2021-01-26 | Resolved: 2021-07-01

## 2021-07-01 PROBLEM — Z98.41 STATUS POST CATARACT SURGERY, RIGHT: Status: RESOLVED | Noted: 2021-06-30 | Resolved: 2021-07-01

## 2021-07-01 PROCEDURE — 99213 OFFICE O/P EST LOW 20 MIN: CPT | Mod: PBBFAC,PO | Performed by: OPHTHALMOLOGY

## 2021-07-01 PROCEDURE — 99024 PR POST-OP FOLLOW-UP VISIT: ICD-10-PCS | Mod: POP,,, | Performed by: OPHTHALMOLOGY

## 2021-07-01 PROCEDURE — 99024 POSTOP FOLLOW-UP VISIT: CPT | Mod: POP,,, | Performed by: OPHTHALMOLOGY

## 2021-07-01 PROCEDURE — 99999 PR PBB SHADOW E&M-EST. PATIENT-LVL III: ICD-10-PCS | Mod: PBBFAC,,, | Performed by: OPHTHALMOLOGY

## 2021-07-01 PROCEDURE — 99999 PR PBB SHADOW E&M-EST. PATIENT-LVL III: CPT | Mod: PBBFAC,,, | Performed by: OPHTHALMOLOGY

## 2021-07-08 ENCOUNTER — OFFICE VISIT (OUTPATIENT)
Dept: OPHTHALMOLOGY | Facility: CLINIC | Age: 71
End: 2021-07-08
Payer: MEDICARE

## 2021-07-08 DIAGNOSIS — Z98.42 STATUS POST CATARACT SURGERY, LEFT: Primary | ICD-10-CM

## 2021-07-08 DIAGNOSIS — Z98.41 STATUS POST CATARACT SURGERY, RIGHT: ICD-10-CM

## 2021-07-08 DIAGNOSIS — H43.391 VITREOUS FLOATERS OF RIGHT EYE: ICD-10-CM

## 2021-07-08 PROCEDURE — 99999 PR PBB SHADOW E&M-EST. PATIENT-LVL III: CPT | Mod: PBBFAC,,, | Performed by: OPHTHALMOLOGY

## 2021-07-08 PROCEDURE — 99213 OFFICE O/P EST LOW 20 MIN: CPT | Mod: PBBFAC,PO | Performed by: OPHTHALMOLOGY

## 2021-07-08 PROCEDURE — 99024 PR POST-OP FOLLOW-UP VISIT: ICD-10-PCS | Mod: POP,,, | Performed by: OPHTHALMOLOGY

## 2021-07-08 PROCEDURE — 99024 POSTOP FOLLOW-UP VISIT: CPT | Mod: POP,,, | Performed by: OPHTHALMOLOGY

## 2021-07-08 PROCEDURE — 99999 PR PBB SHADOW E&M-EST. PATIENT-LVL III: ICD-10-PCS | Mod: PBBFAC,,, | Performed by: OPHTHALMOLOGY

## 2021-07-12 ENCOUNTER — HOSPITAL ENCOUNTER (OUTPATIENT)
Dept: RADIOLOGY | Facility: HOSPITAL | Age: 71
Discharge: HOME | End: 2021-07-12
Attending: PHYSICIAN ASSISTANT
Payer: MEDICARE

## 2021-07-12 DIAGNOSIS — Z78.0 ASYMPTOMATIC MENOPAUSAL STATE: ICD-10-CM

## 2021-07-12 PROCEDURE — 77080 DXA BONE DENSITY AXIAL: CPT | Mod: 26,,, | Performed by: RADIOLOGY

## 2021-07-12 PROCEDURE — 77080 DEXA BONE DENSITY SPINE HIP: ICD-10-PCS | Mod: 26,,, | Performed by: RADIOLOGY

## 2021-07-12 PROCEDURE — 77080 DXA BONE DENSITY AXIAL: CPT | Mod: TC

## 2021-07-16 ENCOUNTER — TELEPHONE (OUTPATIENT)
Dept: FAMILY MEDICINE | Facility: CLINIC | Age: 71
End: 2021-07-16

## 2021-07-26 ENCOUNTER — OFFICE VISIT (OUTPATIENT)
Dept: URGENT CARE | Facility: CLINIC | Age: 71
End: 2021-07-26
Payer: MEDICARE

## 2021-07-26 VITALS
BODY MASS INDEX: 32.23 KG/M2 | OXYGEN SATURATION: 98 % | HEART RATE: 81 BPM | HEIGHT: 64 IN | DIASTOLIC BLOOD PRESSURE: 87 MMHG | TEMPERATURE: 98 F | SYSTOLIC BLOOD PRESSURE: 120 MMHG | WEIGHT: 188.81 LBS

## 2021-07-26 DIAGNOSIS — J01.90 ACUTE NON-RECURRENT SINUSITIS, UNSPECIFIED LOCATION: Primary | ICD-10-CM

## 2021-07-26 PROCEDURE — 99213 PR OFFICE/OUTPT VISIT, EST, LEVL III, 20-29 MIN: ICD-10-PCS | Mod: S$GLB,,, | Performed by: EMERGENCY MEDICINE

## 2021-07-26 PROCEDURE — 99213 OFFICE O/P EST LOW 20 MIN: CPT | Mod: S$GLB,,, | Performed by: EMERGENCY MEDICINE

## 2021-07-26 RX ORDER — LEVOFLOXACIN 500 MG/1
500 TABLET, FILM COATED ORAL DAILY
Qty: 10 TABLET | Refills: 0 | Status: SHIPPED | OUTPATIENT
Start: 2021-07-26 | End: 2021-08-05

## 2021-07-26 RX ORDER — CETIRIZINE HYDROCHLORIDE 10 MG/1
10 TABLET ORAL DAILY
Qty: 30 TABLET | Refills: 11 | Status: SHIPPED | OUTPATIENT
Start: 2021-07-26 | End: 2022-07-26

## 2021-07-29 ENCOUNTER — OFFICE VISIT (OUTPATIENT)
Dept: OPHTHALMOLOGY | Facility: CLINIC | Age: 71
End: 2021-07-29
Payer: MEDICARE

## 2021-07-29 DIAGNOSIS — H40.053 OCULAR HYPERTENSION, BILATERAL: ICD-10-CM

## 2021-07-29 DIAGNOSIS — Z98.42 STATUS POST CATARACT SURGERY, LEFT: Primary | ICD-10-CM

## 2021-07-29 PROCEDURE — 99213 OFFICE O/P EST LOW 20 MIN: CPT | Mod: PBBFAC,PO | Performed by: OPHTHALMOLOGY

## 2021-07-29 PROCEDURE — 99999 PR PBB SHADOW E&M-EST. PATIENT-LVL III: ICD-10-PCS | Mod: PBBFAC,,, | Performed by: OPHTHALMOLOGY

## 2021-07-29 PROCEDURE — 99999 PR PBB SHADOW E&M-EST. PATIENT-LVL III: CPT | Mod: PBBFAC,,, | Performed by: OPHTHALMOLOGY

## 2021-07-29 PROCEDURE — 99024 POSTOP FOLLOW-UP VISIT: CPT | Mod: POP,,, | Performed by: OPHTHALMOLOGY

## 2021-07-29 PROCEDURE — 99024 PR POST-OP FOLLOW-UP VISIT: ICD-10-PCS | Mod: POP,,, | Performed by: OPHTHALMOLOGY

## 2021-08-04 ENCOUNTER — PATIENT OUTREACH (OUTPATIENT)
Dept: ADMINISTRATIVE | Facility: OTHER | Age: 71
End: 2021-08-04

## 2021-08-04 ENCOUNTER — TELEPHONE (OUTPATIENT)
Dept: OPHTHALMOLOGY | Facility: CLINIC | Age: 71
End: 2021-08-04

## 2021-08-05 ENCOUNTER — OFFICE VISIT (OUTPATIENT)
Dept: OPHTHALMOLOGY | Facility: CLINIC | Age: 71
End: 2021-08-05
Payer: MEDICARE

## 2021-08-05 DIAGNOSIS — H53.19 PHOTOPSIA OF LEFT EYE: Primary | ICD-10-CM

## 2021-08-05 PROCEDURE — 99213 PR OFFICE/OUTPT VISIT, EST, LEVL III, 20-29 MIN: ICD-10-PCS | Mod: 24,S$PBB,, | Performed by: OPHTHALMOLOGY

## 2021-08-05 PROCEDURE — 99999 PR PBB SHADOW E&M-EST. PATIENT-LVL III: ICD-10-PCS | Mod: PBBFAC,,, | Performed by: OPHTHALMOLOGY

## 2021-08-05 PROCEDURE — 99213 OFFICE O/P EST LOW 20 MIN: CPT | Mod: PBBFAC,PO | Performed by: OPHTHALMOLOGY

## 2021-08-05 PROCEDURE — 99213 OFFICE O/P EST LOW 20 MIN: CPT | Mod: 24,S$PBB,, | Performed by: OPHTHALMOLOGY

## 2021-08-05 PROCEDURE — 99999 PR PBB SHADOW E&M-EST. PATIENT-LVL III: CPT | Mod: PBBFAC,,, | Performed by: OPHTHALMOLOGY

## 2021-10-25 ENCOUNTER — PATIENT OUTREACH (OUTPATIENT)
Dept: ADMINISTRATIVE | Facility: OTHER | Age: 71
End: 2021-10-25
Payer: MEDICARE

## 2021-10-26 ENCOUNTER — OFFICE VISIT (OUTPATIENT)
Dept: OPHTHALMOLOGY | Facility: CLINIC | Age: 71
End: 2021-10-26
Payer: MEDICARE

## 2021-10-26 DIAGNOSIS — H04.123 DRY EYE SYNDROME, BILATERAL: ICD-10-CM

## 2021-10-26 DIAGNOSIS — H40.053 OCULAR HYPERTENSION, BILATERAL: Primary | ICD-10-CM

## 2021-10-26 DIAGNOSIS — Z96.1 PSEUDOPHAKIA, BOTH EYES: ICD-10-CM

## 2021-10-26 PROCEDURE — 99999 PR PBB SHADOW E&M-EST. PATIENT-LVL II: CPT | Mod: PBBFAC,,, | Performed by: OPHTHALMOLOGY

## 2021-10-26 PROCEDURE — 99213 PR OFFICE/OUTPT VISIT, EST, LEVL III, 20-29 MIN: ICD-10-PCS | Mod: S$PBB,,, | Performed by: OPHTHALMOLOGY

## 2021-10-26 PROCEDURE — 99213 OFFICE O/P EST LOW 20 MIN: CPT | Mod: S$PBB,,, | Performed by: OPHTHALMOLOGY

## 2021-10-26 PROCEDURE — 99212 OFFICE O/P EST SF 10 MIN: CPT | Mod: PBBFAC,PO | Performed by: OPHTHALMOLOGY

## 2021-10-26 PROCEDURE — 99999 PR PBB SHADOW E&M-EST. PATIENT-LVL II: ICD-10-PCS | Mod: PBBFAC,,, | Performed by: OPHTHALMOLOGY

## 2021-11-11 ENCOUNTER — OFFICE VISIT (OUTPATIENT)
Dept: DERMATOLOGY | Facility: CLINIC | Age: 71
End: 2021-11-11
Payer: MEDICARE

## 2021-11-11 DIAGNOSIS — D48.5 NEOPLASM OF UNCERTAIN BEHAVIOR OF SKIN: Primary | ICD-10-CM

## 2021-11-11 DIAGNOSIS — L82.1 SEBORRHEIC KERATOSES: ICD-10-CM

## 2021-11-11 PROCEDURE — 99999 PR PBB SHADOW E&M-EST. PATIENT-LVL II: ICD-10-PCS | Mod: PBBFAC,,, | Performed by: DERMATOLOGY

## 2021-11-11 PROCEDURE — 67810 INCAL BX EYELID SKN LID MRGN: CPT | Mod: E4,PBBFAC,PO | Performed by: DERMATOLOGY

## 2021-11-11 PROCEDURE — 88305 TISSUE EXAM BY PATHOLOGIST: CPT | Performed by: PATHOLOGY

## 2021-11-11 PROCEDURE — 99999 PR PBB SHADOW E&M-EST. PATIENT-LVL II: CPT | Mod: PBBFAC,,, | Performed by: DERMATOLOGY

## 2021-11-11 PROCEDURE — 99212 OFFICE O/P EST SF 10 MIN: CPT | Mod: PBBFAC,PO | Performed by: DERMATOLOGY

## 2021-11-11 PROCEDURE — 67810 PR BIOPSY OF EYELID: ICD-10-PCS | Mod: S$PBB,E4,, | Performed by: DERMATOLOGY

## 2021-11-11 PROCEDURE — 88305 TISSUE EXAM BY PATHOLOGIST: ICD-10-PCS | Mod: 26,,, | Performed by: PATHOLOGY

## 2021-11-11 PROCEDURE — 99203 OFFICE O/P NEW LOW 30 MIN: CPT | Mod: 25,S$PBB,, | Performed by: DERMATOLOGY

## 2021-11-11 PROCEDURE — 88305 TISSUE EXAM BY PATHOLOGIST: CPT | Mod: 26,,, | Performed by: PATHOLOGY

## 2021-11-11 PROCEDURE — 99203 PR OFFICE/OUTPT VISIT, NEW, LEVL III, 30-44 MIN: ICD-10-PCS | Mod: 25,S$PBB,, | Performed by: DERMATOLOGY

## 2021-11-11 PROCEDURE — 67810 INCAL BX EYELID SKN LID MRGN: CPT | Mod: S$PBB,E4,, | Performed by: DERMATOLOGY

## 2021-11-16 LAB
FINAL PATHOLOGIC DIAGNOSIS: NORMAL
GROSS: NORMAL
Lab: NORMAL
MICROSCOPIC EXAM: NORMAL

## 2021-12-06 ENCOUNTER — LAB VISIT (OUTPATIENT)
Dept: LAB | Facility: HOSPITAL | Age: 71
End: 2021-12-06
Attending: PHYSICIAN ASSISTANT
Payer: MEDICARE

## 2021-12-06 DIAGNOSIS — E78.5 HYPERLIPIDEMIA, UNSPECIFIED HYPERLIPIDEMIA TYPE: ICD-10-CM

## 2021-12-06 LAB
ALBUMIN SERPL BCP-MCNC: 3.8 G/DL (ref 3.5–5.2)
ALP SERPL-CCNC: 83 U/L (ref 55–135)
ALT SERPL W/O P-5'-P-CCNC: 19 U/L (ref 10–44)
ANION GAP SERPL CALC-SCNC: 10 MMOL/L (ref 8–16)
AST SERPL-CCNC: 20 U/L (ref 10–40)
BASOPHILS # BLD AUTO: 0.05 K/UL (ref 0–0.2)
BASOPHILS NFR BLD: 0.7 % (ref 0–1.9)
BILIRUB SERPL-MCNC: 0.6 MG/DL (ref 0.1–1)
BUN SERPL-MCNC: 13 MG/DL (ref 8–23)
CALCIUM SERPL-MCNC: 9.7 MG/DL (ref 8.7–10.5)
CHLORIDE SERPL-SCNC: 108 MMOL/L (ref 95–110)
CHOLEST SERPL-MCNC: 143 MG/DL (ref 120–199)
CHOLEST/HDLC SERPL: 4.2 {RATIO} (ref 2–5)
CO2 SERPL-SCNC: 24 MMOL/L (ref 23–29)
CREAT SERPL-MCNC: 0.9 MG/DL (ref 0.5–1.4)
DIFFERENTIAL METHOD: NORMAL
EOSINOPHIL # BLD AUTO: 0.2 K/UL (ref 0–0.5)
EOSINOPHIL NFR BLD: 2.9 % (ref 0–8)
ERYTHROCYTE [DISTWIDTH] IN BLOOD BY AUTOMATED COUNT: 13.1 % (ref 11.5–14.5)
EST. GFR  (AFRICAN AMERICAN): >60 ML/MIN/1.73 M^2
EST. GFR  (NON AFRICAN AMERICAN): >60 ML/MIN/1.73 M^2
GLUCOSE SERPL-MCNC: 97 MG/DL (ref 70–110)
HCT VFR BLD AUTO: 39.4 % (ref 37–48.5)
HDLC SERPL-MCNC: 34 MG/DL (ref 40–75)
HDLC SERPL: 23.8 % (ref 20–50)
HGB BLD-MCNC: 12.6 G/DL (ref 12–16)
IMM GRANULOCYTES # BLD AUTO: 0.01 K/UL (ref 0–0.04)
IMM GRANULOCYTES NFR BLD AUTO: 0.1 % (ref 0–0.5)
LDLC SERPL CALC-MCNC: 64.8 MG/DL (ref 63–159)
LYMPHOCYTES # BLD AUTO: 3.2 K/UL (ref 1–4.8)
LYMPHOCYTES NFR BLD: 43.8 % (ref 18–48)
MCH RBC QN AUTO: 29.4 PG (ref 27–31)
MCHC RBC AUTO-ENTMCNC: 32 G/DL (ref 32–36)
MCV RBC AUTO: 92 FL (ref 82–98)
MONOCYTES # BLD AUTO: 0.6 K/UL (ref 0.3–1)
MONOCYTES NFR BLD: 8.3 % (ref 4–15)
NEUTROPHILS # BLD AUTO: 3.3 K/UL (ref 1.8–7.7)
NEUTROPHILS NFR BLD: 44.2 % (ref 38–73)
NONHDLC SERPL-MCNC: 109 MG/DL
NRBC BLD-RTO: 0 /100 WBC
PLATELET # BLD AUTO: 257 K/UL (ref 150–450)
PMV BLD AUTO: 9.8 FL (ref 9.2–12.9)
POTASSIUM SERPL-SCNC: 4.5 MMOL/L (ref 3.5–5.1)
PROT SERPL-MCNC: 7.3 G/DL (ref 6–8.4)
RBC # BLD AUTO: 4.29 M/UL (ref 4–5.4)
SODIUM SERPL-SCNC: 142 MMOL/L (ref 136–145)
TRIGL SERPL-MCNC: 221 MG/DL (ref 30–150)
WBC # BLD AUTO: 7.36 K/UL (ref 3.9–12.7)

## 2021-12-06 PROCEDURE — 85025 COMPLETE CBC W/AUTO DIFF WBC: CPT | Performed by: PHYSICIAN ASSISTANT

## 2021-12-06 PROCEDURE — 80053 COMPREHEN METABOLIC PANEL: CPT | Performed by: PHYSICIAN ASSISTANT

## 2021-12-06 PROCEDURE — 36415 COLL VENOUS BLD VENIPUNCTURE: CPT | Performed by: PHYSICIAN ASSISTANT

## 2021-12-06 PROCEDURE — 80061 LIPID PANEL: CPT | Performed by: PHYSICIAN ASSISTANT

## 2021-12-13 ENCOUNTER — OFFICE VISIT (OUTPATIENT)
Dept: FAMILY MEDICINE | Facility: CLINIC | Age: 71
End: 2021-12-13
Payer: MEDICARE

## 2021-12-13 VITALS
BODY MASS INDEX: 32.05 KG/M2 | TEMPERATURE: 98 F | SYSTOLIC BLOOD PRESSURE: 122 MMHG | WEIGHT: 187.75 LBS | OXYGEN SATURATION: 98 % | DIASTOLIC BLOOD PRESSURE: 88 MMHG | HEART RATE: 85 BPM | HEIGHT: 64 IN

## 2021-12-13 DIAGNOSIS — Z12.31 ENCOUNTER FOR SCREENING MAMMOGRAM FOR MALIGNANT NEOPLASM OF BREAST: ICD-10-CM

## 2021-12-13 DIAGNOSIS — E78.5 HYPERLIPIDEMIA, UNSPECIFIED HYPERLIPIDEMIA TYPE: ICD-10-CM

## 2021-12-13 DIAGNOSIS — G47.09 OTHER INSOMNIA: ICD-10-CM

## 2021-12-13 DIAGNOSIS — J45.990 EXERCISE-INDUCED ASTHMA: ICD-10-CM

## 2021-12-13 DIAGNOSIS — J30.1 SEASONAL ALLERGIC RHINITIS DUE TO POLLEN: ICD-10-CM

## 2021-12-13 DIAGNOSIS — I10 GOOD HYPERTENSION CONTROL: Primary | ICD-10-CM

## 2021-12-13 PROCEDURE — 99213 PR OFFICE/OUTPT VISIT, EST, LEVL III, 20-29 MIN: ICD-10-PCS | Mod: S$PBB,,, | Performed by: PHYSICIAN ASSISTANT

## 2021-12-13 PROCEDURE — 99999 PR PBB SHADOW E&M-EST. PATIENT-LVL III: CPT | Mod: PBBFAC,,, | Performed by: PHYSICIAN ASSISTANT

## 2021-12-13 PROCEDURE — 99999 PR PBB SHADOW E&M-EST. PATIENT-LVL III: ICD-10-PCS | Mod: PBBFAC,,, | Performed by: PHYSICIAN ASSISTANT

## 2021-12-13 PROCEDURE — 99213 OFFICE O/P EST LOW 20 MIN: CPT | Mod: S$PBB,,, | Performed by: PHYSICIAN ASSISTANT

## 2021-12-13 PROCEDURE — 99213 OFFICE O/P EST LOW 20 MIN: CPT | Mod: PBBFAC,PN | Performed by: PHYSICIAN ASSISTANT

## 2021-12-13 RX ORDER — TRAZODONE HYDROCHLORIDE 50 MG/1
50 TABLET ORAL NIGHTLY
Qty: 90 TABLET | Refills: 3 | Status: SHIPPED | OUTPATIENT
Start: 2021-12-13 | End: 2022-12-13

## 2021-12-13 RX ORDER — ROSUVASTATIN CALCIUM 40 MG/1
40 TABLET, COATED ORAL NIGHTLY
Qty: 90 TABLET | Refills: 3 | Status: SHIPPED | OUTPATIENT
Start: 2021-12-13 | End: 2023-05-09

## 2021-12-13 RX ORDER — FENOFIBRATE 145 MG/1
145 TABLET, FILM COATED ORAL DAILY
Qty: 90 TABLET | Refills: 3 | Status: SHIPPED | OUTPATIENT
Start: 2021-12-13 | End: 2022-12-13

## 2021-12-13 RX ORDER — MONTELUKAST SODIUM 10 MG/1
10 TABLET ORAL DAILY
Qty: 90 TABLET | Refills: 3 | Status: SHIPPED | OUTPATIENT
Start: 2021-12-13

## 2021-12-13 RX ORDER — ALBUTEROL SULFATE 90 UG/1
2 AEROSOL, METERED RESPIRATORY (INHALATION) EVERY 6 HOURS PRN
Qty: 18 G | Refills: 3 | Status: SHIPPED | OUTPATIENT
Start: 2021-12-13

## 2021-12-14 ENCOUNTER — TELEPHONE (OUTPATIENT)
Dept: FAMILY MEDICINE | Facility: CLINIC | Age: 71
End: 2021-12-14
Payer: MEDICARE

## 2022-02-23 ENCOUNTER — HOSPITAL ENCOUNTER (OUTPATIENT)
Dept: RADIOLOGY | Facility: HOSPITAL | Age: 72
Discharge: HOME | End: 2022-02-23
Attending: PHYSICIAN ASSISTANT
Payer: MEDICARE

## 2022-02-23 DIAGNOSIS — Z12.31 ENCOUNTER FOR SCREENING MAMMOGRAM FOR MALIGNANT NEOPLASM OF BREAST: ICD-10-CM

## 2022-02-23 PROCEDURE — 77063 MAMMO DIGITAL SCREENING BILAT WITH TOMO: ICD-10-PCS | Mod: 26,,, | Performed by: RADIOLOGY

## 2022-02-23 PROCEDURE — 77067 MAMMO DIGITAL SCREENING BILAT WITH TOMO: ICD-10-PCS | Mod: 26,,, | Performed by: RADIOLOGY

## 2022-02-23 PROCEDURE — 77067 SCR MAMMO BI INCL CAD: CPT | Mod: 26,,, | Performed by: RADIOLOGY

## 2022-02-23 PROCEDURE — 77063 BREAST TOMOSYNTHESIS BI: CPT | Mod: 26,,, | Performed by: RADIOLOGY

## 2022-02-23 PROCEDURE — 77067 SCR MAMMO BI INCL CAD: CPT | Mod: TC

## 2022-03-18 ENCOUNTER — TELEPHONE (OUTPATIENT)
Dept: FAMILY MEDICINE | Facility: CLINIC | Age: 72
End: 2022-03-18
Payer: MEDICARE

## 2022-03-21 ENCOUNTER — TELEPHONE (OUTPATIENT)
Dept: FAMILY MEDICINE | Facility: CLINIC | Age: 72
End: 2022-03-21
Payer: MEDICARE

## 2022-03-21 NOTE — TELEPHONE ENCOUNTER
Called patient to reschedule her appointment for today to another day but there was no answer, left a message for patient to return call to the clinic to reschedule her appointment.

## 2022-04-26 ENCOUNTER — OFFICE VISIT (OUTPATIENT)
Dept: OPHTHALMOLOGY | Facility: CLINIC | Age: 72
End: 2022-04-26
Payer: MEDICARE

## 2022-04-26 DIAGNOSIS — H04.123 DRY EYE SYNDROME, BILATERAL: ICD-10-CM

## 2022-04-26 DIAGNOSIS — H40.053 OCULAR HYPERTENSION, BILATERAL: Primary | ICD-10-CM

## 2022-04-26 DIAGNOSIS — Z96.1 PSEUDOPHAKIA, BOTH EYES: ICD-10-CM

## 2022-04-26 PROCEDURE — 92014 COMPRE OPH EXAM EST PT 1/>: CPT | Mod: S$PBB,,, | Performed by: OPHTHALMOLOGY

## 2022-04-26 PROCEDURE — 92133 CPTRZD OPH DX IMG PST SGM ON: CPT | Mod: PBBFAC,PO | Performed by: OPHTHALMOLOGY

## 2022-04-26 PROCEDURE — 92133 POSTERIOR SEGMENT OCT OPTIC NERVE(OCULAR COHERENCE TOMOGRAPHY) - OU - BOTH EYES: ICD-10-PCS | Mod: 26,S$PBB,, | Performed by: OPHTHALMOLOGY

## 2022-04-26 PROCEDURE — 92014 PR EYE EXAM, EST PATIENT,COMPREHESV: ICD-10-PCS | Mod: S$PBB,,, | Performed by: OPHTHALMOLOGY

## 2022-04-26 PROCEDURE — 99999 PR PBB SHADOW E&M-EST. PATIENT-LVL III: CPT | Mod: PBBFAC,,, | Performed by: OPHTHALMOLOGY

## 2022-04-26 PROCEDURE — 99999 PR PBB SHADOW E&M-EST. PATIENT-LVL III: ICD-10-PCS | Mod: PBBFAC,,, | Performed by: OPHTHALMOLOGY

## 2022-04-26 PROCEDURE — 99213 OFFICE O/P EST LOW 20 MIN: CPT | Mod: PBBFAC,PO | Performed by: OPHTHALMOLOGY

## 2022-04-26 RX ORDER — OXYBUTYNIN CHLORIDE 5 MG/1
1 TABLET, EXTENDED RELEASE ORAL DAILY
COMMUNITY
Start: 2022-03-24 | End: 2023-03-24

## 2022-04-26 RX ORDER — ESZOPICLONE 3 MG/1
3 TABLET, FILM COATED ORAL
COMMUNITY
Start: 2022-03-29

## 2022-04-26 RX ORDER — GEMFIBROZIL 600 MG/1
600 TABLET, FILM COATED ORAL
COMMUNITY
Start: 2022-03-24 | End: 2023-03-24

## 2022-04-26 NOTE — PROGRESS NOTES
HPI     Ocular Hypertension      Additional comments: No gtts.               Comments     DLS: 10/26/2021- 6 m IOP ck DFE OCT N     Pt states all is well. Denies FOL/ floaters. Pain OD on occasion.               Last edited by Orin Matute on 4/26/2022  1:13 PM. (History)            Assessment /Plan     For exam results, see Encounter Report.    Ocular hypertension, bilateral    Dry eye syndrome, bilateral    Pseudophakia, both eyes      1. Ocular hypertension, bilateral  Very thick pachy  Neg famhx    OCT NFL nearly normal, stable  Optic nerve heads appear normal  Tmax 27/27 (on latanoprost)  s/p CEIOL OU    IOP is normal, off latanoprost    Low risk, monitor off meds  F/u 1 year, dilate, OCT NFL    2. Dry eye syndrome, bilateral  Increase artificial tears    3. Pseudophakia, both eyes  Doing well, OTC readers only

## 2022-07-30 ENCOUNTER — ANESTHESIA (OUTPATIENT)
Dept: ENDOSCOPY | Facility: HOSPITAL | Age: 72
DRG: 378 | End: 2022-07-30
Payer: MEDICARE

## 2022-07-30 ENCOUNTER — HOSPITAL ENCOUNTER (INPATIENT)
Facility: HOSPITAL | Age: 72
LOS: 1 days | Discharge: HOME | DRG: 378 | End: 2022-08-01
Attending: EMERGENCY MEDICINE | Admitting: INTERNAL MEDICINE
Payer: MEDICARE

## 2022-07-30 ENCOUNTER — ANESTHESIA EVENT (OUTPATIENT)
Dept: ENDOSCOPY | Facility: HOSPITAL | Age: 72
DRG: 378 | End: 2022-07-30
Payer: MEDICARE

## 2022-07-30 DIAGNOSIS — D62 ACUTE BLOOD LOSS ANEMIA: ICD-10-CM

## 2022-07-30 DIAGNOSIS — K92.1 HEMATOCHEZIA: ICD-10-CM

## 2022-07-30 DIAGNOSIS — K92.2 GIB (GASTROINTESTINAL BLEEDING): ICD-10-CM

## 2022-07-30 DIAGNOSIS — K92.2 GASTROINTESTINAL HEMORRHAGE, UNSPECIFIED GASTROINTESTINAL HEMORRHAGE TYPE: Primary | ICD-10-CM

## 2022-07-30 PROBLEM — R73.03 PREDIABETES: Status: ACTIVE | Noted: 2022-06-21

## 2022-07-30 PROBLEM — R01.1 HEART MURMUR: Status: ACTIVE | Noted: 2022-06-23

## 2022-07-30 PROBLEM — K21.9 GASTROESOPHAGEAL REFLUX DISEASE WITHOUT ESOPHAGITIS: Status: ACTIVE | Noted: 2022-03-24

## 2022-07-30 PROBLEM — N32.81 OVERACTIVE BLADDER: Status: ACTIVE | Noted: 2022-03-24

## 2022-07-30 PROBLEM — F51.01 PRIMARY INSOMNIA: Status: ACTIVE | Noted: 2022-03-24

## 2022-07-30 LAB
ABO + RH BLD: NORMAL
ALBUMIN SERPL BCP-MCNC: 3.2 G/DL (ref 3.5–5.2)
ALP SERPL-CCNC: 74 U/L (ref 55–135)
ALT SERPL W/O P-5'-P-CCNC: 14 U/L (ref 10–44)
ANION GAP SERPL CALC-SCNC: 11 MMOL/L (ref 8–16)
AST SERPL-CCNC: 14 U/L (ref 10–40)
BASOPHILS # BLD AUTO: 0.03 K/UL (ref 0–0.2)
BASOPHILS # BLD AUTO: 0.03 K/UL (ref 0–0.2)
BASOPHILS # BLD AUTO: 0.04 K/UL (ref 0–0.2)
BASOPHILS # BLD AUTO: 0.04 K/UL (ref 0–0.2)
BASOPHILS NFR BLD: 0.3 % (ref 0–1.9)
BASOPHILS NFR BLD: 0.3 % (ref 0–1.9)
BASOPHILS NFR BLD: 0.4 % (ref 0–1.9)
BASOPHILS NFR BLD: 0.4 % (ref 0–1.9)
BILIRUB SERPL-MCNC: 0.4 MG/DL (ref 0.1–1)
BLD GP AB SCN CELLS X3 SERPL QL: NORMAL
BLD PROD TYP BPU: NORMAL
BLOOD UNIT EXPIRATION DATE: NORMAL
BLOOD UNIT TYPE CODE: 5100
BLOOD UNIT TYPE: NORMAL
BUN SERPL-MCNC: 27 MG/DL (ref 8–23)
CALCIUM SERPL-MCNC: 8.7 MG/DL (ref 8.7–10.5)
CHLORIDE SERPL-SCNC: 109 MMOL/L (ref 95–110)
CO2 SERPL-SCNC: 21 MMOL/L (ref 23–29)
CODING SYSTEM: NORMAL
CREAT SERPL-MCNC: 1.1 MG/DL (ref 0.5–1.4)
DIFFERENTIAL METHOD: ABNORMAL
DISPENSE STATUS: NORMAL
EOSINOPHIL # BLD AUTO: 0 K/UL (ref 0–0.5)
EOSINOPHIL # BLD AUTO: 0.1 K/UL (ref 0–0.5)
EOSINOPHIL # BLD AUTO: 0.1 K/UL (ref 0–0.5)
EOSINOPHIL # BLD AUTO: 0.2 K/UL (ref 0–0.5)
EOSINOPHIL NFR BLD: 0.3 % (ref 0–8)
EOSINOPHIL NFR BLD: 1.1 % (ref 0–8)
EOSINOPHIL NFR BLD: 1.2 % (ref 0–8)
EOSINOPHIL NFR BLD: 1.8 % (ref 0–8)
ERYTHROCYTE [DISTWIDTH] IN BLOOD BY AUTOMATED COUNT: 12.6 % (ref 11.5–14.5)
ERYTHROCYTE [DISTWIDTH] IN BLOOD BY AUTOMATED COUNT: 12.7 % (ref 11.5–14.5)
ERYTHROCYTE [DISTWIDTH] IN BLOOD BY AUTOMATED COUNT: 13.2 % (ref 11.5–14.5)
ERYTHROCYTE [DISTWIDTH] IN BLOOD BY AUTOMATED COUNT: 13.2 % (ref 11.5–14.5)
EST. GFR  (AFRICAN AMERICAN): 59 ML/MIN/1.73 M^2
EST. GFR  (NON AFRICAN AMERICAN): 51 ML/MIN/1.73 M^2
FOLATE SERPL-MCNC: 7.4 NG/ML (ref 4–24)
GLUCOSE SERPL-MCNC: 180 MG/DL (ref 70–110)
HCT VFR BLD AUTO: 23.5 % (ref 37–48.5)
HCT VFR BLD AUTO: 27.5 % (ref 37–48.5)
HCT VFR BLD AUTO: 28.1 % (ref 37–48.5)
HCT VFR BLD AUTO: 29.2 % (ref 37–48.5)
HGB BLD-MCNC: 7.8 G/DL (ref 12–16)
HGB BLD-MCNC: 9.1 G/DL (ref 12–16)
HGB BLD-MCNC: 9.3 G/DL (ref 12–16)
HGB BLD-MCNC: 9.9 G/DL (ref 12–16)
IMM GRANULOCYTES # BLD AUTO: 0.02 K/UL (ref 0–0.04)
IMM GRANULOCYTES # BLD AUTO: 0.03 K/UL (ref 0–0.04)
IMM GRANULOCYTES # BLD AUTO: 0.04 K/UL (ref 0–0.04)
IMM GRANULOCYTES # BLD AUTO: 0.04 K/UL (ref 0–0.04)
IMM GRANULOCYTES NFR BLD AUTO: 0.2 % (ref 0–0.5)
IMM GRANULOCYTES NFR BLD AUTO: 0.3 % (ref 0–0.5)
IMM GRANULOCYTES NFR BLD AUTO: 0.4 % (ref 0–0.5)
IMM GRANULOCYTES NFR BLD AUTO: 0.4 % (ref 0–0.5)
IRON SERPL-MCNC: 93 UG/DL (ref 30–160)
LIPASE SERPL-CCNC: 52 U/L (ref 4–60)
LYMPHOCYTES # BLD AUTO: 2.4 K/UL (ref 1–4.8)
LYMPHOCYTES # BLD AUTO: 3.4 K/UL (ref 1–4.8)
LYMPHOCYTES # BLD AUTO: 3.5 K/UL (ref 1–4.8)
LYMPHOCYTES # BLD AUTO: 3.8 K/UL (ref 1–4.8)
LYMPHOCYTES NFR BLD: 27.2 % (ref 18–48)
LYMPHOCYTES NFR BLD: 37.3 % (ref 18–48)
LYMPHOCYTES NFR BLD: 38.7 % (ref 18–48)
LYMPHOCYTES NFR BLD: 39 % (ref 18–48)
MCH RBC QN AUTO: 30.1 PG (ref 27–31)
MCH RBC QN AUTO: 30.2 PG (ref 27–31)
MCH RBC QN AUTO: 30.5 PG (ref 27–31)
MCH RBC QN AUTO: 30.9 PG (ref 27–31)
MCHC RBC AUTO-ENTMCNC: 32.4 G/DL (ref 32–36)
MCHC RBC AUTO-ENTMCNC: 33.2 G/DL (ref 32–36)
MCHC RBC AUTO-ENTMCNC: 33.8 G/DL (ref 32–36)
MCHC RBC AUTO-ENTMCNC: 33.9 G/DL (ref 32–36)
MCV RBC AUTO: 89 FL (ref 82–98)
MCV RBC AUTO: 91 FL (ref 82–98)
MCV RBC AUTO: 92 FL (ref 82–98)
MCV RBC AUTO: 93 FL (ref 82–98)
MONOCYTES # BLD AUTO: 0.6 K/UL (ref 0.3–1)
MONOCYTES # BLD AUTO: 0.6 K/UL (ref 0.3–1)
MONOCYTES # BLD AUTO: 0.7 K/UL (ref 0.3–1)
MONOCYTES # BLD AUTO: 0.7 K/UL (ref 0.3–1)
MONOCYTES NFR BLD: 6.4 % (ref 4–15)
MONOCYTES NFR BLD: 6.8 % (ref 4–15)
MONOCYTES NFR BLD: 7.4 % (ref 4–15)
MONOCYTES NFR BLD: 8.2 % (ref 4–15)
NEUTROPHILS # BLD AUTO: 4.5 K/UL (ref 1.8–7.7)
NEUTROPHILS # BLD AUTO: 4.9 K/UL (ref 1.8–7.7)
NEUTROPHILS # BLD AUTO: 5.1 K/UL (ref 1.8–7.7)
NEUTROPHILS # BLD AUTO: 5.7 K/UL (ref 1.8–7.7)
NEUTROPHILS NFR BLD: 50.3 % (ref 38–73)
NEUTROPHILS NFR BLD: 51.9 % (ref 38–73)
NEUTROPHILS NFR BLD: 54.5 % (ref 38–73)
NEUTROPHILS NFR BLD: 65.2 % (ref 38–73)
NRBC BLD-RTO: 0 /100 WBC
NUM UNITS TRANS PACKED RBC: NORMAL
PLATELET # BLD AUTO: 246 K/UL (ref 150–450)
PLATELET # BLD AUTO: 247 K/UL (ref 150–450)
PLATELET # BLD AUTO: 250 K/UL (ref 150–450)
PLATELET # BLD AUTO: 318 K/UL (ref 150–450)
PMV BLD AUTO: 10 FL (ref 9.2–12.9)
PMV BLD AUTO: 9.7 FL (ref 9.2–12.9)
PMV BLD AUTO: 9.9 FL (ref 9.2–12.9)
PMV BLD AUTO: 9.9 FL (ref 9.2–12.9)
POCT GLUCOSE: 127 MG/DL (ref 70–110)
POCT GLUCOSE: 148 MG/DL (ref 70–110)
POCT GLUCOSE: 150 MG/DL (ref 70–110)
POCT GLUCOSE: 92 MG/DL (ref 70–110)
POTASSIUM SERPL-SCNC: 4.1 MMOL/L (ref 3.5–5.1)
PROT SERPL-MCNC: 6.5 G/DL (ref 6–8.4)
RBC # BLD AUTO: 2.56 M/UL (ref 4–5.4)
RBC # BLD AUTO: 3.01 M/UL (ref 4–5.4)
RBC # BLD AUTO: 3.02 M/UL (ref 4–5.4)
RBC # BLD AUTO: 3.28 M/UL (ref 4–5.4)
SARS-COV-2 RDRP RESP QL NAA+PROBE: NEGATIVE
SATURATED IRON: 29 % (ref 20–50)
SODIUM SERPL-SCNC: 141 MMOL/L (ref 136–145)
TOTAL IRON BINDING CAPACITY: 320 UG/DL (ref 250–450)
TRANSFERRIN SERPL-MCNC: 216 MG/DL (ref 200–375)
VIT B12 SERPL-MCNC: 291 PG/ML (ref 210–950)
WBC # BLD AUTO: 8.79 K/UL (ref 3.9–12.7)
WBC # BLD AUTO: 9 K/UL (ref 3.9–12.7)
WBC # BLD AUTO: 9.06 K/UL (ref 3.9–12.7)
WBC # BLD AUTO: 9.82 K/UL (ref 3.9–12.7)

## 2022-07-30 PROCEDURE — 82607 VITAMIN B-12: CPT | Performed by: NURSE PRACTITIONER

## 2022-07-30 PROCEDURE — 36415 COLL VENOUS BLD VENIPUNCTURE: CPT | Performed by: INTERNAL MEDICINE

## 2022-07-30 PROCEDURE — 88305 TISSUE EXAM BY PATHOLOGIST: CPT | Performed by: PATHOLOGY

## 2022-07-30 PROCEDURE — 25000003 PHARM REV CODE 250: Performed by: NURSE ANESTHETIST, CERTIFIED REGISTERED

## 2022-07-30 PROCEDURE — 25000003 PHARM REV CODE 250: Performed by: INTERNAL MEDICINE

## 2022-07-30 PROCEDURE — G0378 HOSPITAL OBSERVATION PER HR: HCPCS

## 2022-07-30 PROCEDURE — 86920 COMPATIBILITY TEST SPIN: CPT | Performed by: INTERNAL MEDICINE

## 2022-07-30 PROCEDURE — 83690 ASSAY OF LIPASE: CPT | Performed by: EMERGENCY MEDICINE

## 2022-07-30 PROCEDURE — 85025 COMPLETE CBC W/AUTO DIFF WBC: CPT | Mod: 91 | Performed by: NURSE PRACTITIONER

## 2022-07-30 PROCEDURE — 43239 EGD BIOPSY SINGLE/MULTIPLE: CPT | Performed by: INTERNAL MEDICINE

## 2022-07-30 PROCEDURE — C9113 INJ PANTOPRAZOLE SODIUM, VIA: HCPCS | Performed by: EMERGENCY MEDICINE

## 2022-07-30 PROCEDURE — 99223 PR INITIAL HOSPITAL CARE,LEVL III: ICD-10-PCS | Mod: ,,, | Performed by: INTERNAL MEDICINE

## 2022-07-30 PROCEDURE — 25000003 PHARM REV CODE 250: Performed by: EMERGENCY MEDICINE

## 2022-07-30 PROCEDURE — D9220A PRA ANESTHESIA: ICD-10-PCS | Mod: CRNA,,, | Performed by: NURSE ANESTHETIST, CERTIFIED REGISTERED

## 2022-07-30 PROCEDURE — D9220A PRA ANESTHESIA: ICD-10-PCS | Mod: ANES,,, | Performed by: ANESTHESIOLOGY

## 2022-07-30 PROCEDURE — 36415 COLL VENOUS BLD VENIPUNCTURE: CPT | Performed by: NURSE PRACTITIONER

## 2022-07-30 PROCEDURE — 63600175 PHARM REV CODE 636 W HCPCS: Performed by: NURSE ANESTHETIST, CERTIFIED REGISTERED

## 2022-07-30 PROCEDURE — 27201038 HC PROBE, BI-POLAR: Performed by: INTERNAL MEDICINE

## 2022-07-30 PROCEDURE — 43255 EGD CONTROL BLEEDING ANY: CPT | Performed by: INTERNAL MEDICINE

## 2022-07-30 PROCEDURE — 63600175 PHARM REV CODE 636 W HCPCS: Performed by: NURSE PRACTITIONER

## 2022-07-30 PROCEDURE — 27201012 HC FORCEPS, HOT/COLD, DISP: Performed by: INTERNAL MEDICINE

## 2022-07-30 PROCEDURE — 25500020 PHARM REV CODE 255

## 2022-07-30 PROCEDURE — 99285 EMERGENCY DEPT VISIT HI MDM: CPT | Mod: 25

## 2022-07-30 PROCEDURE — 36415 COLL VENOUS BLD VENIPUNCTURE: CPT | Performed by: EMERGENCY MEDICINE

## 2022-07-30 PROCEDURE — D9220A PRA ANESTHESIA: Mod: ANES,,, | Performed by: ANESTHESIOLOGY

## 2022-07-30 PROCEDURE — 84466 ASSAY OF TRANSFERRIN: CPT | Performed by: NURSE PRACTITIONER

## 2022-07-30 PROCEDURE — C9113 INJ PANTOPRAZOLE SODIUM, VIA: HCPCS | Performed by: INTERNAL MEDICINE

## 2022-07-30 PROCEDURE — 63600175 PHARM REV CODE 636 W HCPCS: Performed by: EMERGENCY MEDICINE

## 2022-07-30 PROCEDURE — 85025 COMPLETE CBC W/AUTO DIFF WBC: CPT | Performed by: EMERGENCY MEDICINE

## 2022-07-30 PROCEDURE — 37000008 HC ANESTHESIA 1ST 15 MINUTES: Performed by: INTERNAL MEDICINE

## 2022-07-30 PROCEDURE — 85025 COMPLETE CBC W/AUTO DIFF WBC: CPT | Mod: 91 | Performed by: INTERNAL MEDICINE

## 2022-07-30 PROCEDURE — 80053 COMPREHEN METABOLIC PANEL: CPT | Performed by: EMERGENCY MEDICINE

## 2022-07-30 PROCEDURE — 63600175 PHARM REV CODE 636 W HCPCS: Performed by: INTERNAL MEDICINE

## 2022-07-30 PROCEDURE — 25000003 PHARM REV CODE 250: Performed by: NURSE PRACTITIONER

## 2022-07-30 PROCEDURE — 36430 TRANSFUSION BLD/BLD COMPNT: CPT

## 2022-07-30 PROCEDURE — 88305 TISSUE EXAM BY PATHOLOGIST: CPT | Mod: 26,,, | Performed by: PATHOLOGY

## 2022-07-30 PROCEDURE — D9220A PRA ANESTHESIA: Mod: CRNA,,, | Performed by: NURSE ANESTHETIST, CERTIFIED REGISTERED

## 2022-07-30 PROCEDURE — P9016 RBC LEUKOCYTES REDUCED: HCPCS | Performed by: INTERNAL MEDICINE

## 2022-07-30 PROCEDURE — 99223 1ST HOSP IP/OBS HIGH 75: CPT | Mod: ,,, | Performed by: INTERNAL MEDICINE

## 2022-07-30 PROCEDURE — 82746 ASSAY OF FOLIC ACID SERUM: CPT | Performed by: NURSE PRACTITIONER

## 2022-07-30 PROCEDURE — 86901 BLOOD TYPING SEROLOGIC RH(D): CPT | Performed by: EMERGENCY MEDICINE

## 2022-07-30 PROCEDURE — C9113 INJ PANTOPRAZOLE SODIUM, VIA: HCPCS | Performed by: NURSE PRACTITIONER

## 2022-07-30 PROCEDURE — 96374 THER/PROPH/DIAG INJ IV PUSH: CPT

## 2022-07-30 PROCEDURE — 88305 TISSUE EXAM BY PATHOLOGIST: ICD-10-PCS | Mod: 26,,, | Performed by: PATHOLOGY

## 2022-07-30 PROCEDURE — 94761 N-INVAS EAR/PLS OXIMETRY MLT: CPT

## 2022-07-30 PROCEDURE — U0002 COVID-19 LAB TEST NON-CDC: HCPCS | Performed by: EMERGENCY MEDICINE

## 2022-07-30 RX ORDER — SODIUM CHLORIDE 9 MG/ML
INJECTION, SOLUTION INTRAVENOUS CONTINUOUS
Status: DISCONTINUED | OUTPATIENT
Start: 2022-07-30 | End: 2022-08-01 | Stop reason: HOSPADM

## 2022-07-30 RX ORDER — ONDANSETRON 2 MG/ML
8 INJECTION INTRAMUSCULAR; INTRAVENOUS EVERY 6 HOURS PRN
Status: DISCONTINUED | OUTPATIENT
Start: 2022-07-30 | End: 2022-08-01 | Stop reason: HOSPADM

## 2022-07-30 RX ORDER — PANTOPRAZOLE SODIUM 40 MG/10ML
80 INJECTION, POWDER, LYOPHILIZED, FOR SOLUTION INTRAVENOUS
Status: COMPLETED | OUTPATIENT
Start: 2022-07-30 | End: 2022-07-30

## 2022-07-30 RX ORDER — POLYETHYLENE GLYCOL 3350, SODIUM CHLORIDE, SODIUM BICARBONATE, POTASSIUM CHLORIDE 420; 11.2; 5.72; 1.48 G/4L; G/4L; G/4L; G/4L
4000 POWDER, FOR SOLUTION ORAL ONCE
Status: COMPLETED | OUTPATIENT
Start: 2022-07-30 | End: 2022-07-30

## 2022-07-30 RX ORDER — PROPOFOL 10 MG/ML
VIAL (ML) INTRAVENOUS
Status: DISCONTINUED | OUTPATIENT
Start: 2022-07-30 | End: 2022-07-30

## 2022-07-30 RX ORDER — LIDOCAINE HCL/PF 100 MG/5ML
SYRINGE (ML) INTRAVENOUS
Status: DISCONTINUED | OUTPATIENT
Start: 2022-07-30 | End: 2022-07-30

## 2022-07-30 RX ORDER — GLUCAGON 1 MG
1 KIT INJECTION
Status: DISCONTINUED | OUTPATIENT
Start: 2022-07-30 | End: 2022-08-01 | Stop reason: HOSPADM

## 2022-07-30 RX ORDER — PANTOPRAZOLE SODIUM 40 MG/10ML
40 INJECTION, POWDER, LYOPHILIZED, FOR SOLUTION INTRAVENOUS 2 TIMES DAILY
Status: DISCONTINUED | OUTPATIENT
Start: 2022-07-30 | End: 2022-08-01 | Stop reason: HOSPADM

## 2022-07-30 RX ORDER — EPINEPHRINE 0.1 MG/ML
INJECTION INTRAVENOUS
Status: DISCONTINUED
Start: 2022-07-30 | End: 2022-07-30 | Stop reason: WASHOUT

## 2022-07-30 RX ORDER — HYDROCODONE BITARTRATE AND ACETAMINOPHEN 500; 5 MG/1; MG/1
TABLET ORAL
Status: DISCONTINUED | OUTPATIENT
Start: 2022-07-30 | End: 2022-08-01 | Stop reason: HOSPADM

## 2022-07-30 RX ORDER — INSULIN ASPART 100 [IU]/ML
0-5 INJECTION, SOLUTION INTRAVENOUS; SUBCUTANEOUS EVERY 6 HOURS PRN
Status: DISCONTINUED | OUTPATIENT
Start: 2022-07-30 | End: 2022-08-01 | Stop reason: HOSPADM

## 2022-07-30 RX ADMIN — LIDOCAINE HYDROCHLORIDE 50 MG: 20 INJECTION INTRAVENOUS at 11:07

## 2022-07-30 RX ADMIN — ONDANSETRON 8 MG: 2 INJECTION INTRAMUSCULAR; INTRAVENOUS at 07:07

## 2022-07-30 RX ADMIN — PROPOFOL 60 MG: 10 INJECTION, EMULSION INTRAVENOUS at 11:07

## 2022-07-30 RX ADMIN — SODIUM CHLORIDE: 0.9 INJECTION, SOLUTION INTRAVENOUS at 05:07

## 2022-07-30 RX ADMIN — PANTOPRAZOLE SODIUM 40 MG: 40 INJECTION, POWDER, LYOPHILIZED, FOR SOLUTION INTRAVENOUS at 08:07

## 2022-07-30 RX ADMIN — SODIUM CHLORIDE 1000 ML: 0.9 INJECTION, SOLUTION INTRAVENOUS at 05:07

## 2022-07-30 RX ADMIN — IOHEXOL 100 ML: 350 INJECTION, SOLUTION INTRAVENOUS at 11:07

## 2022-07-30 RX ADMIN — SODIUM CHLORIDE: 0.9 INJECTION, SOLUTION INTRAVENOUS at 10:07

## 2022-07-30 RX ADMIN — SODIUM CHLORIDE 1000 ML: 0.9 INJECTION, SOLUTION INTRAVENOUS at 03:07

## 2022-07-30 RX ADMIN — PANTOPRAZOLE SODIUM 80 MG: 40 INJECTION, POWDER, LYOPHILIZED, FOR SOLUTION INTRAVENOUS at 03:07

## 2022-07-30 RX ADMIN — PROPOFOL 20 MG: 10 INJECTION, EMULSION INTRAVENOUS at 11:07

## 2022-07-30 RX ADMIN — POLYETHYLENE GLYCOL 3350, SODIUM CHLORIDE, SODIUM BICARBONATE AND POTASSIUM CHLORIDE WITH LEMON FLAVOR 4000 ML: 420; 11.2; 5.72; 1.48 POWDER, FOR SOLUTION ORAL at 05:07

## 2022-07-30 NOTE — ASSESSMENT & PLAN NOTE
There is no height or weight on file to calculate BMI. Morbid obesity complicates all aspects of disease management from diagnostic modalities to treatment. Weight loss encouraged and health benefits explained to patient.

## 2022-07-30 NOTE — PROVATION PATIENT INSTRUCTIONS
Discharge Summary/Instructions after an Endoscopic Procedure  Patient Name: Shantal Orourke  Patient MRN: [id number]  Patient YOB: 1950 Saturday, July 30, 2022  Mireille Mariano MD  Dear patient,  As a result of recent federal legislation (The Federal Cures Act), you may   receive lab or pathology results from your procedure in your [facility]   account before your physician is able to contact you. Your physician or   their representative will relay the results to you with their   recommendations at their soonest availability.  Thank you,  RESTRICTIONS:  During your procedure today, you received medications for sedation.  These   medications may affect your judgment, balance and coordination.  Therefore,   for 24 hours, you have the following restrictions:   - DO NOT drive a car, operate machinery, make legal/financial decisions,   sign important papers or drink alcohol.    ACTIVITY:  Today: no heavy lifting, straining or running due to procedural   sedation/anesthesia.  The following day: return to full activity including work.  DIET:  Eat and drink normally unless instructed otherwise.     TREATMENT FOR COMMON SIDE EFFECTS:  - Mild abdominal pain, nausea, belching, bloating or excessive gas:  rest,   eat lightly and use a heating pad.  - Sore Throat: treat with throat lozenges and/or gargle with warm salt   water.  - Because air was used during the procedure, expelling large amounts of air   from your rectum or belching is normal.  - If a bowel prep was taken, you may not have a bowel movement for 1-3 days.    This is normal.  SYMPTOMS TO WATCH FOR AND REPORT TO YOUR PHYSICIAN:  1. Abdominal pain or bloating, other than gas cramps.  2. Chest pain.  3. Back pain.  4. Signs of infection such as: chills or fever occurring within 24 hours   after the procedure.  5. Rectal bleeding, which would show as bright red, maroon, or black stools.   (A tablespoon of blood from the rectum is not serious,  especially if   hemorrhoids are present.)  6. Vomiting.  7. Weakness or dizziness.  GO DIRECTLY TO THE NEAREST EMERGENCY ROOM IF YOU HAVE ANY OF THE FOLLOWING:      Difficulty breathing              Chills and/or fever over 101 F   Persistent vomiting and/or vomiting blood   Severe abdominal pain   Severe chest pain   Black, tarry stools   Bleeding- more than one tablespoon   Any other symptom or condition that you feel may need urgent attention  Your doctor recommends these additional instructions:  If any biopsies were taken, your doctors clinic will contact you in 1 to 2   weeks with any results.  - Return patient to ICU for ongoing care.   - Clear liquid diet.   - Continue present medications.   - Await pathology results.   -Daily PPI x at least 12 weeks  -Avoid NSAIDs  - Repeat upper endoscopy in 8 weeks to check ulcer  healing (will arrange as   outpatient)  -Follow up CTA abdomen  For questions, problems or results please call your physician - Mireille Mariano MD at Work:  [phone].  [facility], EMERGENCY ROOM PHONE NUMBER: [phone]  IF A COMPLICATION OR EMERGENCY SITUATION ARISES AND YOU ARE UNABLE TO REACH   YOUR PHYSICIAN - GO DIRECTLY TO THE EMERGENCY ROOM.  Mireille Mariano MD  7/30/2022 11:55:06 AM  This report has been verified and signed electronically.  Dear patient,  As a result of recent federal legislation (The Federal Cures Act), you may   receive lab or pathology results from your procedure in your [facility]   account before your physician is able to contact you. Your physician or   their representative will relay the results to you with their   recommendations at their soonest availability.  Thank you,  PROVATION

## 2022-07-30 NOTE — ANESTHESIA PREPROCEDURE EVALUATION
07/30/2022  Shantal Orourke is a 71 y.o., female.    Pre-op Assessment    I have reviewed the Patient Summary Reports.    I have reviewed the Nursing Notes. I have reviewed the NPO Status.   I have reviewed the Medications.     Review of Systems  Anesthesia Hx:  No problems with previous Anesthesia    Social:  Non-Smoker    Cardiovascular:   Hypertension hyperlipidemia    Pulmonary:  Pulmonary Normal    Renal/:  Renal/ Normal     Hepatic/GI:   Liver Disease, Hepatitis, C    Neurological:  Neurology Normal    Endocrine:  Endocrine Normal        Physical Exam  General:  Obesity      Airway/Jaw/Neck:  Airway Findings: Mouth Opening: Normal   Tongue: Normal   General Airway Assessment: Adult, Good Mallampati: II  Improves to II with phonation.  TM Distance: 4-6 cm       Dental:  Dental Findings: In tact     Chest/Lungs:  Chest/Lungs Findings: Clear to auscultation, Normal Respiratory Rate      Heart/Vascular:  Heart Findings: Rate: Normal  Rhythm: Regular Rhythm  Sounds: Normal  Heart murmur: negative       Mental Status:  Mental Status Findings:  Cooperative, Alert and Oriented         Anesthesia Plan  Type of Anesthesia, risks & benefits discussed:  Anesthesia Type:  general    Patient's Preference:   Plan Factors:          Intra-op Monitoring Plan: standard ASA monitors  Intra-op Monitoring Plan Comments:   Post Op Pain Control Plan: multimodal analgesia and IV/PO Opioids PRN  Post Op Pain Control Plan Comments:     Induction:   IV  Beta Blocker:  Patient is not currently on a Beta-Blocker (No further documentation required).       Informed Consent: Informed consent signed with the Patient and all parties understand the risks and agree with anesthesia plan.  All questions answered.  Anesthesia consent signed with patient.  ASA Score: 3     Day of Surgery Review of History & Physical:  There are no significant  changes.  H&P Update referred to the surgeon/provider.          Ready For Surgery From Anesthesia Perspective.           Physical Exam  General: Obesity    Airway:  Mallampati: II / II  Mouth Opening: Normal  TM Distance: 4-6 cm  Tongue: Normal    Dental:  In tact    Chest/Lungs:  Clear to auscultation, Normal Respiratory Rate    Heart:  Rate: Normal  Rhythm: Regular Rhythm  Sounds: Normal          Anesthesia Plan  Type of Anesthesia, risks & benefits discussed:    Anesthesia Type: general  Intra-op Monitoring Plan: standard ASA monitors  Post Op Pain Control Plan: multimodal analgesia and IV/PO Opioids PRN  Induction:  IV  Informed Consent: Informed consent signed with the Patient and all parties understand the risks and agree with anesthesia plan.  All questions answered.   ASA Score: 3  Day of Surgery Review of History & Physical: H&P Update referred to the surgeon/provider.    Ready For Surgery From Anesthesia Perspective.       .

## 2022-07-30 NOTE — SUBJECTIVE & OBJECTIVE
Past Medical History:   Diagnosis Date    Cataract     Cataract of both eyes 1/26/2021    Patient would like to be referred to an [facility] physician    Diverticulitis     Encounter for blood transfusion     Hemorrhage     from rectum    Hepatitis A     High cholesterol     Hypertension        Past Surgical History:   Procedure Laterality Date    APPENDECTOMY      CATARACT EXTRACTION W/  INTRAOCULAR LENS IMPLANT Right 5/5/2021    Procedure: CEIOL OD;  Surgeon: Hardik Martinez MD;  Location: [facility] OR;  Service: Ophthalmology;  Laterality: Right;    CATARACT EXTRACTION W/  INTRAOCULAR LENS IMPLANT Left 6/30/2021    Procedure: CEIOL OS;  Surgeon: Hardik Martinez MD;  Location: [facility] OR;  Service: Ophthalmology;  Laterality: Left;    COLONOSCOPY      EYE SURGERY      HYSTERECTOMY      JOINT REPLACEMENT      KNEE SURGERY  05/01/2019    Right TKA    TONSILLECTOMY      TUBAL LIGATION         Review of patient's allergies indicates:   Allergen Reactions    Penicillins Anaphylaxis    Biaxin [clarithromycin] Itching    Macrodantin [nitrofurantoin macrocrystalline] Other (See Comments)     Made to feel ill       No current facility-administered medications on file prior to encounter.     Current Outpatient Medications on File Prior to Encounter   Medication Sig    albuterol (VENTOLIN HFA) 90 mcg/actuation inhaler Inhale 2 puffs into the lungs every 6 (six) hours as needed for Wheezing. Rescue    cetirizine (ZYRTEC) 10 MG tablet Take 1 tablet (10 mg total) by mouth once daily. (Patient not taking: Reported on 4/26/2022)    co-enzyme Q-10 30 mg capsule Take 30 mg by mouth once daily.    eszopiclone (LUNESTA) 3 mg Tab Take 3 mg by mouth.    fenofibrate (TRICOR) 145 MG tablet Take 1 tablet (145 mg total) by mouth once daily. (Patient not taking: Reported on 4/26/2022)    gemfibroziL (LOPID) 600 MG tablet Take 600 mg by mouth.    montelukast (SINGULAIR) 10 mg tablet Take 1 tablet (10 mg total) by mouth once daily.    oxybutynin  (DITROPAN-XL) 5 MG TR24 Take 1 tablet by mouth once daily.    rosuvastatin (CRESTOR) 40 MG Tab Take 1 tablet (40 mg total) by mouth every evening. (Patient not taking: Reported on 4/26/2022)    traZODone (DESYREL) 50 MG tablet Take 1 tablet (50 mg total) by mouth every evening.     Family History       Problem Relation (Age of Onset)    Psoriasis Child, Child, Child          Tobacco Use    Smoking status: Former Smoker     Packs/day: 1.00     Years: 30.00     Pack years: 30.00     Types: Cigarettes    Smokeless tobacco: Never Used   Substance and Sexual Activity    Alcohol use: No    Drug use: No    Sexual activity: Not on file     Review of Systems   Constitutional:  Negative for chills and fever.   HENT:  Negative for congestion and sore throat.    Eyes:  Negative for visual disturbance.   Respiratory:  Negative for cough and shortness of breath.    Cardiovascular:  Negative for chest pain and palpitations.   Gastrointestinal:  Positive for abdominal distention, anal bleeding, blood in stool and nausea. Negative for abdominal pain and vomiting.   Endocrine: Negative for cold intolerance and heat intolerance.   Genitourinary:  Negative for dysuria and hematuria.   Musculoskeletal:  Negative for arthralgias and myalgias.   Skin:  Negative for pallor and rash.   Neurological:  Positive for light-headedness. Negative for tremors and seizures.   Hematological:  Negative for adenopathy. Does not bruise/bleed easily.   Psychiatric/Behavioral:  Negative for hallucinations.    All other systems reviewed and are negative.  Objective:     Vital Signs (Most Recent):  Temp: 97.9 °F (36.6 °C) (07/30/22 0419)  Pulse: 86 (07/30/22 0429)  Resp: 18 (07/30/22 0429)  BP: (!) 74/50 (07/30/22 0429)  SpO2: 99 % (07/30/22 0429)   Vital Signs (24h Range):  Temp:  [97.9 °F (36.6 °C)] 97.9 °F (36.6 °C)  Pulse:  [82-98] 86  Resp:  [16-22] 18  SpO2:  [99 %-100 %] 99 %  BP: ()/(50-66) 74/50        There is no height or weight on file to  calculate BMI.    Physical Exam  Vitals and nursing note reviewed.   Constitutional:       General: She is awake.      Appearance: Normal appearance. She is well-developed. She is not ill-appearing.   HENT:      Head: Normocephalic and atraumatic.      Mouth/Throat:      Lips: Pink.      Mouth: Mucous membranes are moist.   Eyes:      Conjunctiva/sclera: Conjunctivae normal.      Pupils: Pupils are equal, round, and reactive to light.   Neck:      Thyroid: No thyromegaly.      Vascular: No JVD.   Cardiovascular:      Rate and Rhythm: Normal rate and regular rhythm.      Heart sounds: Normal heart sounds, S1 normal and S2 normal. No murmur heard.    No friction rub. No gallop.   Pulmonary:      Effort: Pulmonary effort is normal.      Breath sounds: Normal breath sounds.   Abdominal:      General: Abdomen is protuberant. Bowel sounds are normal. There is no distension.      Palpations: Abdomen is soft. There is no mass.      Tenderness: There is no abdominal tenderness.   Musculoskeletal:         General: Normal range of motion.      Cervical back: Neck supple.   Skin:     General: Skin is warm and dry.      Capillary Refill: Capillary refill takes less than 2 seconds.   Neurological:      General: No focal deficit present.      Mental Status: She is alert and oriented to person, place, and time. Mental status is at baseline.      GCS: GCS eye subscore is 4. GCS verbal subscore is 5. GCS motor subscore is 6.      Cranial Nerves: No cranial nerve deficit.      Sensory: Sensation is intact.      Motor: Motor function is intact.   Psychiatric:         Behavior: Behavior normal. Behavior is cooperative.         CRANIAL NERVES     CN III, IV, VI   Pupils are equal, round, and reactive to light.     Significant Labs: All pertinent labs within the past 24 hours have been reviewed.  CBC:   Recent Labs   Lab 07/30/22  0331   WBC 9.00   HGB 9.9*   HCT 29.2*        CMP:   Recent Labs   Lab 07/30/22  0331      K 4.1       CO2 21*   *   BUN 27*   CREATININE 1.1   CALCIUM 8.7   PROT 6.5   ALBUMIN 3.2*   BILITOT 0.4   ALKPHOS 74   AST 14   ALT 14   ANIONGAP 11   EGFRNONAA 51*     Lipase:   Recent Labs   Lab 07/30/22  0331   LIPASE 52     POCT Glucose:   Recent Labs   Lab 07/30/22  0421   POCTGLUCOSE 150*

## 2022-07-30 NOTE — ASSESSMENT & PLAN NOTE
Patient is chronically on statin.will not continue for now due to NPO status, resume when clinically appropriate. Monitor clinically. Last LDL was   Lab Results   Component Value Date    LDLCALC 64.8 12/06/2021

## 2022-07-30 NOTE — H&P
"[facility] Medicine  History & Physical    Patient Name: Shantal Orourke  MRN: [id number]  Patient Class: OP- Observation  Admission Date: 7/30/2022  Attending Physician:Sushma Islas MD  Primary Care Provider: Slava Elam MD         Patient information was obtained from patient, past medical records and ER records.     Subjective:     Principal Problem:GIB (gastrointestinal bleeding)    Chief Complaint:   Chief Complaint   Patient presents with    Rectal Bleeding     Bright red, a lot, for the past hour. SOB especially on exertion, diaphoretic         HPI: Shantal Orourke is a 71 year old female with a past medical history of diverticulitis, GI bleeds requiring blood transfusion, hypertension. Prediabetes and hyperlipidemia, who presented to day with a complaint of rectal bleeding. She states that prior to arrival she had multiple episodes of bright red blood with stools that progressed to the toilet being full of blood. She has a history of diverticulitis with hemorrhage, and feels like this is a similar presentation. She has had multiple GI scopes, but states "they never found anything." She denies abdominal pain, and only states that she feels bloated. She denies vomiting, but reports some nausea when she changes position. She had one episode of dizziness, shortness of breath and diaphoresis that resolved when she sat down. She denies fevers, chills, cough, CP, dysuria, hematuria or any other complaints.     ED work up was significant for visualization of bright red blood in stool. CBC notable for anemia, with a decrease in hemoglobin from 12 to 9. Orthostatics were performed and were positive for drop in blood pressure and rise in heart rate with position changes, as well as transient lightheadedness. GI was consulted by the ED, Dr. Mariano, who agreed with decision to admit. Hospital Medicine consulted for admission and further management.      Past Medical History:   Diagnosis Date    " Cataract     Cataract of both eyes 1/26/2021    Patient would like to be referred to an [facility] physician    Diverticulitis     Encounter for blood transfusion     Hemorrhage     from rectum    Hepatitis A     High cholesterol     Hypertension        Past Surgical History:   Procedure Laterality Date    APPENDECTOMY      CATARACT EXTRACTION W/  INTRAOCULAR LENS IMPLANT Right 5/5/2021    Procedure: CEIOL OD;  Surgeon: Hardik Martinez MD;  Location: [facility] OR;  Service: Ophthalmology;  Laterality: Right;    CATARACT EXTRACTION W/  INTRAOCULAR LENS IMPLANT Left 6/30/2021    Procedure: CEIOL OS;  Surgeon: Hardik Martinez MD;  Location: [facility] OR;  Service: Ophthalmology;  Laterality: Left;    COLONOSCOPY      EYE SURGERY      HYSTERECTOMY      JOINT REPLACEMENT      KNEE SURGERY  05/01/2019    Right TKA    TONSILLECTOMY      TUBAL LIGATION         Review of patient's allergies indicates:   Allergen Reactions    Penicillins Anaphylaxis    Biaxin [clarithromycin] Itching    Macrodantin [nitrofurantoin macrocrystalline] Other (See Comments)     Made to feel ill       No current facility-administered medications on file prior to encounter.     Current Outpatient Medications on File Prior to Encounter   Medication Sig    albuterol (VENTOLIN HFA) 90 mcg/actuation inhaler Inhale 2 puffs into the lungs every 6 (six) hours as needed for Wheezing. Rescue    cetirizine (ZYRTEC) 10 MG tablet Take 1 tablet (10 mg total) by mouth once daily. (Patient not taking: Reported on 4/26/2022)    co-enzyme Q-10 30 mg capsule Take 30 mg by mouth once daily.    eszopiclone (LUNESTA) 3 mg Tab Take 3 mg by mouth.    fenofibrate (TRICOR) 145 MG tablet Take 1 tablet (145 mg total) by mouth once daily. (Patient not taking: Reported on 4/26/2022)    gemfibroziL (LOPID) 600 MG tablet Take 600 mg by mouth.    montelukast (SINGULAIR) 10 mg tablet Take 1 tablet (10 mg total) by mouth once daily.    oxybutynin (DITROPAN-XL) 5 MG  TR24 Take 1 tablet by mouth once daily.    rosuvastatin (CRESTOR) 40 MG Tab Take 1 tablet (40 mg total) by mouth every evening. (Patient not taking: Reported on 4/26/2022)    traZODone (DESYREL) 50 MG tablet Take 1 tablet (50 mg total) by mouth every evening.     Family History       Problem Relation (Age of Onset)    Psoriasis Child, Child, Child          Tobacco Use    Smoking status: Former Smoker     Packs/day: 1.00     Years: 30.00     Pack years: 30.00     Types: Cigarettes    Smokeless tobacco: Never Used   Substance and Sexual Activity    Alcohol use: No    Drug use: No    Sexual activity: Not on file     Review of Systems   Constitutional:  Negative for chills and fever.   HENT:  Negative for congestion and sore throat.    Eyes:  Negative for visual disturbance.   Respiratory:  Negative for cough and shortness of breath.    Cardiovascular:  Negative for chest pain and palpitations.   Gastrointestinal:  Positive for abdominal distention, anal bleeding, blood in stool and nausea. Negative for abdominal pain and vomiting.   Endocrine: Negative for cold intolerance and heat intolerance.   Genitourinary:  Negative for dysuria and hematuria.   Musculoskeletal:  Negative for arthralgias and myalgias.   Skin:  Negative for pallor and rash.   Neurological:  Positive for light-headedness. Negative for tremors and seizures.   Hematological:  Negative for adenopathy. Does not bruise/bleed easily.   Psychiatric/Behavioral:  Negative for hallucinations.    All other systems reviewed and are negative.  Objective:     Vital Signs (Most Recent):  Temp: 97.9 °F (36.6 °C) (07/30/22 0419)  Pulse: 86 (07/30/22 0429)  Resp: 18 (07/30/22 0429)  BP: (!) 74/50 (07/30/22 0429)  SpO2: 99 % (07/30/22 0429)   Vital Signs (24h Range):  Temp:  [97.9 °F (36.6 °C)] 97.9 °F (36.6 °C)  Pulse:  [82-98] 86  Resp:  [16-22] 18  SpO2:  [99 %-100 %] 99 %  BP: ()/(50-66) 74/50        There is no height or weight on file to calculate  BMI.    Physical Exam  Vitals and nursing note reviewed.   Constitutional:       General: She is awake.      Appearance: Normal appearance. She is well-developed. She is not ill-appearing.   HENT:      Head: Normocephalic and atraumatic.      Mouth/Throat:      Lips: Pink.      Mouth: Mucous membranes are moist.   Eyes:      Conjunctiva/sclera: Conjunctivae normal.      Pupils: Pupils are equal, round, and reactive to light.   Neck:      Thyroid: No thyromegaly.      Vascular: No JVD.   Cardiovascular:      Rate and Rhythm: Normal rate and regular rhythm.      Heart sounds: Normal heart sounds, S1 normal and S2 normal. No murmur heard.    No friction rub. No gallop.   Pulmonary:      Effort: Pulmonary effort is normal.      Breath sounds: Normal breath sounds.   Abdominal:      General: Abdomen is protuberant. Bowel sounds are normal. There is no distension.      Palpations: Abdomen is soft. There is no mass.      Tenderness: There is no abdominal tenderness.   Musculoskeletal:         General: Normal range of motion.      Cervical back: Neck supple.   Skin:     General: Skin is warm and dry.      Capillary Refill: Capillary refill takes less than 2 seconds.   Neurological:      General: No focal deficit present.      Mental Status: She is alert and oriented to person, place, and time. Mental status is at baseline.      GCS: GCS eye subscore is 4. GCS verbal subscore is 5. GCS motor subscore is 6.      Cranial Nerves: No cranial nerve deficit.      Sensory: Sensation is intact.      Motor: Motor function is intact.   Psychiatric:         Behavior: Behavior normal. Behavior is cooperative.         CRANIAL NERVES     CN III, IV, VI   Pupils are equal, round, and reactive to light.     Significant Labs: All pertinent labs within the past 24 hours have been reviewed.  CBC:   Recent Labs   Lab 07/30/22  0331   WBC 9.00   HGB 9.9*   HCT 29.2*        CMP:   Recent Labs   Lab 07/30/22  0331      K 4.1       CO2 21*   *   BUN 27*   CREATININE 1.1   CALCIUM 8.7   PROT 6.5   ALBUMIN 3.2*   BILITOT 0.4   ALKPHOS 74   AST 14   ALT 14   ANIONGAP 11   EGFRNONAA 51*     Lipase:   Recent Labs   Lab 07/30/22  0331   LIPASE 52     POCT Glucose:   Recent Labs   Lab 07/30/22  0421   POCTGLUCOSE 150*           Assessment/Plan:     * GIB (gastrointestinal bleeding)  Keep patient NPO.  Follow H/H closely. Type and screen blood and transfuse as needed.  Protonix IV BID  Consult Gastronetrologist.   Check Iron, TIBC, B12 and folic acid.   Continue IVF hydration.   Use IV anti-emetics as needed.   Orthostatics positive, IVF hydration          Acute blood loss anemia  Patient's anemia is currently controlled. Has not received any PRBCs to date.. Etiology likely d/t acute blood loss from GI bleed  Current CBC reviewed-   Lab Results   Component Value Date    HGB 9.9 (L) 07/30/2022    HCT 29.2 (L) 07/30/2022     Monitor serial CBC and transfuse if patient becomes hemodynamically unstable, symptomatic or H/H drops below 7/21.         Primary insomnia    Noted    Prediabetes  Patient's FSGs are controlled on current medication regimen.  Last A1c reviewed-   Lab Results   Component Value Date    HGBA1C 6.0 (H) 06/20/2022     Most recent fingerstick glucose reviewed- No results for input(s): POCTGLUCOSE in the last 24 hours.  Current correctional scale  Low  Maintain anti-hyperglycemic dose as follows-   Antihyperglycemics (From admission, onward)            Start     Stop Route Frequency Ordered    07/30/22 0515  insulin aspart U-100 pen 0-5 Units         -- SubQ Every 6 hours PRN 07/30/22 0415        Hold Oral hypoglycemics while patient is in the hospital.        Overactive bladder  Noted, hold ditropan and resume when appropriate      Heart murmur  Noted        Gastroesophageal reflux disease without esophagitis  Noted, chronic      BMI 32.0-32.9,adult  There is no height or weight on file to calculate BMI. Morbid obesity  complicates all aspects of disease management from diagnostic modalities to treatment. Weight loss encouraged and health benefits explained to patient.        Hyperlipidemia   Patient is chronically on statin.will not continue for now due to NPO status, resume when clinically appropriate. Monitor clinically. Last LDL was   Lab Results   Component Value Date    LDLCALC 64.8 12/06/2021            Primary hypertension  Chronic, controlled.  Latest blood pressure and vitals reviewed-   Pulse:  [82-98]   Resp:  [16-22]   BP: ()/(56-66)   SpO2:  [99 %-100 %] .   Home meds for hypertension were reviewed and noted below.       While in the hospital, will manage blood pressure as follows; Continue home antihypertensive regimen-no meds    Will utilize p.r.n. blood pressure medication only if patient's blood pressure greater than  180/110 and she develops symptoms such as worsening chest pain or shortness of breath.          VTE Risk Mitigation (From admission, onward)         Ordered     IP VTE HIGH RISK PATIENT  Once         07/30/22 0408     Place sequential compression device  Until discontinued         07/30/22 0408                   BRITTANY Moody  Department of Hospital Medicine   [facility] - Emergency Dept    Addendum:  Patient seen and examined. I agree with the findings, assessment and plan as outlined in the note by the nursing practitioner. A substantive portion of H+P was performed by me.  Patient is a 71-year-old  female with past medical history significant for recurrent GI bleeding due to diverticular disease, history of diverticulitis, history of multiple packed red blood cell transfusion, colon polyps, hypertension, hyperlipidemia and borderline diabetes mellitus who has been admitted to Hospital Medicine for evaluation of rectal bleeding for 1 day.  Patient has had 3 bright red blood stool passages without any associated abdominal pain.  Patient's blood pressure is dropping with ongoing  rectal bleeding on the medicine floor.  Patient to be transferred to intensive care unit for closer monitoring.  Patient is presently NPO for colonoscopy.    Laboratory results reviewed which are showing downward trend of hemoglobin down to 7.8 grams/deciliter.    On exam abdomen is benign nontender nondistended no hepatosplenomegaly.  Vital signs stable except blood pressure is 89/52.  Patient is in no acute distress, alert oriented in times face and person.  Neurologically intact.  First and second heart sounds without added tones murmurs or gallops.  Lungs clear to auscultate bilaterally.    Patient's GI bleeding is likely again related to diverticular disease.  Case discussed with GI specialist.  Awaiting results of stat CT angiogram of abdomen and pelvis.  Patient to undergo colonoscopy this morning.  Maintain NPO.  Continue gastric ulcer prophylaxis.  Holding anticoagulation for ongoing GI bleeding.  Follow CBC and transfuse as needed.

## 2022-07-30 NOTE — PLAN OF CARE
EGD:  -Gastritis, biopsied   -Superficial gastric ulcer with mild oozing, treated with cautery    Recommendation:  -Though mild oozing seen from superficial gastric ulcer I do not think this is the etiology of her significant hematochezia and anemia, which is likely diverticular in origin  - CTA abdomen without active bleeding  -Ok for clear liquid diet with post-transfusion H&H and H&H every 8 hours after  -Will discuss prepping for colonoscopy with patient once she awakens, however she has refused in the past as she has had several and they have never identified the source of her bleeding    Mireille Mariano MD    Addendum:  -Patient agrees with colonoscopy tomorrow as recommended, clear liquids today, prep tonight, colonoscopy at 8am

## 2022-07-30 NOTE — HPI
"Shantal Orourke is a 71 year old female with a past medical history of diverticulitis, GI bleeds requiring blood transfusion, hypertension. Prediabetes and hyperlipidemia, who presented to day with a complaint of rectal bleeding. She states that prior to arrival she had multiple episodes of bright red blood with stools that progressed to the toilet being full of blood. She has a history of diverticulitis with hemorrhage, and feels like this is a similar presentation. She has had multiple GI scopes, but states "they never found anything." She denies abdominal pain, and only states that she feels bloated. She denies vomiting, but reports some nausea when she changes position. She had one episode of dizziness, shortness of breath and diaphoresis that resolved when she sat down. She denies fevers, chills, cough, CP, dysuria, hematuria or any other complaints.     ED work up was significant for visualization of bright red blood in stool. CBC notable for anemia, with a decrease in hemoglobin from 12 to 9. Orthostatics were performed and were positive for drop in blood pressure and rise in heart rate with position changes, as well as transient lightheadedness. GI was consulted by the ED, Dr. Mariano, who agreed with decision to admit. Hospital Medicine consulted for admission and further management.  "

## 2022-07-30 NOTE — ED PROVIDER NOTES
Encounter Date: 7/30/2022       History     Chief Complaint   Patient presents with    Rectal Bleeding     Bright red, a lot, for the past hour. SOB especially on exertion, diaphoretic      71-year-old female past medical history of diverticulitis, GI bleeds requiring blood transfusion, hypertension and hyperlipidemia presents today with hematochezia.  Patient reports approximately 2 hours prior to arrival she had multiple episodes of bright red blood per rectum.  Denies any nausea vomiting.  She reports she had a transient episode of shortness of breath, diaphoresis and lightheadedness that resolved with rest.  She said she has had multiple endoscopies and colonoscopies with have never found anything.  Patient reports he feels better now.  Denies any history of liver disease, anticoagulation IV drugs or alcoholism.  She does really take NSAIDs which does not take them in any high quantity or frequency because she knows she has a history of GI bleeds.  Denies fevers, chills, cough, CP, SOB, N/V, abdominal pain, dysuria, hematuria or any other complaints.               Review of patient's allergies indicates:   Allergen Reactions    Penicillins Anaphylaxis    Biaxin [clarithromycin] Itching    Macrodantin [nitrofurantoin macrocrystalline] Other (See Comments)     Made to feel ill     Past Medical History:   Diagnosis Date    Cataract     Cataract of both eyes 1/26/2021    Patient would like to be referred to an [facility] physician    Diverticulitis     Encounter for blood transfusion     Hemorrhage     from rectum    Hepatitis A     High cholesterol     Hypertension      Past Surgical History:   Procedure Laterality Date    APPENDECTOMY      CATARACT EXTRACTION W/  INTRAOCULAR LENS IMPLANT Right 5/5/2021    Procedure: CEIOL OD;  Surgeon: Hardik Martinez MD;  Location: [facility];  Service: Ophthalmology;  Laterality: Right;    CATARACT EXTRACTION W/  INTRAOCULAR LENS IMPLANT Left 6/30/2021    Procedure:  CEIOL OS;  Surgeon: Hardik Martinez MD;  Location: [facility] OR;  Service: Ophthalmology;  Laterality: Left;    COLONOSCOPY      EYE SURGERY      HYSTERECTOMY      JOINT REPLACEMENT      KNEE SURGERY  05/01/2019    Right TKA    TONSILLECTOMY      TUBAL LIGATION       Family History   Problem Relation Age of Onset    Psoriasis Child     Psoriasis Child     Psoriasis Child     Lupus Neg Hx     Melanoma Neg Hx      Social History     Tobacco Use    Smoking status: Former Smoker     Packs/day: 1.00     Years: 30.00     Pack years: 30.00     Types: Cigarettes    Smokeless tobacco: Never Used   Substance Use Topics    Alcohol use: No    Drug use: No     Review of Systems   Constitutional: Negative for activity change, diaphoresis and fever.   HENT: Negative for ear pain, rhinorrhea, sore throat and trouble swallowing.    Eyes: Negative for pain and visual disturbance.   Respiratory: Negative for cough, shortness of breath and stridor.    Cardiovascular: Negative for chest pain.   Gastrointestinal: Positive for anal bleeding and blood in stool. Negative for abdominal pain, diarrhea, nausea and vomiting.   Genitourinary: Negative for dysuria, hematuria, vaginal bleeding and vaginal discharge.   Musculoskeletal: Negative for gait problem.   Skin: Negative for rash and wound.   Neurological: Negative for seizures and headaches.   Psychiatric/Behavioral: Negative for hallucinations and suicidal ideas.       Physical Exam     Initial Vitals   BP Pulse Resp Temp SpO2   07/30/22 0302 07/30/22 0300 07/30/22 0302 07/30/22 0419 07/30/22 0300   92/66 98 (!) 22 97.9 °F (36.6 °C) 100 %      MAP       --                Physical Exam    Nursing note and vitals reviewed.  Constitutional: She appears well-developed. She is not diaphoretic. No distress.   HENT:   Head: Normocephalic and atraumatic.   Nose: Nose normal.   Eyes: EOM are normal. No scleral icterus.   Neck: Neck supple.   Normal range of motion.  Cardiovascular:  Normal rate, regular rhythm, normal heart sounds and intact distal pulses. Exam reveals no gallop and no friction rub.    No murmur heard.  Pulmonary/Chest: Breath sounds normal. No stridor. No respiratory distress. She has no wheezes. She has no rhonchi. She has no rales.   Abdominal: Abdomen is soft. Bowel sounds are normal. She exhibits no distension. There is no abdominal tenderness. There is no rebound and no guarding.   Musculoskeletal:         General: Normal range of motion.      Cervical back: Normal range of motion and neck supple.     Neurological: She is alert and oriented to person, place, and time. She has normal strength. No cranial nerve deficit or sensory deficit. GCS score is 15. GCS eye subscore is 4. GCS verbal subscore is 5. GCS motor subscore is 6.   Skin: Skin is warm and dry. Capillary refill takes less than 2 seconds. No rash noted.   Psychiatric: She has a normal mood and affect.         ED Course   Procedures  Labs Reviewed   CBC W/ AUTO DIFFERENTIAL - Abnormal; Notable for the following components:       Result Value    RBC 3.28 (*)     Hemoglobin 9.9 (*)     Hematocrit 29.2 (*)     All other components within normal limits   COMPREHENSIVE METABOLIC PANEL - Abnormal; Notable for the following components:    CO2 21 (*)     Glucose 180 (*)     BUN 27 (*)     Albumin 3.2 (*)     eGFR if  59 (*)     eGFR if non  51 (*)     All other components within normal limits   POCT GLUCOSE - Abnormal; Notable for the following components:    POCT Glucose 150 (*)     All other components within normal limits   LIPASE   SARS-COV-2 RNA AMPLIFICATION, QUAL   IRON AND TIBC   FOLATE   VITAMIN B12   TYPE & SCREEN   POCT GLUCOSE MONITORING CONTINUOUS          Imaging Results    None          Medications   0.9%  NaCl infusion (has no administration in time range)   pantoprazole injection 40 mg (has no administration in time range)   ondansetron injection 8 mg (has no  administration in time range)   glucagon (human recombinant) injection 1 mg (has no administration in time range)   dextrose 10% bolus 125 mL (has no administration in time range)   dextrose 10% bolus 250 mL (has no administration in time range)   insulin aspart U-100 pen 0-5 Units (has no administration in time range)   sodium chloride 0.9% bolus 1,000 mL (has no administration in time range)   sodium chloride 0.9% bolus 1,000 mL (1,000 mLs Intravenous New Bag 7/30/22 0338)   pantoprazole injection 80 mg (80 mg Intravenous Given 7/30/22 0339)     Medical Decision Making:   ED Management:  71-year-old female past medical history of GI bleed presents today with hematochezia.     Given history and exam patient's presentation most consistent with upper GI bleed possibly secondary to peptic ulcer disease or variceal bleeding.  I have low suspicion for aortoenteric fistula, ENT bleeding mimic, Boerhaave's, Pulmonary bleeding mimic.    Workup: CBC, BMP, LFTs, Lipase, PT/INR, Type and Screen    Interventions:  Protonix 80mg IVP    Disposition: Admit for close monitoring. GI Consulted             ED Course as of 07/30/22 0541   Sat Jul 30, 2022   0349 Hemoglobin(!): 9.9 [BD]   0353 Diverticulosis of colon with hemorrhage   · Good hypertension control  · Hyperlipidemia  · Seasonal allergic rhinitis due to pollen  · BMI 32.0-32.9,adult  · Abdominal aortic atherosclerosis     [BD]   0357 Discussed case with GI on-call, Dr. Lewis, who agrees with plan for admission [BD]      ED Course User Index  [BD] Cornelio Larry MD             Clinical Impression:   Final diagnoses:  [K92.2] Gastrointestinal hemorrhage, unspecified gastrointestinal hemorrhage type (Primary)  [K92.2] GIB (gastrointestinal bleeding)          ED Disposition Condition    Observation               Cornelio Larry MD  07/30/22 0557

## 2022-07-30 NOTE — NURSING
Notified Dr. Islas of pt's BP 86/56. Bolus complete. NS @75/ml infusing. Pt voiding without difficulty big red blood noticed on pt's pad. Will continue to monitor.

## 2022-07-30 NOTE — ANESTHESIA POSTPROCEDURE EVALUATION
Anesthesia Post Evaluation    Patient: Shantal Orourke    Procedure(s) Performed: Procedure(s) (LRB):  EGD (ESOPHAGOGASTRODUODENOSCOPY) (N/A)    Final Anesthesia Type: general      Patient location during evaluation: ICU  Patient participation: Yes- Able to Participate  Level of consciousness: awake and alert, oriented and awake  Post-procedure vital signs: reviewed and stable  Pain management: adequate  Airway patency: patent    PONV status at discharge: No PONV  Anesthetic complications: no      Cardiovascular status: blood pressure returned to baseline and hemodynamically stable  Respiratory status: unassisted, spontaneous ventilation and room air  Hydration status: euvolemic  Follow-up not needed.          Vitals Value Taken Time   /59 07/30/22 1225   Temp  07/30/22 1228   Pulse 77 07/30/22 1227   Resp 40 07/30/22 1227   SpO2 99 % 07/30/22 1227   Vitals shown include unvalidated device data.      Event Time   Out of Recovery 12:00:03         Pain/Gudelia Score: Gudelia Score: 9 (7/30/2022 11:59 AM)

## 2022-07-30 NOTE — ASSESSMENT & PLAN NOTE
Patient's anemia is currently controlled. Has not received any PRBCs to date.. Etiology likely d/t acute blood loss from GI bleed  Current CBC reviewed-   Lab Results   Component Value Date    HGB 9.9 (L) 07/30/2022    HCT 29.2 (L) 07/30/2022     Monitor serial CBC and transfuse if patient becomes hemodynamically unstable, symptomatic or H/H drops below 7/21.

## 2022-07-30 NOTE — NURSING
Pt tx to ICU Initial bp taken was 86/56 after bolus . Pt c/o of being cold several blankets given to pt. Also the pt stated she had to void assisted pt with a bedpan bright red blood noted. Notified MD. Bedside report given to SHERWIN Brown in ICU.

## 2022-07-30 NOTE — ASSESSMENT & PLAN NOTE
Keep patient NPO.  Follow H/H closely. Type and screen blood and transfuse as needed.  Protonix IV BID  Consult Gastronetrologist.   Check Iron, TIBC, B12 and folic acid.   Continue IVF hydration.   Use IV anti-emetics as needed.   Orthostatics positive, IVF hydration

## 2022-07-30 NOTE — ED NOTES
Pt c/o bright red rectal bleed x 2 hrs w/out c/o pain. Pt states she last had this in 2012. Pt states she usually has to have transfusions for this. Pt is A & O x 3, denies SOB, fever, chills and N/D, but admits to nausea. No obvious respiratory distress noted. Respirations are even and unlabored. Skin is warm and dry w/ pink mucosa. VS. TRISHA x 3mm. BBS- CTA . Abd- SNT. PSM x 4 exts. Bed is locked, in the low position and locked for safety. Call bell @ BS. Will continue to monitor closely.

## 2022-07-30 NOTE — PLAN OF CARE
[facility]  Initial Discharge Assessment       Primary Care Provider: Slava Elam MD    Admission Diagnosis: GIB (gastrointestinal bleeding) [K92.2]  Gastrointestinal hemorrhage, unspecified gastrointestinal hemorrhage type [K92.2]    Admission Date: 7/30/2022  Expected Discharge Date: 7/31/2022    Discharge Barriers Identified: None    Payor: MEDICARE / Plan: MEDICARE PART A & B / Product Type: [facility] /      completed the discharge planing assessment at pt's bedside. Pt son lives with her. Pt is independent in care. Demographic, PCP and Insurance is current on face and verified by patient. Pt is pre-diabetic. Pt does not monitor blood glucose. Medication is taken for pre-diabetes.  Pt denies dialysis and coumadin.  Denies hh or dme.  Disposition:  Pt will discharge to home. Daughter or son will provide transportation on discharge.      Extended Emergency Contact Information  Primary Emergency Contact: Charmaine Reid  Mobile Phone: [phone]  Relation: Daughter  Preferred language: English   needed? No    Discharge Plan A: Home with family  Discharge Plan B: Home with family      [id number] - [place] - [address].  [address] [place] [address].  [place] [id number]  Phone: [phone] Fax: [phone]      Initial Assessment (most recent)     Adult Discharge Assessment - 07/30/22 1639        Discharge Assessment    Assessment Type Discharge Planning Assessment     Confirmed/corrected address, phone number and insurance Yes     Source of Information patient     Does patient/caregiver understand observation status Yes     Communicated SHANTELL with patient/caregiver Yes     Lives With child(karrie), adult     Do you expect to return to your current living situation? Yes     Do you have help at home or someone to help you manage your care at home? Yes     Who are your caregiver(s) and their phone number(s)? son.  daughter- Charmaine - [phone]     Prior to hospitilization  cognitive status: Alert/Oriented     Current cognitive status: Alert/Oriented     Walking or Climbing Stairs Difficulty none     Dressing/Bathing Difficulty none     Equipment Currently Used at Home none     Readmission within 30 days? No     Patient currently being followed by outpatient case management? No     Do you currently have service(s) that help you manage your care at home? No     Do you take prescription medications? Yes     Do you have prescription coverage? Yes     Do you have any problems affording any of your prescribed medications? No     Is the patient taking medications as prescribed? yes     Who is going to help you get home at discharge? daughter or son     How do you get to doctors appointments? car, drives self     Are you on dialysis? No     Do you take coumadin? No     Discharge Plan A Home with family     Discharge Plan B Home with family     DME Needed Upon Discharge  none     Discharge Plan discussed with: Patient     Discharge Barriers Identified None

## 2022-07-30 NOTE — CONSULTS
[facility]     CC: Hematochezia    HPI 71 y.o. female with history of diverticulosis/diverticulitis and LGIB, presents with several hours of acute, recurrent, painless hematochezia associated with acute anemia and hypotension. She denies abdominal pain or hematemesis. She rarely takes NSAIDs. Her last colonoscopy was performed by Dr. Watts in February 2021 notable for diverticulosis of sigmoid, transverse and descending colon with small colon polyp. Previous CT in 2020 notable for ascending colon diverticulitis.     She notes multiple episodes of presumed diverticular bleeding in the past with numerous colonoscopies performed by Dr. Castillo without ever finding the source, last 10 years ago. She is unsure if she has had a prior EGD but does note frequent GERD and nausea.     Past Medical History:   Diagnosis Date    Cataract     Cataract of both eyes 1/26/2021    Patient would like to be referred to an [facility] physician    Diverticulitis     Encounter for blood transfusion     Hemorrhage     from rectum    Hepatitis A     High cholesterol     Hypertension        Past Surgical History:   Procedure Laterality Date    APPENDECTOMY      CATARACT EXTRACTION W/  INTRAOCULAR LENS IMPLANT Right 5/5/2021    Procedure: CEIOL OD;  Surgeon: Hardik Martinez MD;  Location: [facility] OR;  Service: Ophthalmology;  Laterality: Right;    CATARACT EXTRACTION W/  INTRAOCULAR LENS IMPLANT Left 6/30/2021    Procedure: CEIOL OS;  Surgeon: Hardik Martinez MD;  Location: [facility] OR;  Service: Ophthalmology;  Laterality: Left;    COLONOSCOPY      EYE SURGERY      HYSTERECTOMY      JOINT REPLACEMENT      KNEE SURGERY  05/01/2019    Right TKA    TONSILLECTOMY      TUBAL LIGATION         Social History     Tobacco Use    Smoking status: Former Smoker     Packs/day: 1.00     Years: 30.00     Pack years: 30.00     Types: Cigarettes    Smokeless tobacco: Never Used   Substance Use Topics    Alcohol use: No    Drug use:  "No       Family History   Problem Relation Age of Onset    Psoriasis Child     Psoriasis Child     Psoriasis Child     Lupus Neg Hx     Melanoma Neg Hx        Allergies and Medications reviewed     Review of Systems  General ROS: negative for - chills, fever or weight loss  Psychological ROS: negative for - hallucination, depression or suicidal ideation  Ophthalmic ROS: negative for - blurry vision, photophobia or eye pain  ENT ROS: negative for - epistaxis, sore throat or rhinorrhea  Respiratory ROS: no cough, shortness of breath, or wheezing  Cardiovascular ROS: no chest pain or dyspnea on exertion  Gastrointestinal ROS: + hematochezia, no abdominal pain, no hematemesis  Genito-Urinary ROS: no dysuria, trouble voiding, or hematuria  Musculoskeletal ROS: negative for - arthralgia, myalgia, + weakness  Neurological ROS: no syncope or seizures; no ataxia  Dermatological ROS: negative for pruritis, rash and jaundice    Physical Examination  BP (!) 129/57   Pulse 75   Temp 98 °F (36.7 °C)   Resp 18   Ht 5' 5" (1.651 m)   Wt 85 kg (187 lb 6.3 oz)   SpO2 100%   BMI 31.18 kg/m²   General appearance: alert, cooperative, no distress, pale  HENT: Normocephalic, atraumatic, neck symmetrical, no nasal discharge   Eyes: conjunctivae/corneas clear, PERRL, EOM's intact, sclera anicteric  Lungs: clear to auscultation bilaterally, no dullness to percussion bilaterally, symmetric expansion, breathing unlabored  Heart: regular rate and rhythm without rub; no displacement of the PMI   Abdomen: soft, nontender,nodistended, BS active ,no masses appreciated  Extremities: extremities symmetric; no clubbing, cyanosis, or edema  Integument: Skin color, texture, turgor normal; no rashes; hair distrubution normal, no jaundice  Neurologic: Alert and oriented X 3, no focal sensory or motor neurologic deficits  Psychiatric: no pressured speech; normal affect; no evidence of impaired cognition, no anxiety/depression     Labs:  Lab " Results   Component Value Date    WBC 8.79 07/30/2022    HGB 7.8 (L) 07/30/2022    HCT 23.5 (L) 07/30/2022    MCV 92 07/30/2022     07/30/2022       CMP  Sodium   Date Value Ref Range Status   07/30/2022 141 136 - 145 mmol/L Final   05/03/2019 139 134 - 144 mmol/L      Potassium   Date Value Ref Range Status   07/30/2022 4.1 3.5 - 5.1 mmol/L Final     Chloride   Date Value Ref Range Status   07/30/2022 109 95 - 110 mmol/L Final   05/03/2019 104 98 - 110 mmol/L      CO2   Date Value Ref Range Status   07/30/2022 21 (L) 23 - 29 mmol/L Final     Glucose   Date Value Ref Range Status   07/30/2022 180 (H) 70 - 110 mg/dL Final   05/03/2019 148 (H) 70 - 99 mg/dL      BUN   Date Value Ref Range Status   07/30/2022 27 (H) 8 - 23 mg/dL Final     Creatinine   Date Value Ref Range Status   07/30/2022 1.1 0.5 - 1.4 mg/dL Final   05/03/2019 0.65 0.60 - 1.40 mg/dL    02/04/2012 0.6 0.2 - 1.4 mg/dl Final     Calcium   Date Value Ref Range Status   07/30/2022 8.7 8.7 - 10.5 mg/dL Final   02/04/2012 10.1 8.6 - 10.2 mg/dl Final     Total Protein   Date Value Ref Range Status   07/30/2022 6.5 6.0 - 8.4 g/dL Final     Albumin   Date Value Ref Range Status   07/30/2022 3.2 (L) 3.5 - 5.2 g/dL Final     Total Bilirubin   Date Value Ref Range Status   07/30/2022 0.4 0.1 - 1.0 mg/dL Final     Comment:     For infants and newborns, interpretation of results should be based  on gestational age, weight and in agreement with clinical  observations.    Premature Infant recommended reference ranges:  Up to 24 hours.............<8.0 mg/dL  Up to 48 hours............<12.0 mg/dL  3-5 days..................<15.0 mg/dL  6-29 days.................<15.0 mg/dL       Alkaline Phosphatase   Date Value Ref Range Status   07/30/2022 74 55 - 135 U/L Final   02/04/2012 106 23 - 119 UNIT/L Final     AST   Date Value Ref Range Status   07/30/2022 14 10 - 40 U/L Final   02/04/2012 16 10 - 34 UNIT/L Final     ALT   Date Value Ref Range Status   07/30/2022 14  10 - 44 U/L Final     Anion Gap   Date Value Ref Range Status   07/30/2022 11 8 - 16 mmol/L Final   02/04/2012 7 5 - 15 meq/L Final     eGFR if    Date Value Ref Range Status   07/30/2022 59 (A) >60 mL/min/1.73 m^2 Final     eGFR if non    Date Value Ref Range Status   07/30/2022 51 (A) >60 mL/min/1.73 m^2 Final     Comment:     Calculation used to obtain the estimated glomerular filtration  rate (eGFR) is the CKD-EPI equation.        -B12, Folate- normal    June 2022:  -Hgb- 13    Imaging:  CT abdomen 2020 was independently visualized and reviewed by me and showed 1. Prominent diverticular changes about the proximal ascending colon  with evidence of fairly advanced pericolonic perching due to acute  diverticulitis.  2. Sigmoid thickening and numerous diverticula due to long-standing  diverticulosis.  3. Postoperative changes of previous hysterectomy change.  4. Scattered nodes in the central mesentery likely due to low-grade  inflammatory process.  5. Bilateral extrarenal pelvises.    Assessment:   71 y.o. female with history of diverticulosis and LGIB, presents with acute, recurrent, painless hematochezia associated with acute anemia and hypotension. Her vitals have improved with IVFs and she is receiving her first unit of blood.     Plan:  -2 large bore IVs  -Continue volume resuscitation and transfusion of 1 unit pRBCs for now (discussed with Dr. Islas low threshold for further transfusion)  -STAT CTA abdomen  -Urgent EGD today  -NPO  -Continue to monitor in ICU  -Plan discussed with patient and Dr. Sultan Mireille Mariano MD  [facility]  [address], [address]  [place] [address]  Office: [phone]  Fax: [phone]

## 2022-07-30 NOTE — ASSESSMENT & PLAN NOTE
Chronic, controlled.  Latest blood pressure and vitals reviewed-   Pulse:  [82-98]   Resp:  [16-22]   BP: ()/(56-66)   SpO2:  [99 %-100 %] .   Home meds for hypertension were reviewed and noted below.       While in the hospital, will manage blood pressure as follows; Continue home antihypertensive regimen-no meds    Will utilize p.r.n. blood pressure medication only if patient's blood pressure greater than  180/110 and she develops symptoms such as worsening chest pain or shortness of breath.

## 2022-07-30 NOTE — TRANSFER OF CARE
"Anesthesia Transfer of Care Note    Patient: Shantal Orourke    Procedure(s) Performed: Procedure(s) (LRB):  EGD (ESOPHAGOGASTRODUODENOSCOPY) (N/A)    Patient location: ICU    Anesthesia Type: general    Transport from OR: Transported from OR on 2-3 L/min O2 by NC with adequate spontaneous ventilation    Post pain: adequate analgesia    Post assessment: no apparent anesthetic complications and tolerated procedure well    Post vital signs: stable    Level of consciousness: responds to stimulation and sedated    Nausea/Vomiting: no nausea/vomiting    Complications: none    Transfer of care protocol was followed      Last vitals:   Visit Vitals  /60   Pulse 93   Temp 36.7 °C (98 °F)   Resp 18   Ht 5' 5" (1.651 m)   Wt 85 kg (187 lb 6.3 oz)   SpO2 100%   BMI 31.18 kg/m²     "

## 2022-07-30 NOTE — ASSESSMENT & PLAN NOTE
Patient's FSGs are controlled on current medication regimen.  Last A1c reviewed-   Lab Results   Component Value Date    HGBA1C 6.0 (H) 06/20/2022     Most recent fingerstick glucose reviewed- No results for input(s): POCTGLUCOSE in the last 24 hours.  Current correctional scale  Low  Maintain anti-hyperglycemic dose as follows-   Antihyperglycemics (From admission, onward)            Start     Stop Route Frequency Ordered    07/30/22 0515  insulin aspart U-100 pen 0-5 Units         -- SubQ Every 6 hours PRN 07/30/22 0415        Hold Oral hypoglycemics while patient is in the hospital.

## 2022-07-30 NOTE — PLAN OF CARE
07/30/22 1641   PAGE Message   Medicare Outpatient and Observation Notification regarding financial responsibility Signed/date by patient/caregiver   Date PAGE was signed 07/30/22   Time PAGE was signed [id number]

## 2022-07-31 ENCOUNTER — ANESTHESIA (OUTPATIENT)
Dept: ENDOSCOPY | Facility: HOSPITAL | Age: 72
DRG: 378 | End: 2022-07-31
Payer: MEDICARE

## 2022-07-31 ENCOUNTER — ANESTHESIA EVENT (OUTPATIENT)
Dept: ENDOSCOPY | Facility: HOSPITAL | Age: 72
DRG: 378 | End: 2022-07-31
Payer: MEDICARE

## 2022-07-31 VITALS
RESPIRATION RATE: 40 BRPM | OXYGEN SATURATION: 97 % | SYSTOLIC BLOOD PRESSURE: 103 MMHG | HEART RATE: 86 BPM | DIASTOLIC BLOOD PRESSURE: 53 MMHG

## 2022-07-31 LAB
ALBUMIN SERPL BCP-MCNC: 3 G/DL (ref 3.5–5.2)
ALP SERPL-CCNC: 64 U/L (ref 55–135)
ALT SERPL W/O P-5'-P-CCNC: 12 U/L (ref 10–44)
ANION GAP SERPL CALC-SCNC: 6 MMOL/L (ref 8–16)
AST SERPL-CCNC: 13 U/L (ref 10–40)
BASOPHILS # BLD AUTO: 0.03 K/UL (ref 0–0.2)
BASOPHILS # BLD AUTO: 0.04 K/UL (ref 0–0.2)
BASOPHILS NFR BLD: 0.4 % (ref 0–1.9)
BASOPHILS NFR BLD: 0.4 % (ref 0–1.9)
BILIRUB SERPL-MCNC: 0.4 MG/DL (ref 0.1–1)
BLD PROD TYP BPU: NORMAL
BLD PROD TYP BPU: NORMAL
BLOOD UNIT EXPIRATION DATE: NORMAL
BLOOD UNIT EXPIRATION DATE: NORMAL
BLOOD UNIT TYPE CODE: 5100
BLOOD UNIT TYPE CODE: 5100
BLOOD UNIT TYPE: NORMAL
BLOOD UNIT TYPE: NORMAL
BUN SERPL-MCNC: 12 MG/DL (ref 8–23)
CALCIUM SERPL-MCNC: 8.2 MG/DL (ref 8.7–10.5)
CHLORIDE SERPL-SCNC: 115 MMOL/L (ref 95–110)
CO2 SERPL-SCNC: 22 MMOL/L (ref 23–29)
CODING SYSTEM: NORMAL
CODING SYSTEM: NORMAL
CREAT SERPL-MCNC: 0.8 MG/DL (ref 0.5–1.4)
DIFFERENTIAL METHOD: ABNORMAL
DIFFERENTIAL METHOD: ABNORMAL
DISPENSE STATUS: NORMAL
DISPENSE STATUS: NORMAL
EOSINOPHIL # BLD AUTO: 0.1 K/UL (ref 0–0.5)
EOSINOPHIL # BLD AUTO: 0.1 K/UL (ref 0–0.5)
EOSINOPHIL NFR BLD: 1.2 % (ref 0–8)
EOSINOPHIL NFR BLD: 1.7 % (ref 0–8)
ERYTHROCYTE [DISTWIDTH] IN BLOOD BY AUTOMATED COUNT: 13.3 % (ref 11.5–14.5)
ERYTHROCYTE [DISTWIDTH] IN BLOOD BY AUTOMATED COUNT: 13.5 % (ref 11.5–14.5)
EST. GFR  (AFRICAN AMERICAN): >60 ML/MIN/1.73 M^2
EST. GFR  (NON AFRICAN AMERICAN): >60 ML/MIN/1.73 M^2
GLUCOSE SERPL-MCNC: 102 MG/DL (ref 70–110)
HCT VFR BLD AUTO: 23.4 % (ref 37–48.5)
HCT VFR BLD AUTO: 23.8 % (ref 37–48.5)
HGB BLD-MCNC: 7.8 G/DL (ref 12–16)
HGB BLD-MCNC: 8.2 G/DL (ref 12–16)
IMM GRANULOCYTES # BLD AUTO: 0.02 K/UL (ref 0–0.04)
IMM GRANULOCYTES # BLD AUTO: 0.04 K/UL (ref 0–0.04)
IMM GRANULOCYTES NFR BLD AUTO: 0.2 % (ref 0–0.5)
IMM GRANULOCYTES NFR BLD AUTO: 0.5 % (ref 0–0.5)
LYMPHOCYTES # BLD AUTO: 3.3 K/UL (ref 1–4.8)
LYMPHOCYTES # BLD AUTO: 3.8 K/UL (ref 1–4.8)
LYMPHOCYTES NFR BLD: 37 % (ref 18–48)
LYMPHOCYTES NFR BLD: 45.8 % (ref 18–48)
MCH RBC QN AUTO: 30.7 PG (ref 27–31)
MCH RBC QN AUTO: 31.1 PG (ref 27–31)
MCHC RBC AUTO-ENTMCNC: 33.3 G/DL (ref 32–36)
MCHC RBC AUTO-ENTMCNC: 34.5 G/DL (ref 32–36)
MCV RBC AUTO: 90 FL (ref 82–98)
MCV RBC AUTO: 92 FL (ref 82–98)
MONOCYTES # BLD AUTO: 0.6 K/UL (ref 0.3–1)
MONOCYTES # BLD AUTO: 0.6 K/UL (ref 0.3–1)
MONOCYTES NFR BLD: 6.9 % (ref 4–15)
MONOCYTES NFR BLD: 7 % (ref 4–15)
NEUTROPHILS # BLD AUTO: 3.7 K/UL (ref 1.8–7.7)
NEUTROPHILS # BLD AUTO: 4.9 K/UL (ref 1.8–7.7)
NEUTROPHILS NFR BLD: 44.6 % (ref 38–73)
NEUTROPHILS NFR BLD: 54.3 % (ref 38–73)
NRBC BLD-RTO: 0 /100 WBC
NRBC BLD-RTO: 0 /100 WBC
NUM UNITS TRANS PACKED RBC: NORMAL
NUM UNITS TRANS PACKED RBC: NORMAL
PLATELET # BLD AUTO: 215 K/UL (ref 150–450)
PLATELET # BLD AUTO: 230 K/UL (ref 150–450)
PMV BLD AUTO: 9.3 FL (ref 9.2–12.9)
PMV BLD AUTO: 9.6 FL (ref 9.2–12.9)
POCT GLUCOSE: 68 MG/DL (ref 70–110)
POCT GLUCOSE: 94 MG/DL (ref 70–110)
POCT GLUCOSE: 98 MG/DL (ref 70–110)
POTASSIUM SERPL-SCNC: 4 MMOL/L (ref 3.5–5.1)
PROT SERPL-MCNC: 5.6 G/DL (ref 6–8.4)
RBC # BLD AUTO: 2.54 M/UL (ref 4–5.4)
RBC # BLD AUTO: 2.64 M/UL (ref 4–5.4)
SODIUM SERPL-SCNC: 143 MMOL/L (ref 136–145)
WBC # BLD AUTO: 8.23 K/UL (ref 3.9–12.7)
WBC # BLD AUTO: 8.99 K/UL (ref 3.9–12.7)

## 2022-07-31 PROCEDURE — 45382 COLONOSCOPY W/CONTROL BLEED: CPT | Mod: ,,, | Performed by: INTERNAL MEDICINE

## 2022-07-31 PROCEDURE — 27200997: Performed by: INTERNAL MEDICINE

## 2022-07-31 PROCEDURE — 37000008 HC ANESTHESIA 1ST 15 MINUTES: Performed by: INTERNAL MEDICINE

## 2022-07-31 PROCEDURE — D9220A PRA ANESTHESIA: Mod: CRNA,,, | Performed by: NURSE ANESTHETIST, CERTIFIED REGISTERED

## 2022-07-31 PROCEDURE — 45382 PR COLONOSCOPY,FLEX,W/CONTROL, BLEEDING: ICD-10-PCS | Mod: ,,, | Performed by: INTERNAL MEDICINE

## 2022-07-31 PROCEDURE — C9113 INJ PANTOPRAZOLE SODIUM, VIA: HCPCS | Performed by: INTERNAL MEDICINE

## 2022-07-31 PROCEDURE — D9220A PRA ANESTHESIA: ICD-10-PCS | Mod: ANES,,, | Performed by: ANESTHESIOLOGY

## 2022-07-31 PROCEDURE — 45381 COLONOSCOPY SUBMUCOUS NJX: CPT | Performed by: INTERNAL MEDICINE

## 2022-07-31 PROCEDURE — 37000009 HC ANESTHESIA EA ADD 15 MINS: Performed by: INTERNAL MEDICINE

## 2022-07-31 PROCEDURE — 94761 N-INVAS EAR/PLS OXIMETRY MLT: CPT

## 2022-07-31 PROCEDURE — 20000000 HC ICU ROOM

## 2022-07-31 PROCEDURE — P9016 RBC LEUKOCYTES REDUCED: HCPCS | Performed by: INTERNAL MEDICINE

## 2022-07-31 PROCEDURE — 63600175 PHARM REV CODE 636 W HCPCS: Performed by: INTERNAL MEDICINE

## 2022-07-31 PROCEDURE — 45382 COLONOSCOPY W/CONTROL BLEED: CPT | Performed by: INTERNAL MEDICINE

## 2022-07-31 PROCEDURE — D9220A PRA ANESTHESIA: Mod: ANES,,, | Performed by: ANESTHESIOLOGY

## 2022-07-31 PROCEDURE — 25000003 PHARM REV CODE 250: Performed by: NURSE ANESTHETIST, CERTIFIED REGISTERED

## 2022-07-31 PROCEDURE — 27201038 HC PROBE, BI-POLAR: Performed by: INTERNAL MEDICINE

## 2022-07-31 PROCEDURE — 63600175 PHARM REV CODE 636 W HCPCS: Performed by: NURSE ANESTHETIST, CERTIFIED REGISTERED

## 2022-07-31 PROCEDURE — 80053 COMPREHEN METABOLIC PANEL: CPT | Performed by: INTERNAL MEDICINE

## 2022-07-31 PROCEDURE — 36415 COLL VENOUS BLD VENIPUNCTURE: CPT | Performed by: INTERNAL MEDICINE

## 2022-07-31 PROCEDURE — 85025 COMPLETE CBC W/AUTO DIFF WBC: CPT | Performed by: INTERNAL MEDICINE

## 2022-07-31 PROCEDURE — D9220A PRA ANESTHESIA: ICD-10-PCS | Mod: CRNA,,, | Performed by: NURSE ANESTHETIST, CERTIFIED REGISTERED

## 2022-07-31 RX ORDER — HYDROCODONE BITARTRATE AND ACETAMINOPHEN 500; 5 MG/1; MG/1
TABLET ORAL
Status: DISCONTINUED | OUTPATIENT
Start: 2022-07-31 | End: 2022-08-01 | Stop reason: HOSPADM

## 2022-07-31 RX ORDER — EPINEPHRINE 0.1 MG/ML
INJECTION INTRAVENOUS
Status: COMPLETED | OUTPATIENT
Start: 2022-07-31 | End: 2022-07-31

## 2022-07-31 RX ORDER — LIDOCAINE HCL/PF 100 MG/5ML
SYRINGE (ML) INTRAVENOUS
Status: DISCONTINUED | OUTPATIENT
Start: 2022-07-31 | End: 2022-07-31

## 2022-07-31 RX ORDER — EPINEPHRINE 0.1 MG/ML
INJECTION INTRAVENOUS
Status: DISCONTINUED
Start: 2022-07-31 | End: 2022-08-01 | Stop reason: HOSPADM

## 2022-07-31 RX ORDER — PROPOFOL 10 MG/ML
VIAL (ML) INTRAVENOUS
Status: DISCONTINUED | OUTPATIENT
Start: 2022-07-31 | End: 2022-07-31

## 2022-07-31 RX ADMIN — PANTOPRAZOLE SODIUM 40 MG: 40 INJECTION, POWDER, LYOPHILIZED, FOR SOLUTION INTRAVENOUS at 10:07

## 2022-07-31 RX ADMIN — LIDOCAINE HYDROCHLORIDE 50 MG: 20 INJECTION INTRAVENOUS at 08:07

## 2022-07-31 RX ADMIN — PROPOFOL 40 MG: 10 INJECTION, EMULSION INTRAVENOUS at 08:07

## 2022-07-31 RX ADMIN — PROPOFOL 60 MG: 10 INJECTION, EMULSION INTRAVENOUS at 08:07

## 2022-07-31 RX ADMIN — EPINEPHRINE 0.4 MG: 0.1 INJECTION, SOLUTION ENDOTRACHEAL; INTRACARDIAC; INTRAVENOUS at 08:07

## 2022-07-31 RX ADMIN — PANTOPRAZOLE SODIUM 40 MG: 40 INJECTION, POWDER, LYOPHILIZED, FOR SOLUTION INTRAVENOUS at 09:07

## 2022-07-31 RX ADMIN — PROPOFOL 40 MG: 10 INJECTION, EMULSION INTRAVENOUS at 09:07

## 2022-07-31 NOTE — TRANSFER OF CARE
"Anesthesia Transfer of Care Note    Patient: Shantal Orourke    Procedure(s) Performed: Procedure(s) (LRB):  COLONOSCOPY (N/A)    Patient location: ICU    Anesthesia Type: general    Transport from OR: Transported from OR on 2-3 L/min O2 by NC with adequate spontaneous ventilation    Post pain: adequate analgesia    Post assessment: no apparent anesthetic complications and tolerated procedure well    Post vital signs: stable    Level of consciousness: awake, alert and oriented    Nausea/Vomiting: no nausea/vomiting    Complications: none    Transfer of care protocol was followed      Last vitals:   Visit Vitals  /61   Pulse 87   Temp 36.8 °C (98.3 °F) (Oral)   Resp 20   Ht 5' 5" (1.651 m)   Wt 85 kg (187 lb 6.3 oz)   SpO2 97%   Breastfeeding No   BMI 31.18 kg/m²     "

## 2022-07-31 NOTE — PLAN OF CARE
POC reviewed with pt. JAMAICAS. Afebrile NPO since midnight. No c/o. Still with kristina ricky bleeding in stool but has very light colored stools otherwise. Ambulates to [facility] without issue. Independent. Pleasant demeanor. Ready for scope this am. Call light within reach. Safety maintained.     Problem: Adjustment to Illness (Gastrointestinal Bleeding)  Goal: Optimal Coping with Acute Illness  Outcome: Ongoing, Progressing     Problem: Bleeding (Gastrointestinal Bleeding)  Goal: Hemostasis  Outcome: Ongoing, Progressing     Problem: Adult Inpatient Plan of Care  Goal: Plan of Care Review  Outcome: Ongoing, Progressing  Goal: Patient-Specific Goal (Individualized)  Outcome: Ongoing, Progressing  Goal: Absence of Hospital-Acquired Illness or Injury  Outcome: Ongoing, Progressing  Goal: Optimal Comfort and Wellbeing  Outcome: Ongoing, Progressing  Goal: Readiness for Transition of Care  Outcome: Ongoing, Progressing     Problem: Fall Injury Risk  Goal: Absence of Fall and Fall-Related Injury  Outcome: Ongoing, Progressing

## 2022-07-31 NOTE — CARE UPDATE
07/30/22 2005   Patient Assessment/Suction   Level of Consciousness (AVPU) alert   PRE-TX-O2   O2 Device (Oxygen Therapy) room air   SpO2 99 %   Pulse Oximetry Type Continuous   $ Pulse Oximetry - Multiple Charge Pulse Oximetry - Multiple   Pulse 108   Resp 20   /75

## 2022-07-31 NOTE — PLAN OF CARE
Patient arrived from 2nd floor pcu. She is alert and orientated, without complaints, pleasant. Voiding spontaneously, continent. She has no pain. The 18 gauge to left AC was tender and so prior to starting PRBC, I started 20 gauge to right forearm. Started blood product and then went down for CTA. She tolerated infusion of the PRBC. Then for EGD, had to start second IV so started 20 gauge left upper AC (proximal to previous). I kept her npo until EGD done. Afterwards, she tolerated clear liquid diet. I ambulated her in the hallway at least 500 feat without difficulty. Normal saline is infusing per right forearm AC at 75ml/hr. Pending home medrec. She is sitting in the chair now drinking golytely. She had a BM now, first for my shift, at around 1900. It was a large loose BM that was somewhat dark red with clots.

## 2022-07-31 NOTE — PLAN OF CARE
Colonoscopy:  -Large amount of red blood/blood clot throughout colon (> 1.5 L removed)  -Diverticulosis of entire colon  -Diverticulum in descending colon with adherent clot, Epinephrine injected and clot removed with subsequent brisk bleeding, hemoclip placed x 3 with hemostasis achieved    Recommendation  -Ok for regular diet  -CBC now and every 8 hours   -Monitor in ICU overnight  -If stable overnight likely discharge tomorrow  -Avoid NSAIDs    Mireille Mariano MD

## 2022-07-31 NOTE — ANESTHESIA POSTPROCEDURE EVALUATION
Anesthesia Post Evaluation    Patient: Shantal Orourke    Procedure(s) Performed: Procedure(s) (LRB):  COLONOSCOPY (N/A)    Final Anesthesia Type: general      Patient location during evaluation: ICU  Patient participation: No - Unable to Participate, Sedation  Level of consciousness: sedated  Post-procedure vital signs: reviewed and stable  Pain management: adequate  Airway patency: patent    PONV status at discharge: No PONV  Anesthetic complications: no      Cardiovascular status: blood pressure returned to baseline and hemodynamically stable  Respiratory status: unassisted, spontaneous ventilation and nasal cannula  Hydration status: euvolemic  Follow-up not needed.          Vitals Value Taken Time   /61 07/31/22 0914   Temp  07/31/22 0918   Pulse 87 07/31/22 0917   Resp 24 07/31/22 0917   SpO2 96 % 07/31/22 0917   Vitals shown include unvalidated device data.      No case tracking events are documented in the log.      Pain/Gudelia Score: Gudelia Score: 9 (7/31/2022  9:14 AM)

## 2022-07-31 NOTE — PROGRESS NOTES
"[facility] Hospital Medicine  Progress Note    Patient Name: Shantal Orourke  MRN: [id number]  Patient Class: IP- Inpatient   Admission Date: 7/30/2022  Length of Stay: 0 days  Attending Physician: Sushma Islas MD  Primary Care Provider: Slava Elam MD        Subjective:     Principal Problem:GIB (gastrointestinal bleeding)        HPI:  Shantal Orourke is a 71 year old female with a past medical history of diverticulitis, GI bleeds requiring blood transfusion, hypertension. Prediabetes and hyperlipidemia, who presented to day with a complaint of rectal bleeding. She states that prior to arrival she had multiple episodes of bright red blood with stools that progressed to the toilet being full of blood. She has a history of diverticulitis with hemorrhage, and feels like this is a similar presentation. She has had multiple GI scopes, but states "they never found anything." She denies abdominal pain, and only states that she feels bloated. She denies vomiting, but reports some nausea when she changes position. She had one episode of dizziness, shortness of breath and diaphoresis that resolved when she sat down. She denies fevers, chills, cough, CP, dysuria, hematuria or any other complaints.     ED work up was significant for visualization of bright red blood in stool. CBC notable for anemia, with a decrease in hemoglobin from 12 to 9. Orthostatics were performed and were positive for drop in blood pressure and rise in heart rate with position changes, as well as transient lightheadedness. GI was consulted by the ED, Dr. Mariano, who agreed with decision to admit. Hospital Medicine consulted for admission and further management.      Overview/Hospital Course:  No notes on file    Interval History:  Patient underwent colonoscopy.  Subsequently developed rectal bleeding.  Patient is threatening to leave against medical advice despite repeated counseling.  Denies abdominal pain.  Complaining of hunger, " awaiting food.  Currently afebrile.    Review of Systems   Constitutional:  Negative for chills and fever.   HENT:  Negative for congestion and sore throat.    Eyes:  Negative for visual disturbance.   Respiratory:  Negative for cough and shortness of breath.    Cardiovascular:  Negative for chest pain and palpitations.   Gastrointestinal:  Positive for abdominal distention, anal bleeding, blood in stool and nausea. Negative for abdominal pain and vomiting.   Endocrine: Negative for cold intolerance and heat intolerance.   Genitourinary:  Negative for dysuria and hematuria.   Musculoskeletal:  Negative for arthralgias and myalgias.   Skin:  Negative for pallor and rash.   Neurological:  Positive for light-headedness. Negative for tremors and seizures.   Hematological:  Negative for adenopathy. Does not bruise/bleed easily.   Psychiatric/Behavioral:  Negative for hallucinations.    All other systems reviewed and are negative.  Objective:     Vital Signs (Most Recent):  Temp: 98.3 °F (36.8 °C) (07/31/22 0713)  Pulse: 84 (07/31/22 0919)  Resp: 20 (07/31/22 0919)  BP: 102/67 (07/31/22 0919)  SpO2: 97 % (07/31/22 0919) Vital Signs (24h Range):  Temp:  [97.7 °F (36.5 °C)-98.3 °F (36.8 °C)] 98.3 °F (36.8 °C)  Pulse:  [] 84  Resp:  [12-49] 20  SpO2:  [95 %-100 %] 97 %  BP: ()/(47-96) 102/67     Weight: 85 kg (187 lb 6.3 oz)  Body mass index is 31.18 kg/m².    Intake/Output Summary (Last 24 hours) at 7/31/2022 1013  Last data filed at 7/30/2022 1859  Gross per 24 hour   Intake 1920.08 ml   Output --   Net 1920.08 ml      Physical Exam  Vitals and nursing note reviewed.   Constitutional:       General: She is awake.      Appearance: Normal appearance. She is well-developed. She is not ill-appearing.   HENT:      Head: Normocephalic and atraumatic.      Mouth/Throat:      Lips: Pink.      Mouth: Mucous membranes are moist.   Eyes:      Conjunctiva/sclera: Conjunctivae normal.      Pupils: Pupils are equal, round,  and reactive to light.   Neck:      Thyroid: No thyromegaly.      Vascular: No JVD.   Cardiovascular:      Rate and Rhythm: Normal rate and regular rhythm.      Heart sounds: Normal heart sounds, S1 normal and S2 normal. No murmur heard.    No friction rub. No gallop.   Pulmonary:      Effort: Pulmonary effort is normal.      Breath sounds: Normal breath sounds.   Abdominal:      General: Abdomen is protuberant. Bowel sounds are normal. There is no distension.      Palpations: Abdomen is soft. There is no mass.      Tenderness: There is no abdominal tenderness.   Musculoskeletal:         General: Normal range of motion.      Cervical back: Neck supple.   Skin:     General: Skin is warm and dry.      Capillary Refill: Capillary refill takes less than 2 seconds.   Neurological:      General: No focal deficit present.      Mental Status: She is alert and oriented to person, place, and time. Mental status is at baseline.      GCS: GCS eye subscore is 4. GCS verbal subscore is 5. GCS motor subscore is 6.      Cranial Nerves: No cranial nerve deficit.      Sensory: Sensation is intact.      Motor: Motor function is intact.   Psychiatric:         Behavior: Behavior normal. Behavior is cooperative.       Significant Labs: All pertinent labs within the past 24 hours have been reviewed.  CBC:   Recent Labs   Lab 07/30/22  1842 07/31/22  0334 07/31/22  0927   WBC 9.82 8.99 8.23   HGB 9.3* 8.2* 7.8*   HCT 27.5* 23.8* 23.4*    230 215     CMP:   Recent Labs   Lab 07/30/22  0331 07/31/22  0334    143   K 4.1 4.0    115*   CO2 21* 22*   * 102   BUN 27* 12   CREATININE 1.1 0.8   CALCIUM 8.7 8.2*   PROT 6.5 5.6*   ALBUMIN 3.2* 3.0*   BILITOT 0.4 0.4   ALKPHOS 74 64   AST 14 13   ALT 14 12   ANIONGAP 11 6*   EGFRNONAA 51* >60     Coagulation: No results for input(s): PT, INR, APTT in the last 48 hours.    Significant Imaging:   Colonoscopy:  - Preparation of the colon was fair.                          -  Blood in the entire examined colon.                          - Diverticulosis in the entire examined colon, one                          with adherent clot and active bleeding after clot                          removal, hemostasis achieved with Epinephrine and                          hemoclip placement.                          - No specimens collected.     CTA abdomen and pelvis:  Normal CTA abdomen and pelvis except for moderate stenosis at the origin of the right internal iliac artery.  Hepatic steatosis.  Extensive diverticulosis.      Assessment/Plan:      * GIB (gastrointestinal bleeding)  Keep patient NPO.  Follow H/H closely. Type and screen blood and transfuse as needed.  Protonix IV BID  Consult Gastronetrologist.   Check Iron, TIBC, B12 and folic acid.   Continue IVF hydration.   Use IV anti-emetics as needed.   Orthostatics positive, IVF hydration          Acute blood loss anemia  Patient's anemia is currently controlled. .  Status post 1 unit of packed red blood cell on July 30, 2022. Will give another 2 units of packed red cells today.  Etiology likely d/t acute blood loss from GI bleed  Current CBC reviewed-   Lab Results   Component Value Date    HGB 7.8 (L) 07/31/2022    HCT 23.4 (L) 07/31/2022     Monitor serial CBC and transfuse if patient becomes hemodynamically unstable, symptomatic or H/H drops below 7/21.         Primary insomnia    Noted    Prediabetes  Patient's FSGs are controlled on current medication regimen.  Last A1c reviewed-   Lab Results   Component Value Date    HGBA1C 6.0 (H) 06/20/2022     Most recent fingerstick glucose reviewed- No results for input(s): POCTGLUCOSE in the last 24 hours.  Current correctional scale  Low  Maintain anti-hyperglycemic dose as follows-   Antihyperglycemics (From admission, onward)            Start     Stop Route Frequency Ordered    07/30/22 0550  insulin aspart U-100 pen 0-5 Units         -- SubQ Every 6 hours PRN 07/30/22 7612        Hold Oral  hypoglycemics while patient is in the hospital.        Overactive bladder  Noted, hold ditropan and resume when appropriate      Heart murmur  Noted        Gastroesophageal reflux disease without esophagitis  Noted, chronic      BMI 32.0-32.9,adult  There is no height or weight on file to calculate BMI. Morbid obesity complicates all aspects of disease management from diagnostic modalities to treatment. Weight loss encouraged and health benefits explained to patient.        Hyperlipidemia   Patient is chronically on statin.will not continue for now due to NPO status, resume when clinically appropriate. Monitor clinically. Last LDL was   Lab Results   Component Value Date    LDLCALC 64.8 12/06/2021            Primary hypertension  Chronic, controlled.  Latest blood pressure and vitals reviewed-   Pulse:  [82-98]   Resp:  [16-22]   BP: ()/(56-66)   SpO2:  [99 %-100 %] .   Home meds for hypertension were reviewed and noted below.       While in the hospital, will manage blood pressure as follows; Continue home antihypertensive regimen-no meds    Will utilize p.r.n. blood pressure medication only if patient's blood pressure greater than  180/110 and she develops symptoms such as worsening chest pain or shortness of breath.        Colonoscopy results reviewed with the patient.  Extensive counseling of patient done and importance to receive medical care at hospital.  For now patient is agreeable to stay in the hospital to receive treatment but states I cannot promise whether I am going to stay here beyond this evening.  VTE Risk Mitigation (From admission, onward)         Ordered     IP VTE HIGH RISK PATIENT  Once         07/30/22 0408     Place sequential compression device  Until discontinued         07/30/22 0408                Discharge Planning   SHANTELL:  August 1, 2022    Code Status: Full Code   Is the patient medically ready for discharge?:     Reason for patient still in hospital (select all that apply):  Patient trending condition and Consult recommendations  Discharge Plan A: Home with family            Critical care time spent on the evaluation and treatment of severe organ dysfunction, review of pertinent labs and imaging studies, discussions with consulting providers and discussions with patient/family: 33 minutes.      Sushma Islas MD  Department of Hospital Medicine   [facility]

## 2022-07-31 NOTE — NURSING
"Notified by Henny COURTNEY of patient attempting to get out of bed. This RN immediately went to bedside and patient found to be sitting on edge of bed. Patient verbalized being upset over "never feeling this bad after a procedure" referring to this morning's colonoscopy. Patient able to verbalize understanding regarding MD findings in colonoscopy. Patient reports nausea at this time, no emesis to note. Patient states "zofran doesn't help and the doctors won't give me anything else". I asked the patient if she would like me to reach out to Dr. Islas about nausea medication and she declined, however requested to speak with him at bedside. I called Dr. Islas and left voicemail. House supervisor Christina notified of events.    Vincent COURTNEY assisted patient back into bed and helped her with ADLs. Bed alarm set and call light within reach.  "

## 2022-07-31 NOTE — ASSESSMENT & PLAN NOTE
Patient's anemia is currently controlled. .  Status post 1 unit of packed red blood cell on July 30, 2022. Will give another 2 units of packed red cells today.  Etiology likely d/t acute blood loss from GI bleed  Current CBC reviewed-   Lab Results   Component Value Date    HGB 7.8 (L) 07/31/2022    HCT 23.4 (L) 07/31/2022     Monitor serial CBC and transfuse if patient becomes hemodynamically unstable, symptomatic or H/H drops below 7/21.

## 2022-07-31 NOTE — ANESTHESIA PREPROCEDURE EVALUATION
07/31/2022  Shantal Orourke is a 71 y.o., female.    Pre-op Assessment    I have reviewed the Patient Summary Reports.    I have reviewed the Nursing Notes. I have reviewed the NPO Status.   I have reviewed the Medications.     Review of Systems  Anesthesia Hx:  No problems with previous Anesthesia    Social:  Non-Smoker    Cardiovascular:   Hypertension hyperlipidemia    Pulmonary:  Pulmonary Normal    Renal/:  Renal/ Normal     Hepatic/GI:   Liver Disease, Hepatitis, C    Neurological:  Neurology Normal    Endocrine:  Endocrine Normal        Physical Exam  General:  Obesity      Airway/Jaw/Neck:  Airway Findings: Mouth Opening: Normal   Tongue: Normal   General Airway Assessment: Adult, Good Mallampati: II  Improves to II with phonation.  TM Distance: 4-6 cm       Dental:  Dental Findings: In tact     Chest/Lungs:  Chest/Lungs Findings: Clear to auscultation, Normal Respiratory Rate      Heart/Vascular:  Heart Findings: Rate: Normal  Rhythm: Regular Rhythm  Sounds: Normal  Heart murmur: negative       Mental Status:  Mental Status Findings:  Cooperative, Alert and Oriented         Anesthesia Plan  Type of Anesthesia, risks & benefits discussed:  Anesthesia Type:  general    Patient's Preference:   Plan Factors:          Intra-op Monitoring Plan: standard ASA monitors  Intra-op Monitoring Plan Comments:   Post Op Pain Control Plan: multimodal analgesia and IV/PO Opioids PRN  Post Op Pain Control Plan Comments:     Induction:   IV  Beta Blocker:  Patient is not currently on a Beta-Blocker (No further documentation required).       Informed Consent: Informed consent signed with the Patient and all parties understand the risks and agree with anesthesia plan.  All questions answered.  Anesthesia consent signed with patient.  ASA Score: 3     Day of Surgery Review of History & Physical:  There are no significant  changes.  H&P Update referred to the surgeon/provider.          Ready For Surgery From Anesthesia Perspective.           Physical Exam  General: Obesity    Airway:  Mallampati: II / II  Mouth Opening: Normal  TM Distance: 4-6 cm  Tongue: Normal    Dental:  In tact    Chest/Lungs:  Clear to auscultation, Normal Respiratory Rate    Heart:  Rate: Normal  Rhythm: Regular Rhythm  Sounds: Normal          Anesthesia Plan  Type of Anesthesia, risks & benefits discussed:    Anesthesia Type: general  Intra-op Monitoring Plan: standard ASA monitors  Post Op Pain Control Plan: multimodal analgesia and IV/PO Opioids PRN  Induction:  IV  Informed Consent: Informed consent signed with the Patient and all parties understand the risks and agree with anesthesia plan.  All questions answered.   ASA Score: 3  Day of Surgery Review of History & Physical: H&P Update referred to the surgeon/provider.    Ready For Surgery From Anesthesia Perspective.       .

## 2022-07-31 NOTE — SUBJECTIVE & OBJECTIVE
Interval History:  Patient underwent colonoscopy.  Subsequently developed rectal bleeding.  Patient is threatening to leave against medical advice despite repeated counseling.  Denies abdominal pain.  Complaining of hunger, awaiting food.  Currently afebrile.    Review of Systems   Constitutional:  Negative for chills and fever.   HENT:  Negative for congestion and sore throat.    Eyes:  Negative for visual disturbance.   Respiratory:  Negative for cough and shortness of breath.    Cardiovascular:  Negative for chest pain and palpitations.   Gastrointestinal:  Positive for abdominal distention, anal bleeding, blood in stool and nausea. Negative for abdominal pain and vomiting.   Endocrine: Negative for cold intolerance and heat intolerance.   Genitourinary:  Negative for dysuria and hematuria.   Musculoskeletal:  Negative for arthralgias and myalgias.   Skin:  Negative for pallor and rash.   Neurological:  Positive for light-headedness. Negative for tremors and seizures.   Hematological:  Negative for adenopathy. Does not bruise/bleed easily.   Psychiatric/Behavioral:  Negative for hallucinations.    All other systems reviewed and are negative.  Objective:     Vital Signs (Most Recent):  Temp: 98.3 °F (36.8 °C) (07/31/22 0713)  Pulse: 84 (07/31/22 0919)  Resp: 20 (07/31/22 0919)  BP: 102/67 (07/31/22 0919)  SpO2: 97 % (07/31/22 0919) Vital Signs (24h Range):  Temp:  [97.7 °F (36.5 °C)-98.3 °F (36.8 °C)] 98.3 °F (36.8 °C)  Pulse:  [] 84  Resp:  [12-49] 20  SpO2:  [95 %-100 %] 97 %  BP: ()/(47-96) 102/67     Weight: 85 kg (187 lb 6.3 oz)  Body mass index is 31.18 kg/m².    Intake/Output Summary (Last 24 hours) at 7/31/2022 1013  Last data filed at 7/30/2022 1859  Gross per 24 hour   Intake 1920.08 ml   Output --   Net 1920.08 ml      Physical Exam  Vitals and nursing note reviewed.   Constitutional:       General: She is awake.      Appearance: Normal appearance. She is well-developed. She is not  ill-appearing.   HENT:      Head: Normocephalic and atraumatic.      Mouth/Throat:      Lips: Pink.      Mouth: Mucous membranes are moist.   Eyes:      Conjunctiva/sclera: Conjunctivae normal.      Pupils: Pupils are equal, round, and reactive to light.   Neck:      Thyroid: No thyromegaly.      Vascular: No JVD.   Cardiovascular:      Rate and Rhythm: Normal rate and regular rhythm.      Heart sounds: Normal heart sounds, S1 normal and S2 normal. No murmur heard.    No friction rub. No gallop.   Pulmonary:      Effort: Pulmonary effort is normal.      Breath sounds: Normal breath sounds.   Abdominal:      General: Abdomen is protuberant. Bowel sounds are normal. There is no distension.      Palpations: Abdomen is soft. There is no mass.      Tenderness: There is no abdominal tenderness.   Musculoskeletal:         General: Normal range of motion.      Cervical back: Neck supple.   Skin:     General: Skin is warm and dry.      Capillary Refill: Capillary refill takes less than 2 seconds.   Neurological:      General: No focal deficit present.      Mental Status: She is alert and oriented to person, place, and time. Mental status is at baseline.      GCS: GCS eye subscore is 4. GCS verbal subscore is 5. GCS motor subscore is 6.      Cranial Nerves: No cranial nerve deficit.      Sensory: Sensation is intact.      Motor: Motor function is intact.   Psychiatric:         Behavior: Behavior normal. Behavior is cooperative.       Significant Labs: All pertinent labs within the past 24 hours have been reviewed.  CBC:   Recent Labs   Lab 07/30/22  1842 07/31/22  0334 07/31/22  0927   WBC 9.82 8.99 8.23   HGB 9.3* 8.2* 7.8*   HCT 27.5* 23.8* 23.4*    230 215     CMP:   Recent Labs   Lab 07/30/22  0331 07/31/22  0334    143   K 4.1 4.0    115*   CO2 21* 22*   * 102   BUN 27* 12   CREATININE 1.1 0.8   CALCIUM 8.7 8.2*   PROT 6.5 5.6*   ALBUMIN 3.2* 3.0*   BILITOT 0.4 0.4   ALKPHOS 74 64   AST 14 13    ALT 14 12   ANIONGAP 11 6*   EGFRNONAA 51* >60     Coagulation: No results for input(s): PT, INR, APTT in the last 48 hours.    Significant Imaging:   Colonoscopy:  - Preparation of the colon was fair.                          - Blood in the entire examined colon.                          - Diverticulosis in the entire examined colon, one                          with adherent clot and active bleeding after clot                          removal, hemostasis achieved with Epinephrine and                          hemoclip placement.                          - No specimens collected.     CTA abdomen and pelvis:  Normal CTA abdomen and pelvis except for moderate stenosis at the origin of the right internal iliac artery.  Hepatic steatosis.  Extensive diverticulosis.

## 2022-07-31 NOTE — [employer] PATIENT INSTRUCTIONS
Discharge Summary/Instructions after an Endoscopic Procedure  Patient Name: Shantal Orourke  Patient MRN: [id number]  Patient YOB: 1950 Sunday, July 31, 2022  Mireille Mariano MD  Dear patient,  As a result of recent federal legislation (The Federal Cures Act), you may   receive lab or pathology results from your procedure in your [facility]   account before your physician is able to contact you. Your physician or   their representative will relay the results to you with their   recommendations at their soonest availability.  Thank you,  RESTRICTIONS:  During your procedure today, you received medications for sedation.  These   medications may affect your judgment, balance and coordination.  Therefore,   for 24 hours, you have the following restrictions:   - DO NOT drive a car, operate machinery, make legal/financial decisions,   sign important papers or drink alcohol.    ACTIVITY:  Today: no heavy lifting, straining or running due to procedural   sedation/anesthesia.  The following day: return to full activity including work.  DIET:  Eat and drink normally unless instructed otherwise.     TREATMENT FOR COMMON SIDE EFFECTS:  - Mild abdominal pain, nausea, belching, bloating or excessive gas:  rest,   eat lightly and use a heating pad.  - Sore Throat: treat with throat lozenges and/or gargle with warm salt   water.  - Because air was used during the procedure, expelling large amounts of air   from your rectum or belching is normal.  - If a bowel prep was taken, you may not have a bowel movement for 1-3 days.    This is normal.  SYMPTOMS TO WATCH FOR AND REPORT TO YOUR PHYSICIAN:  1. Abdominal pain or bloating, other than gas cramps.  2. Chest pain.  3. Back pain.  4. Signs of infection such as: chills or fever occurring within 24 hours   after the procedure.  5. Rectal bleeding, which would show as bright red, maroon, or black stools.   (A tablespoon of blood from the rectum is not serious,  especially if   hemorrhoids are present.)  6. Vomiting.  7. Weakness or dizziness.  GO DIRECTLY TO THE NEAREST EMERGENCY ROOM IF YOU HAVE ANY OF THE FOLLOWING:      Difficulty breathing              Chills and/or fever over 101 F   Persistent vomiting and/or vomiting blood   Severe abdominal pain   Severe chest pain   Black, tarry stools   Bleeding- more than one tablespoon   Any other symptom or condition that you feel may need urgent attention  Your doctor recommends these additional instructions:  If any biopsies were taken, your doctors clinic will contact you in 1 to 2   weeks with any results.  - Return patient to ICU for ongoing care. Monitor in ICU overnight  - Advance diet as tolerated.   - Continue present medications.   - Repeat colonoscopy in 5 years for surveillance.   -CBC now and in 8 hours  -If bleeding recurs would discuss transfer for IR intervention  For questions, problems or results please call your physician - Mireille Mariano MD at Work:  [phone].  [facility], EMERGENCY ROOM PHONE NUMBER: [phone]  IF A COMPLICATION OR EMERGENCY SITUATION ARISES AND YOU ARE UNABLE TO REACH   YOUR PHYSICIAN - GO DIRECTLY TO THE EMERGENCY ROOM.  Mireille Mariano MD  7/31/2022 9:21:55 AM  This report has been verified and signed electronically.  Dear patient,  As a result of recent federal legislation (The Federal Cures Act), you may   receive lab or pathology results from your procedure in your [facility]   account before your physician is able to contact you. Your physician or   their representative will relay the results to you with their   recommendations at their soonest availability.  Thank you,  [employer]

## 2022-08-01 VITALS
OXYGEN SATURATION: 100 % | RESPIRATION RATE: 33 BRPM | SYSTOLIC BLOOD PRESSURE: 129 MMHG | WEIGHT: 188.06 LBS | TEMPERATURE: 98 F | DIASTOLIC BLOOD PRESSURE: 75 MMHG | HEART RATE: 68 BPM | HEIGHT: 65 IN | BODY MASS INDEX: 31.33 KG/M2

## 2022-08-01 LAB
BASOPHILS # BLD AUTO: 0.03 K/UL (ref 0–0.2)
BASOPHILS NFR BLD: 0.4 % (ref 0–1.9)
DIFFERENTIAL METHOD: ABNORMAL
EOSINOPHIL # BLD AUTO: 0.1 K/UL (ref 0–0.5)
EOSINOPHIL NFR BLD: 1.8 % (ref 0–8)
ERYTHROCYTE [DISTWIDTH] IN BLOOD BY AUTOMATED COUNT: 13.9 % (ref 11.5–14.5)
HCT VFR BLD AUTO: 29 % (ref 37–48.5)
HGB BLD-MCNC: 9.7 G/DL (ref 12–16)
IMM GRANULOCYTES # BLD AUTO: 0.03 K/UL (ref 0–0.04)
IMM GRANULOCYTES NFR BLD AUTO: 0.4 % (ref 0–0.5)
LYMPHOCYTES # BLD AUTO: 3.5 K/UL (ref 1–4.8)
LYMPHOCYTES NFR BLD: 49.1 % (ref 18–48)
MCH RBC QN AUTO: 30.1 PG (ref 27–31)
MCHC RBC AUTO-ENTMCNC: 33.4 G/DL (ref 32–36)
MCV RBC AUTO: 90 FL (ref 82–98)
MONOCYTES # BLD AUTO: 0.4 K/UL (ref 0.3–1)
MONOCYTES NFR BLD: 6.1 % (ref 4–15)
NEUTROPHILS # BLD AUTO: 3 K/UL (ref 1.8–7.7)
NEUTROPHILS NFR BLD: 42.2 % (ref 38–73)
NRBC BLD-RTO: 0 /100 WBC
PLATELET # BLD AUTO: 177 K/UL (ref 150–450)
PMV BLD AUTO: 10.2 FL (ref 9.2–12.9)
POCT GLUCOSE: 85 MG/DL (ref 70–110)
RBC # BLD AUTO: 3.22 M/UL (ref 4–5.4)
WBC # BLD AUTO: 7.03 K/UL (ref 3.9–12.7)

## 2022-08-01 PROCEDURE — 63600175 PHARM REV CODE 636 W HCPCS: Performed by: INTERNAL MEDICINE

## 2022-08-01 PROCEDURE — 94761 N-INVAS EAR/PLS OXIMETRY MLT: CPT

## 2022-08-01 PROCEDURE — 25000003 PHARM REV CODE 250: Performed by: INTERNAL MEDICINE

## 2022-08-01 PROCEDURE — 36415 COLL VENOUS BLD VENIPUNCTURE: CPT | Performed by: INTERNAL MEDICINE

## 2022-08-01 PROCEDURE — C9113 INJ PANTOPRAZOLE SODIUM, VIA: HCPCS | Performed by: INTERNAL MEDICINE

## 2022-08-01 PROCEDURE — 85025 COMPLETE CBC W/AUTO DIFF WBC: CPT | Performed by: INTERNAL MEDICINE

## 2022-08-01 RX ORDER — PANTOPRAZOLE SODIUM 40 MG/1
40 TABLET, DELAYED RELEASE ORAL DAILY
Qty: 30 TABLET | Refills: 0 | Status: SHIPPED | OUTPATIENT
Start: 2022-08-01 | End: 2023-08-01

## 2022-08-01 RX ADMIN — SODIUM CHLORIDE: 0.9 INJECTION, SOLUTION INTRAVENOUS at 07:08

## 2022-08-01 RX ADMIN — PANTOPRAZOLE SODIUM 40 MG: 40 INJECTION, POWDER, LYOPHILIZED, FOR SOLUTION INTRAVENOUS at 07:08

## 2022-08-01 NOTE — PLAN OF CARE
Discharge orders and chart reviewed. No other discharge needs noted at this time. Pt is clear for discharge from case management. Pt is discharging to home.        08/01/22 1240   Final Note   Assessment Type Final Discharge Note   Anticipated Discharge Disposition Home   What phone number can be called within the next 1-3 days to see how you are doing after discharge? [phone]   Hospital Resources/Appts/Education Provided Appointments scheduled and added to AVS

## 2022-08-01 NOTE — SUBJECTIVE & OBJECTIVE
Interval History:  Patient underwent colonoscopy.  Subsequently developed rectal bleeding.  Patient is threatening to leave against medical advice despite repeated counseling.  Denies abdominal pain.  Complaining of hunger, awaiting food.  Currently afebrile.    Review of Systems   Constitutional:  Negative for chills and fever.   HENT:  Negative for congestion and sore throat.    Eyes:  Negative for visual disturbance.   Respiratory:  Negative for cough and shortness of breath.    Cardiovascular:  Negative for chest pain and palpitations.   Gastrointestinal:  Positive for abdominal distention, anal bleeding, blood in stool and nausea. Negative for abdominal pain and vomiting.   Endocrine: Negative for cold intolerance and heat intolerance.   Genitourinary:  Negative for dysuria and hematuria.   Musculoskeletal:  Negative for arthralgias and myalgias.   Skin:  Negative for pallor and rash.   Neurological:  Positive for light-headedness. Negative for tremors and seizures.   Hematological:  Negative for adenopathy. Does not bruise/bleed easily.   Psychiatric/Behavioral:  Negative for hallucinations.    All other systems reviewed and are negative.  Objective:     Vital Signs (Most Recent):  Temp: 98.1 °F (36.7 °C) (08/01/22 0730)  Pulse: 68 (08/01/22 0809)  Resp: (!) 33 (08/01/22 0809)  BP: 129/75 (08/01/22 0730)  SpO2: 100 % (08/01/22 0809) Vital Signs (24h Range):  Temp:  [97.9 °F (36.6 °C)-98.5 °F (36.9 °C)] 98.1 °F (36.7 °C)  Pulse:  [59-95] 68  Resp:  [11-49] 33  SpO2:  [94 %-100 %] 100 %  BP: ()/(52-80) 129/75     Weight: 85.3 kg (188 lb 0.8 oz)  Body mass index is 31.29 kg/m².    Intake/Output Summary (Last 24 hours) at 8/1/2022 0928  Last data filed at 7/31/2022 2045  Gross per 24 hour   Intake 655.42 ml   Output --   Net 655.42 ml        Physical Exam  Vitals and nursing note reviewed.   Constitutional:       General: She is awake.      Appearance: Normal appearance. She is well-developed. She is not  ill-appearing.   HENT:      Head: Normocephalic and atraumatic.      Mouth/Throat:      Lips: Pink.      Mouth: Mucous membranes are moist.   Eyes:      Conjunctiva/sclera: Conjunctivae normal.      Pupils: Pupils are equal, round, and reactive to light.   Neck:      Thyroid: No thyromegaly.      Vascular: No JVD.   Cardiovascular:      Rate and Rhythm: Normal rate and regular rhythm.      Heart sounds: Normal heart sounds, S1 normal and S2 normal. No murmur heard.    No friction rub. No gallop.   Pulmonary:      Effort: Pulmonary effort is normal.      Breath sounds: Normal breath sounds.   Abdominal:      General: Abdomen is protuberant. Bowel sounds are normal. There is no distension.      Palpations: Abdomen is soft. There is no mass.      Tenderness: There is no abdominal tenderness.   Musculoskeletal:         General: Normal range of motion.      Cervical back: Neck supple.   Skin:     General: Skin is warm and dry.      Capillary Refill: Capillary refill takes less than 2 seconds.   Neurological:      General: No focal deficit present.      Mental Status: She is alert and oriented to person, place, and time. Mental status is at baseline.      GCS: GCS eye subscore is 4. GCS verbal subscore is 5. GCS motor subscore is 6.      Cranial Nerves: No cranial nerve deficit.      Sensory: Sensation is intact.      Motor: Motor function is intact.   Psychiatric:         Behavior: Behavior normal. Behavior is cooperative.       Significant Labs: All pertinent labs within the past 24 hours have been reviewed.  CBC:   Recent Labs   Lab 07/31/22  0334 07/31/22  0927 08/01/22  0523   WBC 8.99 8.23 7.03   HGB 8.2* 7.8* 9.7*   HCT 23.8* 23.4* 29.0*    215 177       CMP:   Recent Labs   Lab 07/31/22  0334      K 4.0   *   CO2 22*      BUN 12   CREATININE 0.8   CALCIUM 8.2*   PROT 5.6*   ALBUMIN 3.0*   BILITOT 0.4   ALKPHOS 64   AST 13   ALT 12   ANIONGAP 6*   EGFRNONAA >60       Coagulation: No  results for input(s): PT, INR, APTT in the last 48 hours.    Significant Imaging:   Colonoscopy:  - Preparation of the colon was fair.                          - Blood in the entire examined colon.                          - Diverticulosis in the entire examined colon, one                          with adherent clot and active bleeding after clot                          removal, hemostasis achieved with Epinephrine and                          hemoclip placement.                          - No specimens collected.     CTA abdomen and pelvis:  Normal CTA abdomen and pelvis except for moderate stenosis at the origin of the right internal iliac artery.  Hepatic steatosis.  Extensive diverticulosis.

## 2022-08-01 NOTE — PLAN OF CARE
POC reviewed with pt. Stated that she was going home today post h&h. No blood passed over night. No c/o of dizziness, fatigue or pain. No hypotension. Up to [facility] with stand by assist. Tolerated blood transfusion without issue. NSR. Afebrile. VSS. Call light within reach. Safety maintained.

## 2022-08-01 NOTE — DISCHARGE SUMMARY
"[facility] Hospital Medicine  Discharge Summary      Patient Name: Shantal Orourke  MRN: [id number]  Patient Class: IP- Inpatient  Admission Date: 7/30/2022  Hospital Length of Stay: 1 days  Discharge Date and Time:  08/01/2022 9:36 AM  Attending Physician: Sushma Islas MD   Discharging Provider: Sushma Islas MD  Primary Care Provider: Slava Elam MD      HPI:   Shantal Orourke is a 71 year old female with a past medical history of diverticulitis, GI bleeds requiring blood transfusion, hypertension. Prediabetes and hyperlipidemia, who presented to day with a complaint of rectal bleeding. She states that prior to arrival she had multiple episodes of bright red blood with stools that progressed to the toilet being full of blood. She has a history of diverticulitis with hemorrhage, and feels like this is a similar presentation. She has had multiple GI scopes, but states "they never found anything." She denies abdominal pain, and only states that she feels bloated. She denies vomiting, but reports some nausea when she changes position. She had one episode of dizziness, shortness of breath and diaphoresis that resolved when she sat down. She denies fevers, chills, cough, CP, dysuria, hematuria or any other complaints.     ED work up was significant for visualization of bright red blood in stool. CBC notable for anemia, with a decrease in hemoglobin from 12 to 9. Orthostatics were performed and were positive for drop in blood pressure and rise in heart rate with position changes, as well as transient lightheadedness. GI was consulted by the ED, Dr. Mariano, who agreed with decision to admit. Hospital Medicine consulted for admission and further management.      Procedure(s) (LRB):  COLONOSCOPY (N/A)      Hospital Course:   Patient was admitted to medicine telemetry service from where patient later transferred to intensive care unit for closer monitoring due to low blood pressure.  Patient was evaluated by " GI specialist. Hemoglobin and hematocrit closely monitored. Patient required a total of 4 units of packed red blood cells for symptomatic anemia due to acute GI blood loss. Patient under went endoscopic evaluations, results below. Hemoglobin and hematocrit remained stable, diet slowly advanced, patient tolerated diet. Symptoms resolved.  Patient has been cleared for discharge by GI specialist.  Patient would like to continue close follow-up with her primary GI specialist Dr. Dionicio bosch.  Diverticulosis diet precautions discussed with the patient.  Patient will have a surveillance CBC checked next week.  Patient feels ready to go home today.    Goals of Care Treatment Preferences:  Code Status: Full Code     EGD:  - Normal esophagus.                          - Small hiatal hernia.                          - Gastritis. Biopsied.                          - Oozing gastric ulcer. Treated with bipolar                          cautery.                          - Normal examined duodenum.                          -Patient with superficial gastric ulcer with mild                          oozing, treated with bipolar cautery. I do not                          think this explains her significant hematochezia                          or anemia, which is more consistent with                          diverticular bleeding.     Colonoscopy:  - Preparation of the colon was fair.                          - Blood in the entire examined colon.                          - Diverticulosis in the entire examined colon, one                          with adherent clot and active bleeding after clot                          removal, hemostasis achieved with Epinephrine and                          hemoclip placement.                          - No specimens collected.      CTA abdomen and pelvis:  Normal CTA abdomen and pelvis except for moderate stenosis at the origin of the right internal iliac artery.  Hepatic steatosis.  Extensive  diverticulosis.    Consults:   Consults (From admission, onward)        Status Ordering Provider     Inpatient consult to Gastroenterology  Once        Provider:  Mireille Mariano MD    Completed EVIE FIGUEREDO        Final Active Diagnoses:    Diagnosis Date Noted POA    PRINCIPAL PROBLEM:  GIB (gastrointestinal bleeding) [K92.2] 07/30/2022 Yes    Acute blood loss anemia [D62] 07/30/2022 Yes    Heart murmur [R01.1] 06/23/2022 Yes    Prediabetes [R73.03] 06/21/2022 Yes    Primary insomnia [F51.01] 03/24/2022 Yes    Overactive bladder [N32.81] 03/24/2022 Yes    Gastroesophageal reflux disease without esophagitis [K21.9] 03/24/2022 Yes    Hyperlipidemia [E78.5] 01/26/2021 Yes    BMI 32.0-32.9,adult [Z68.32] 01/26/2021 Not Applicable    Primary hypertension [I10] 01/26/2021 Yes      Problems Resolved During this Admission:       Discharged Condition: good    Disposition: Home or Self Care    Follow Up:   Follow-up Information     Slava Elam MD Follow up in 1 week(s).    Specialty: Family Medicine  Contact information:  [address] [facility] [id number] [phone]                       Patient Instructions:      CBC auto differential   Standing Status: Future Standing Exp. Date: 09/30/23     Diet Cardiac     Notify your health care provider if you experience any of the following:  severe uncontrolled pain     Notify your health care provider if you experience any of the following:  persistent nausea and vomiting or diarrhea     Notify your health care provider if you experience any of the following:  increased confusion or weakness     Notify your health care provider if you experience any of the following:  persistent dizziness, light-headedness, or visual disturbances     Activity as tolerated   Order Comments: Fall precautions       Significant Diagnostic Studies: Labs:   CMP   Recent Labs   Lab 07/31/22  0334      K 4.0   *   CO2 22*      BUN 12   CREATININE 0.8   CALCIUM 8.2*    PROT 5.6*   ALBUMIN 3.0*   BILITOT 0.4   ALKPHOS 64   AST 13   ALT 12   ANIONGAP 6*   ESTGFRAFRICA >60   EGFRNONAA >60   , CBC   Recent Labs   Lab 07/31/22  0334 07/31/22  0927 08/01/22  0523   WBC 8.99 8.23 7.03   HGB 8.2* 7.8* 9.7*   HCT 23.8* 23.4* 29.0*    215 177    and INR   Lab Results   Component Value Date    INR 1.2 05/03/2019    INR 1.03 02/04/2012       Pending Diagnostic Studies:     Procedure Component Value Units Date/Time    Specimen to Pathology, Surgery Gastrointestinal tract [[id number]] Collected: 07/30/22 1152    Order Status: Sent Lab Status: In process Updated: 07/31/22 2312    Specimen: Tissue          Medications:  Reconciled Home Medications:      Medication List      START taking these medications    pantoprazole 40 MG tablet  Commonly known as: PROTONIX  Take 1 tablet (40 mg total) by mouth once daily.        CONTINUE taking these medications    albuterol 90 mcg/actuation inhaler  Commonly known as: VENTOLIN HFA  Inhale 2 puffs into the lungs every 6 (six) hours as needed for Wheezing. Rescue     cetirizine 10 MG tablet  Commonly known as: ZYRTEC  Take 1 tablet (10 mg total) by mouth once daily.     co-enzyme Q-10 30 mg capsule  Take 30 mg by mouth once daily.     eszopiclone 3 mg Tab  Commonly known as: LUNESTA  Take 3 mg by mouth.     fenofibrate 145 MG tablet  Commonly known as: TRICOR  Take 1 tablet (145 mg total) by mouth once daily.     gemfibroziL 600 MG tablet  Commonly known as: LOPID  Take 600 mg by mouth.     montelukast 10 mg tablet  Commonly known as: SINGULAIR  Take 1 tablet (10 mg total) by mouth once daily.     oxybutynin 5 MG Tr24  Commonly known as: DITROPAN-XL  Take 1 tablet by mouth once daily.     rosuvastatin 40 MG Tab  Commonly known as: CRESTOR  Take 1 tablet (40 mg total) by mouth every evening.     traZODone 50 MG tablet  Commonly known as: DESYREL  Take 1 tablet (50 mg total) by mouth every evening.            Indwelling Lines/Drains at time of  discharge:   Lines/Drains/Airways     None                 Time spent on the discharge of patient: 33 minutes        Sushma Islas MD  Department of Hospital Medicine  [facility]

## 2022-08-01 NOTE — NURSING
Roya cleared patient for discharge via phone call.  cleared pt during rounds this morning & discussed discharge instructions/follow-up care. Pt's daughter is here to pick her up. Both verbalized understanding.

## 2022-08-01 NOTE — PLAN OF CARE
Updated pt's PCP information to Dr Elo Wilson as confirmed by the pt. Spoke with Tisha at Dr Wilson's office, she will contact the pt with an appt once she speaks with the Provider.

## 2022-08-03 LAB
FINAL PATHOLOGIC DIAGNOSIS: NORMAL
GROSS: NORMAL
Lab: NORMAL

## 2022-08-05 ENCOUNTER — LAB VISIT (OUTPATIENT)
Dept: LAB | Facility: HOSPITAL | Age: 72
End: 2022-08-05
Attending: INTERNAL MEDICINE
Payer: MEDICARE

## 2022-08-05 DIAGNOSIS — K92.2 GASTROINTESTINAL HEMORRHAGE, UNSPECIFIED GASTROINTESTINAL HEMORRHAGE TYPE: ICD-10-CM

## 2022-08-05 DIAGNOSIS — D62 ACUTE BLOOD LOSS ANEMIA: ICD-10-CM

## 2022-08-05 DIAGNOSIS — K92.1 HEMATOCHEZIA: ICD-10-CM

## 2022-08-05 LAB
BASOPHILS # BLD AUTO: 0.06 K/UL (ref 0–0.2)
BASOPHILS NFR BLD: 0.8 % (ref 0–1.9)
DIFFERENTIAL METHOD: ABNORMAL
EOSINOPHIL # BLD AUTO: 0.3 K/UL (ref 0–0.5)
EOSINOPHIL NFR BLD: 3.4 % (ref 0–8)
ERYTHROCYTE [DISTWIDTH] IN BLOOD BY AUTOMATED COUNT: 13.9 % (ref 11.5–14.5)
HCT VFR BLD AUTO: 33.5 % (ref 37–48.5)
HGB BLD-MCNC: 11.2 G/DL (ref 12–16)
IMM GRANULOCYTES # BLD AUTO: 0.04 K/UL (ref 0–0.04)
IMM GRANULOCYTES NFR BLD AUTO: 0.5 % (ref 0–0.5)
LYMPHOCYTES # BLD AUTO: 2.7 K/UL (ref 1–4.8)
LYMPHOCYTES NFR BLD: 36.4 % (ref 18–48)
MCH RBC QN AUTO: 30.2 PG (ref 27–31)
MCHC RBC AUTO-ENTMCNC: 33.4 G/DL (ref 32–36)
MCV RBC AUTO: 90 FL (ref 82–98)
MONOCYTES # BLD AUTO: 0.7 K/UL (ref 0.3–1)
MONOCYTES NFR BLD: 9.3 % (ref 4–15)
NEUTROPHILS # BLD AUTO: 3.6 K/UL (ref 1.8–7.7)
NEUTROPHILS NFR BLD: 49.6 % (ref 38–73)
NRBC BLD-RTO: 0 /100 WBC
PLATELET # BLD AUTO: 285 K/UL (ref 150–450)
PLATELET BLD QL SMEAR: ABNORMAL
PMV BLD AUTO: 10.5 FL (ref 9.2–12.9)
RBC # BLD AUTO: 3.71 M/UL (ref 4–5.4)
WBC # BLD AUTO: 7.31 K/UL (ref 3.9–12.7)

## 2022-08-05 PROCEDURE — 36415 COLL VENOUS BLD VENIPUNCTURE: CPT | Performed by: INTERNAL MEDICINE

## 2022-08-05 PROCEDURE — 85025 COMPLETE CBC W/AUTO DIFF WBC: CPT | Performed by: INTERNAL MEDICINE

## 2023-05-09 ENCOUNTER — OFFICE VISIT (OUTPATIENT)
Dept: OPHTHALMOLOGY | Facility: CLINIC | Age: 73
End: 2023-05-09
Payer: MEDICARE

## 2023-05-09 DIAGNOSIS — H04.123 DRY EYE SYNDROME, BILATERAL: ICD-10-CM

## 2023-05-09 DIAGNOSIS — H43.391 VITREOUS FLOATERS OF RIGHT EYE: ICD-10-CM

## 2023-05-09 DIAGNOSIS — R73.03 PREDIABETES: Primary | ICD-10-CM

## 2023-05-09 DIAGNOSIS — H40.053 OCULAR HYPERTENSION, BILATERAL: ICD-10-CM

## 2023-05-09 DIAGNOSIS — Z96.1 PSEUDOPHAKIA, BOTH EYES: ICD-10-CM

## 2023-05-09 PROCEDURE — 92133 POSTERIOR SEGMENT OCT OPTIC NERVE(OCULAR COHERENCE TOMOGRAPHY) - OU - BOTH EYES: ICD-10-PCS | Mod: 26,S$PBB,, | Performed by: OPHTHALMOLOGY

## 2023-05-09 PROCEDURE — 92133 CPTRZD OPH DX IMG PST SGM ON: CPT | Mod: PBBFAC,PO | Performed by: OPHTHALMOLOGY

## 2023-05-09 PROCEDURE — 92014 COMPRE OPH EXAM EST PT 1/>: CPT | Mod: S$PBB,,, | Performed by: OPHTHALMOLOGY

## 2023-05-09 PROCEDURE — 99999 PR PBB SHADOW E&M-EST. PATIENT-LVL III: CPT | Mod: PBBFAC,,, | Performed by: OPHTHALMOLOGY

## 2023-05-09 PROCEDURE — 99999 PR PBB SHADOW E&M-EST. PATIENT-LVL III: ICD-10-PCS | Mod: PBBFAC,,, | Performed by: OPHTHALMOLOGY

## 2023-05-09 PROCEDURE — 92014 PR EYE EXAM, EST PATIENT,COMPREHESV: ICD-10-PCS | Mod: S$PBB,,, | Performed by: OPHTHALMOLOGY

## 2023-05-09 PROCEDURE — 99213 OFFICE O/P EST LOW 20 MIN: CPT | Mod: PBBFAC,PO | Performed by: OPHTHALMOLOGY

## 2023-05-09 RX ORDER — METFORMIN HYDROCHLORIDE 500 MG/1
TABLET, EXTENDED RELEASE ORAL
COMMUNITY
Start: 2023-04-10

## 2023-05-09 RX ORDER — FERROUS SULFATE, DRIED 160(50) MG
1 TABLET, EXTENDED RELEASE ORAL
COMMUNITY

## 2023-05-09 NOTE — PROGRESS NOTES
HPI    DLS: 4/26/2022 Annual OHT exam     Pt states eyes are dry. Using tears OU PRN w/ relief. Allergy eyes   sometimes. Denies pain. FOL. Floaters are the same.   Last edited by Orin Matute on 5/9/2023  8:40 AM.            Assessment /Plan     For exam results, see Encounter Report.    Prediabetes    Pseudophakia, both eyes    Ocular hypertension, bilateral    Dry eye syndrome, bilateral    Vitreous floaters of right eye      1. Prediabetes  preDiabetes without retinopathy, discussed with patient importance of glucose control and follow up.  Patient voices understanding.    2. Pseudophakia, both eyes  Doing well    3. Ocular hypertension, bilateral  Very thick pachy  Neg famhx    OCT NFL nearly normal OU, mild concern for progression OD  Optic nerve heads appear normal  Tmax 27/27 (on latanoprost)  s/p CEIOL OU    IOP is normal, off latanoprost    Low risk, monitor off meds  F/u 1 year, dilate, OCT NFL    4. Dry eye syndrome, bilateral  Continue artificial tears    5. Vitreous floaters of right eye  RD precautions

## 2023-05-09 NOTE — PATIENT INSTRUCTIONS
What are floaters?     Floaters look like small specks, dots, circles, lines or cobwebs in your field of vision. While they seem to be in front of your eye, they are floating inside. Floaters are tiny clumps of gel or cells inside the vitreous that fills your eye. What you see are the shadows these clumps cast on your retina.    You usually notice floaters when looking  at something plain, like a blank wall or a blue ramesh.    As we age, our vitreous starts to thicken or shrink. Sometimes clumps or strands form in the vitreous. If the vitreous pulls away from the back of the eye, it is called posterior vitreous detachment. Floaters usually happen with posterior vitreous detachment. They are not serious, and they tend to fade or go away over time. Severe floaters can be removed by surgery, but this has risks and is seldom necessary.    You are more likely to get floaters if you:    are nearsighted (you need glasses to see far away)    have had surgery for cataracts    have had inflammation (swelling) inside the eye      What are flashes?     Flashes can look like flashing lights or lightning streaks in your field of vision. Some people compare them to seeing stars after being hit on the head. You might see flashes on and off for weeks, or even months. Flashes happen when the vitreous rubs or pulls on your retina.    As people age, it is common to see flashes occasionally.       When floaters and flashes are serious     Most floaters and flashes are not a problem. However, there are times when they can be signs of a serious condition. Here is when you should call an ophthalmologist right away:    you notice a lot of new floaters    you have a lot of flashes    a shadow appears in your peripheral (side) vision    a gray curtain covers part of  your vision    These floaters and flashes could be symptoms of a torn or detached retina. This is when the retina pulls away from the back of your eye. This is a serious  condition that needs to be treated.    Summary    Floaters are dark specks or dots in your field of vision. They are shadows you see from clumps of vitreous gel in your eye. Flashes are flashes of light that look like lightning streaks in your field of vision. Flashes occur when the vitreous gel rubs or pulls on your retina.    Floaters and flashes are quite common as people age. However, they can be signs of a retinal detachment, which is a serious problem. If you suddenly have a lot of floaters and see flashes, and you notice changes in your vision, call your ophthalmologist right away.

## 2023-06-15 ENCOUNTER — HOSPITAL ENCOUNTER (OUTPATIENT)
Dept: RADIOLOGY | Facility: HOSPITAL | Age: 73
Discharge: HOME | End: 2023-06-15
Attending: INTERNAL MEDICINE
Payer: MEDICARE

## 2023-06-15 DIAGNOSIS — Z12.31 VISIT FOR SCREENING MAMMOGRAM: ICD-10-CM

## 2023-06-15 PROCEDURE — 77067 MAMMO DIGITAL SCREENING BILAT WITH TOMO: ICD-10-PCS | Mod: 26,,, | Performed by: RADIOLOGY

## 2023-06-15 PROCEDURE — 77067 SCR MAMMO BI INCL CAD: CPT | Mod: 26,,, | Performed by: RADIOLOGY

## 2023-06-15 PROCEDURE — 77063 BREAST TOMOSYNTHESIS BI: CPT | Mod: 26,,, | Performed by: RADIOLOGY

## 2023-06-15 PROCEDURE — 77063 MAMMO DIGITAL SCREENING BILAT WITH TOMO: ICD-10-PCS | Mod: 26,,, | Performed by: RADIOLOGY

## 2023-06-15 PROCEDURE — 77067 SCR MAMMO BI INCL CAD: CPT | Mod: TC

## 2023-06-28 DIAGNOSIS — Z78.0 MENOPAUSE: Primary | ICD-10-CM

## 2023-06-29 ENCOUNTER — HOSPITAL ENCOUNTER (OUTPATIENT)
Dept: RADIOLOGY | Facility: HOSPITAL | Age: 73
Discharge: HOME | End: 2023-06-29
Attending: INTERNAL MEDICINE
Payer: MEDICARE

## 2023-06-29 DIAGNOSIS — R92.8 ABNORMAL MAMMOGRAM: ICD-10-CM

## 2023-06-29 PROCEDURE — 77061 BREAST TOMOSYNTHESIS UNI: CPT | Mod: 26,RT,, | Performed by: RADIOLOGY

## 2023-06-29 PROCEDURE — 77061 BREAST TOMOSYNTHESIS UNI: CPT | Mod: TC,RT

## 2023-06-29 PROCEDURE — 77065 DX MAMMO INCL CAD UNI: CPT | Mod: 26,RT,, | Performed by: RADIOLOGY

## 2023-06-29 PROCEDURE — 77065 MAMMO DIGITAL DIAGNOSTIC RIGHT WITH TOMO: ICD-10-PCS | Mod: 26,RT,, | Performed by: RADIOLOGY

## 2023-06-29 PROCEDURE — 77061 MAMMO DIGITAL DIAGNOSTIC RIGHT WITH TOMO: ICD-10-PCS | Mod: 26,RT,, | Performed by: RADIOLOGY

## 2023-07-18 ENCOUNTER — HOSPITAL ENCOUNTER (OUTPATIENT)
Dept: RADIOLOGY | Facility: HOSPITAL | Age: 73
Discharge: HOME | End: 2023-07-18
Attending: INTERNAL MEDICINE
Payer: MEDICARE

## 2023-07-18 DIAGNOSIS — R92.8 ABNORMAL MAMMOGRAM OF RIGHT BREAST: ICD-10-CM

## 2023-07-18 PROCEDURE — 76098 MAMMO BREAST SPECIMEN: ICD-10-PCS | Mod: 26,59,, | Performed by: RADIOLOGY

## 2023-07-18 PROCEDURE — 25000003 PHARM REV CODE 250: Performed by: INTERNAL MEDICINE

## 2023-07-18 PROCEDURE — 19081 MAMMO BREAST STEREOTACTIC BREAST BIOPSY RIGHT: ICD-10-PCS | Mod: RT,,, | Performed by: RADIOLOGY

## 2023-07-18 PROCEDURE — A4648 IMPLANTABLE TISSUE MARKER: HCPCS

## 2023-07-18 PROCEDURE — 88305 TISSUE EXAM BY PATHOLOGIST: CPT | Mod: TC | Performed by: PATHOLOGY

## 2023-07-18 PROCEDURE — 76098 X-RAY EXAM SURGICAL SPECIMEN: CPT | Mod: TC,59

## 2023-07-18 PROCEDURE — 76098 X-RAY EXAM SURGICAL SPECIMEN: CPT | Mod: 26,59,, | Performed by: RADIOLOGY

## 2023-07-18 PROCEDURE — 19081 BX BREAST 1ST LESION STRTCTC: CPT | Mod: RT,,, | Performed by: RADIOLOGY

## 2023-07-18 RX ADMIN — LIDOCAINE HYDROCHLORIDE 30 ML: 10; .005 INJECTION, SOLUTION EPIDURAL; INFILTRATION; INTRACAUDAL; PERINEURAL at 01:07

## 2023-08-08 ENCOUNTER — HOSPITAL ENCOUNTER (OUTPATIENT)
Dept: RADIOLOGY | Facility: CLINIC | Age: 73
Discharge: HOME | End: 2023-08-08
Attending: INTERNAL MEDICINE
Payer: MEDICARE

## 2023-08-08 DIAGNOSIS — Z78.0 MENOPAUSE: ICD-10-CM

## 2023-08-08 PROCEDURE — 77080 DXA BONE DENSITY AXIAL: CPT | Mod: 26,,, | Performed by: RADIOLOGY

## 2023-08-08 PROCEDURE — 77080 DXA BONE DENSITY AXIAL: CPT | Mod: TC,PO

## 2023-08-08 PROCEDURE — 77080 DXA BONE DENSITY AXIAL SKELETON 1 OR MORE SITES: ICD-10-PCS | Mod: 26,,, | Performed by: RADIOLOGY

## 2023-10-11 ENCOUNTER — PATIENT MESSAGE (OUTPATIENT)
Dept: FAMILY MEDICINE | Facility: CLINIC | Age: 73
End: 2023-10-11

## 2024-01-24 ENCOUNTER — HOSPITAL ENCOUNTER (OUTPATIENT)
Dept: RADIOLOGY | Facility: HOSPITAL | Age: 74
Discharge: HOME | End: 2024-01-24
Attending: INTERNAL MEDICINE
Payer: MEDICARE

## 2024-01-24 DIAGNOSIS — R92.8 ABNORMAL MAMMOGRAM OF RIGHT BREAST: ICD-10-CM

## 2024-01-24 PROCEDURE — 77061 BREAST TOMOSYNTHESIS UNI: CPT | Mod: TC,RT

## 2024-01-24 PROCEDURE — 77065 DX MAMMO INCL CAD UNI: CPT | Mod: TC,RT

## 2024-01-24 PROCEDURE — 77065 DX MAMMO INCL CAD UNI: CPT | Mod: 26,RT,, | Performed by: RADIOLOGY

## 2024-01-24 PROCEDURE — 77061 BREAST TOMOSYNTHESIS UNI: CPT | Mod: 26,RT,, | Performed by: RADIOLOGY

## 2024-07-09 ENCOUNTER — OFFICE VISIT (OUTPATIENT)
Dept: OPHTHALMOLOGY | Facility: CLINIC | Age: 74
End: 2024-07-09
Payer: MEDICARE

## 2024-07-09 DIAGNOSIS — Z96.1 PSEUDOPHAKIA, BOTH EYES: ICD-10-CM

## 2024-07-09 DIAGNOSIS — H43.393 VITREOUS FLOATERS, BILATERAL: ICD-10-CM

## 2024-07-09 DIAGNOSIS — R73.03 PREDIABETES: Primary | ICD-10-CM

## 2024-07-09 DIAGNOSIS — H40.053 OCULAR HYPERTENSION, BILATERAL: ICD-10-CM

## 2024-07-09 PROCEDURE — 99999 PR PBB SHADOW E&M-EST. PATIENT-LVL III: CPT | Mod: PBBFAC,,, | Performed by: OPHTHALMOLOGY

## 2024-07-09 PROCEDURE — 92014 COMPRE OPH EXAM EST PT 1/>: CPT | Mod: S$PBB,,, | Performed by: OPHTHALMOLOGY

## 2024-07-09 PROCEDURE — 92133 CPTRZD OPH DX IMG PST SGM ON: CPT | Mod: PBBFAC,PO | Performed by: OPHTHALMOLOGY

## 2024-07-09 PROCEDURE — 99213 OFFICE O/P EST LOW 20 MIN: CPT | Mod: PBBFAC,PO,25 | Performed by: OPHTHALMOLOGY

## 2024-07-09 NOTE — PROGRESS NOTES
HPI    Pt presents for yearly DM exam     States sees white lights in OS, but not OD. Has been going on for the past   few months. States also sees floaters OU, but OS is worse.    ATS PRN OU  Last edited by Jesika Gold on 7/9/2024  1:14 PM.            Assessment /Plan     For exam results, see Encounter Report.    Prediabetes    Pseudophakia, both eyes    Ocular hypertension, bilateral    Vitreous floaters, bilateral      1. Prediabetes  preDiabetes without retinopathy, discussed with patient importance of glucose control and follow up.  Patient voices understanding.    2. Pseudophakia, both eyes  Doing well    3. Ocular hypertension, bilateral  Very thick pachy  Neg famhx    OCT NFL nearly normal OU, stable  Optic nerve heads appear normal  Tmax 27/27 (on latanoprost)  s/p CEIOL OU    IOP is normal, off latanoprost    Low risk, monitor off meds    F/u 1 year, routine exam  I don't think we need to monitor for glaucoma      4. Vitreous floaters, bilateral  RD precs reviewed

## 2024-12-29 ENCOUNTER — HOSPITAL ENCOUNTER (INPATIENT)
Facility: HOSPITAL | Age: 74
LOS: 2 days | Discharge: HOME | DRG: 872 | End: 2024-12-31
Attending: STUDENT IN AN ORGANIZED HEALTH CARE EDUCATION/TRAINING PROGRAM | Admitting: HOSPITALIST
Payer: MEDICARE

## 2024-12-29 DIAGNOSIS — R53.83 FATIGUE: ICD-10-CM

## 2024-12-29 DIAGNOSIS — R65.20 SEVERE SEPSIS: ICD-10-CM

## 2024-12-29 DIAGNOSIS — R07.9 CHEST PAIN: ICD-10-CM

## 2024-12-29 DIAGNOSIS — A41.9 SEVERE SEPSIS: ICD-10-CM

## 2024-12-29 DIAGNOSIS — Z13.6 SCREENING FOR CARDIOVASCULAR CONDITION: ICD-10-CM

## 2024-12-29 PROBLEM — E11.9 TYPE 2 DIABETES MELLITUS: Status: ACTIVE | Noted: 2024-12-29

## 2024-12-29 PROBLEM — I51.89 DIASTOLIC DYSFUNCTION: Status: ACTIVE | Noted: 2022-12-27

## 2024-12-29 PROBLEM — K52.9 ENTERITIS: Status: ACTIVE | Noted: 2024-12-29

## 2024-12-29 LAB
ALBUMIN SERPL BCP-MCNC: 4.4 G/DL (ref 3.5–5.2)
ALP SERPL-CCNC: 98 U/L (ref 55–135)
ALT SERPL W/O P-5'-P-CCNC: 13 U/L (ref 10–44)
ANION GAP SERPL CALC-SCNC: 11 MMOL/L (ref 8–16)
AST SERPL-CCNC: 15 U/L (ref 10–40)
BASOPHILS # BLD AUTO: 0.04 K/UL (ref 0–0.2)
BASOPHILS NFR BLD: 0.2 % (ref 0–1.9)
BILIRUB SERPL-MCNC: 1 MG/DL (ref 0.1–1)
BUN SERPL-MCNC: 22 MG/DL (ref 8–23)
CALCIUM SERPL-MCNC: 9.4 MG/DL (ref 8.7–10.5)
CHLORIDE SERPL-SCNC: 103 MMOL/L (ref 95–110)
CO2 SERPL-SCNC: 24 MMOL/L (ref 23–29)
CREAT SERPL-MCNC: 1.3 MG/DL (ref 0.5–1.4)
CREAT SERPL-MCNC: 1.3 MG/DL (ref 0.5–1.4)
DIFFERENTIAL METHOD BLD: ABNORMAL
EOSINOPHIL # BLD AUTO: 0.2 K/UL (ref 0–0.5)
EOSINOPHIL NFR BLD: 0.9 % (ref 0–8)
ERYTHROCYTE [DISTWIDTH] IN BLOOD BY AUTOMATED COUNT: 13.2 % (ref 11.5–14.5)
EST. GFR  (NO RACE VARIABLE): 43.7 ML/MIN/1.73 M^2
GLUCOSE SERPL-MCNC: 154 MG/DL (ref 70–110)
HCT VFR BLD AUTO: 47.8 % (ref 37–48.5)
HGB BLD-MCNC: 15.5 G/DL (ref 12–16)
IMM GRANULOCYTES # BLD AUTO: 0.07 K/UL (ref 0–0.04)
IMM GRANULOCYTES NFR BLD AUTO: 0.4 % (ref 0–0.5)
INFLUENZA A, MOLECULAR: NEGATIVE
INFLUENZA B, MOLECULAR: NEGATIVE
LACTATE SERPL-SCNC: 1.1 MMOL/L (ref 0.5–1.9)
LDH SERPL L TO P-CCNC: 2.9 MMOL/L (ref 0.5–2.2)
LIPASE SERPL-CCNC: 40 U/L (ref 4–60)
LYMPHOCYTES # BLD AUTO: 3.7 K/UL (ref 1–4.8)
LYMPHOCYTES NFR BLD: 21.2 % (ref 18–48)
MCH RBC QN AUTO: 30 PG (ref 27–31)
MCHC RBC AUTO-ENTMCNC: 32.4 G/DL (ref 32–36)
MCV RBC AUTO: 93 FL (ref 82–98)
MONOCYTES # BLD AUTO: 0.9 K/UL (ref 0.3–1)
MONOCYTES NFR BLD: 5 % (ref 4–15)
NEUTROPHILS # BLD AUTO: 12.4 K/UL (ref 1.8–7.7)
NEUTROPHILS NFR BLD: 72.3 % (ref 38–73)
NRBC BLD-RTO: 0 /100 WBC
PLATELET # BLD AUTO: 327 K/UL (ref 150–450)
PMV BLD AUTO: 9.9 FL (ref 9.2–12.9)
POCT GLUCOSE: 169 MG/DL (ref 70–110)
POTASSIUM SERPL-SCNC: 4.4 MMOL/L (ref 3.5–5.1)
PROT SERPL-MCNC: 7.5 G/DL (ref 6–8.4)
RBC # BLD AUTO: 5.17 M/UL (ref 4–5.4)
SAMPLE: ABNORMAL
SAMPLE: NORMAL
SARS-COV-2 RDRP RESP QL NAA+PROBE: NEGATIVE
SODIUM SERPL-SCNC: 138 MMOL/L (ref 136–145)
SPECIMEN SOURCE: NORMAL
WBC # BLD AUTO: 17.24 K/UL (ref 3.9–12.7)

## 2024-12-29 PROCEDURE — 93005 ELECTROCARDIOGRAM TRACING: CPT | Performed by: INTERNAL MEDICINE

## 2024-12-29 PROCEDURE — 36415 COLL VENOUS BLD VENIPUNCTURE: CPT | Performed by: NURSE PRACTITIONER

## 2024-12-29 PROCEDURE — 25000003 PHARM REV CODE 250: Performed by: NURSE PRACTITIONER

## 2024-12-29 PROCEDURE — 25000003 PHARM REV CODE 250

## 2024-12-29 PROCEDURE — 93010 ELECTROCARDIOGRAM REPORT: CPT | Mod: ,,, | Performed by: INTERNAL MEDICINE

## 2024-12-29 PROCEDURE — 25000003 PHARM REV CODE 250: Performed by: HOSPITALIST

## 2024-12-29 PROCEDURE — 87635 SARS-COV-2 COVID-19 AMP PRB: CPT | Performed by: STUDENT IN AN ORGANIZED HEALTH CARE EDUCATION/TRAINING PROGRAM

## 2024-12-29 PROCEDURE — 63600175 PHARM REV CODE 636 W HCPCS: Performed by: NURSE PRACTITIONER

## 2024-12-29 PROCEDURE — 63600175 PHARM REV CODE 636 W HCPCS: Mod: JZ,JG | Performed by: STUDENT IN AN ORGANIZED HEALTH CARE EDUCATION/TRAINING PROGRAM

## 2024-12-29 PROCEDURE — 87502 INFLUENZA DNA AMP PROBE: CPT | Performed by: NURSE PRACTITIONER

## 2024-12-29 PROCEDURE — 83605 ASSAY OF LACTIC ACID: CPT | Performed by: NURSE PRACTITIONER

## 2024-12-29 PROCEDURE — 85025 COMPLETE CBC W/AUTO DIFF WBC: CPT | Performed by: NURSE PRACTITIONER

## 2024-12-29 PROCEDURE — 12000002 HC ACUTE/MED SURGE SEMI-PRIVATE ROOM

## 2024-12-29 PROCEDURE — 25500020 PHARM REV CODE 255: Performed by: STUDENT IN AN ORGANIZED HEALTH CARE EDUCATION/TRAINING PROGRAM

## 2024-12-29 PROCEDURE — 80053 COMPREHEN METABOLIC PANEL: CPT | Performed by: NURSE PRACTITIONER

## 2024-12-29 PROCEDURE — 27000207 HC ISOLATION

## 2024-12-29 PROCEDURE — 87040 BLOOD CULTURE FOR BACTERIA: CPT | Mod: 59 | Performed by: NURSE PRACTITIONER

## 2024-12-29 PROCEDURE — 83690 ASSAY OF LIPASE: CPT | Performed by: NURSE PRACTITIONER

## 2024-12-29 RX ORDER — SODIUM,POTASSIUM PHOSPHATES 280-250MG
2 POWDER IN PACKET (EA) ORAL
Status: DISCONTINUED | OUTPATIENT
Start: 2024-12-29 | End: 2024-12-31 | Stop reason: HOSPADM

## 2024-12-29 RX ORDER — LEVOFLOXACIN 5 MG/ML
750 INJECTION, SOLUTION INTRAVENOUS
Status: COMPLETED | OUTPATIENT
Start: 2024-12-29 | End: 2024-12-29

## 2024-12-29 RX ORDER — LANOLIN ALCOHOL/MO/W.PET/CERES
800 CREAM (GRAM) TOPICAL
Status: DISCONTINUED | OUTPATIENT
Start: 2024-12-29 | End: 2024-12-31 | Stop reason: HOSPADM

## 2024-12-29 RX ORDER — MUPIROCIN 20 MG/G
OINTMENT TOPICAL 2 TIMES DAILY
Status: DISCONTINUED | OUTPATIENT
Start: 2024-12-29 | End: 2024-12-31 | Stop reason: HOSPADM

## 2024-12-29 RX ORDER — METRONIDAZOLE 500 MG/100ML
500 INJECTION, SOLUTION INTRAVENOUS
Status: DISCONTINUED | OUTPATIENT
Start: 2024-12-29 | End: 2024-12-30

## 2024-12-29 RX ORDER — TRAZODONE HYDROCHLORIDE 50 MG/1
50 TABLET ORAL NIGHTLY PRN
Status: DISCONTINUED | OUTPATIENT
Start: 2024-12-29 | End: 2024-12-31 | Stop reason: HOSPADM

## 2024-12-29 RX ORDER — ONDANSETRON HYDROCHLORIDE 2 MG/ML
4 INJECTION, SOLUTION INTRAVENOUS
Status: COMPLETED | OUTPATIENT
Start: 2024-12-29 | End: 2024-12-29

## 2024-12-29 RX ORDER — HYDROCODONE BITARTRATE AND ACETAMINOPHEN 5; 325 MG/1; MG/1
1 TABLET ORAL EVERY 6 HOURS PRN
Status: DISCONTINUED | OUTPATIENT
Start: 2024-12-29 | End: 2024-12-31 | Stop reason: HOSPADM

## 2024-12-29 RX ORDER — IBUPROFEN 200 MG
24 TABLET ORAL
Status: DISCONTINUED | OUTPATIENT
Start: 2024-12-29 | End: 2024-12-31 | Stop reason: HOSPADM

## 2024-12-29 RX ORDER — GLUCAGON 1 MG
1 KIT INJECTION
Status: DISCONTINUED | OUTPATIENT
Start: 2024-12-29 | End: 2024-12-31 | Stop reason: HOSPADM

## 2024-12-29 RX ORDER — ENOXAPARIN SODIUM 100 MG/ML
40 INJECTION SUBCUTANEOUS EVERY 24 HOURS
Status: DISCONTINUED | OUTPATIENT
Start: 2024-12-30 | End: 2024-12-30

## 2024-12-29 RX ORDER — LEVOFLOXACIN 5 MG/ML
750 INJECTION, SOLUTION INTRAVENOUS
Status: DISCONTINUED | OUTPATIENT
Start: 2024-12-31 | End: 2024-12-29

## 2024-12-29 RX ORDER — MORPHINE SULFATE 4 MG/ML
4 INJECTION, SOLUTION INTRAMUSCULAR; INTRAVENOUS
Status: COMPLETED | OUTPATIENT
Start: 2024-12-29 | End: 2024-12-29

## 2024-12-29 RX ORDER — NALOXONE HCL 0.4 MG/ML
0.02 VIAL (ML) INJECTION
Status: DISCONTINUED | OUTPATIENT
Start: 2024-12-29 | End: 2024-12-31 | Stop reason: HOSPADM

## 2024-12-29 RX ORDER — IBUPROFEN 200 MG
16 TABLET ORAL
Status: DISCONTINUED | OUTPATIENT
Start: 2024-12-29 | End: 2024-12-31 | Stop reason: HOSPADM

## 2024-12-29 RX ORDER — ATORVASTATIN CALCIUM 40 MG/1
80 TABLET, FILM COATED ORAL NIGHTLY
Status: DISCONTINUED | OUTPATIENT
Start: 2024-12-29 | End: 2024-12-31 | Stop reason: HOSPADM

## 2024-12-29 RX ORDER — TALC
6 POWDER (GRAM) TOPICAL NIGHTLY PRN
Status: DISCONTINUED | OUTPATIENT
Start: 2024-12-29 | End: 2024-12-29

## 2024-12-29 RX ORDER — ONDANSETRON HYDROCHLORIDE 2 MG/ML
4 INJECTION, SOLUTION INTRAVENOUS EVERY 6 HOURS PRN
Status: DISCONTINUED | OUTPATIENT
Start: 2024-12-29 | End: 2024-12-31 | Stop reason: HOSPADM

## 2024-12-29 RX ORDER — ACETAMINOPHEN 325 MG/1
650 TABLET ORAL EVERY 4 HOURS PRN
Status: DISCONTINUED | OUTPATIENT
Start: 2024-12-29 | End: 2024-12-31 | Stop reason: HOSPADM

## 2024-12-29 RX ORDER — MELATONIN 10 MG
1 TABLET, SUBLINGUAL SUBLINGUAL DAILY
Status: DISCONTINUED | OUTPATIENT
Start: 2024-12-30 | End: 2024-12-31 | Stop reason: HOSPADM

## 2024-12-29 RX ORDER — INSULIN ASPART 100 [IU]/ML
0-10 INJECTION, SOLUTION INTRAVENOUS; SUBCUTANEOUS
Status: DISCONTINUED | OUTPATIENT
Start: 2024-12-29 | End: 2024-12-31 | Stop reason: HOSPADM

## 2024-12-29 RX ORDER — SODIUM CHLORIDE 0.9 % (FLUSH) 0.9 %
10 SYRINGE (ML) INJECTION EVERY 12 HOURS PRN
Status: DISCONTINUED | OUTPATIENT
Start: 2024-12-29 | End: 2024-12-31 | Stop reason: HOSPADM

## 2024-12-29 RX ORDER — SODIUM CHLORIDE 9 MG/ML
INJECTION, SOLUTION INTRAVENOUS CONTINUOUS
Status: ACTIVE | OUTPATIENT
Start: 2024-12-29 | End: 2024-12-30

## 2024-12-29 RX ORDER — ALUMINUM HYDROXIDE, MAGNESIUM HYDROXIDE, AND SIMETHICONE 1200; 120; 1200 MG/30ML; MG/30ML; MG/30ML
30 SUSPENSION ORAL 4 TIMES DAILY PRN
Status: DISCONTINUED | OUTPATIENT
Start: 2024-12-29 | End: 2024-12-31 | Stop reason: HOSPADM

## 2024-12-29 RX ORDER — AMOXICILLIN 250 MG
1 CAPSULE ORAL 2 TIMES DAILY PRN
Status: DISCONTINUED | OUTPATIENT
Start: 2024-12-29 | End: 2024-12-31 | Stop reason: HOSPADM

## 2024-12-29 RX ORDER — ROSUVASTATIN CALCIUM 10 MG/1
20 TABLET, COATED ORAL NIGHTLY
COMMUNITY
Start: 2024-09-05

## 2024-12-29 RX ORDER — CIPROFLOXACIN 2 MG/ML
400 INJECTION, SOLUTION INTRAVENOUS
Status: DISCONTINUED | OUTPATIENT
Start: 2024-12-30 | End: 2024-12-31 | Stop reason: HOSPADM

## 2024-12-29 RX ORDER — ESZOPICLONE 2 MG/1
2 TABLET, FILM COATED ORAL NIGHTLY PRN
COMMUNITY

## 2024-12-29 RX ADMIN — MORPHINE SULFATE 4 MG: 4 INJECTION, SOLUTION INTRAMUSCULAR; INTRAVENOUS at 06:12

## 2024-12-29 RX ADMIN — ATORVASTATIN CALCIUM 80 MG: 40 TABLET, FILM COATED ORAL at 11:12

## 2024-12-29 RX ADMIN — LEVOFLOXACIN 750 MG: 750 INJECTION, SOLUTION INTRAVENOUS at 05:12

## 2024-12-29 RX ADMIN — ONDANSETRON 4 MG: 2 INJECTION INTRAMUSCULAR; INTRAVENOUS at 06:12

## 2024-12-29 RX ADMIN — ONDANSETRON 4 MG: 2 INJECTION INTRAMUSCULAR; INTRAVENOUS at 05:12

## 2024-12-29 RX ADMIN — IOHEXOL 100 ML: 350 INJECTION, SOLUTION INTRAVENOUS at 05:12

## 2024-12-29 RX ADMIN — SODIUM CHLORIDE 2382 ML: 9 INJECTION, SOLUTION INTRAVENOUS at 03:12

## 2024-12-29 RX ADMIN — SODIUM CHLORIDE: 9 INJECTION, SOLUTION INTRAVENOUS at 09:12

## 2024-12-29 RX ADMIN — SODIUM CHLORIDE 1000 ML: 9 INJECTION, SOLUTION INTRAVENOUS at 11:12

## 2024-12-29 RX ADMIN — METRONIDAZOLE 500 MG: 500 INJECTION, SOLUTION INTRAVENOUS at 11:12

## 2024-12-29 NOTE — FIRST PROVIDER EVALUATION
Emergency Department TeleTriage Encounter Note      CHIEF COMPLAINT    Chief Complaint   Patient presents with    Vomiting    Abdominal Pain       VITAL SIGNS   Initial Vitals [12/29/24 1421]   BP Pulse Resp Temp SpO2   (!) 77/56 102 18 97.8 °F (36.6 °C) 98 %      MAP       --            ALLERGIES    Review of patient's allergies indicates:   Allergen Reactions    Penicillins Anaphylaxis    Biaxin [clarithromycin] Itching    Macrodantin [nitrofurantoin macrocrystalline] Other (See Comments)     Made to feel ill       PROVIDER TRIAGE NOTE  73 year old female presents to the ER with complaints of nausea, vomiting, fatigue, and diffuse abdominal pain since yesterday. States hx of prediabetes. Unable to tollerate eating or drinking due to symptoms. No known fever. Hx of chronic cough and sinus congestion. No urinary complaints.     AAOx3, respirations even and non- labored, patient is hypotensive and tachycardic on arrival and nurse tech in room with pt instructed to bring patient to next available exam room and notify in house provider for reassessment, normal coloration of skin, sitting upright in triage chair.     ORDERS  Labs Reviewed - No data to display    ED Orders (720h ago, onward)      None              Virtual Visit Note: The provider triage portion of this emergency department evaluation and documentation was performed via [employer], a HIPAA-compliant telemedicine application, in concert with a tele-presenter in the room. A face to face patient evaluation with one of my colleagues will occur once the patient is placed in an emergency department room.      DISCLAIMER: This note was prepared with [employer] voice recognition transcription software. Garbled syntax, mangled pronouns, and other bizarre constructions may be attributed to that software system.

## 2024-12-29 NOTE — ED PROVIDER NOTES
Encounter Date: 12/29/2024       History     Chief Complaint   Patient presents with    Vomiting    Abdominal Pain     HPI    Patient is a 73-year-old female with history of prior diverticulitis and diverticular bleed presenting with 1 day of persistent abdominal pain.  Describes it as diffuse, severe, with the associated nausea and vomiting.  Reported a loose stool today without blood or melena.  Patient reports decreased appetite in his not kept any food down today.  Denies any associated dysuria.     Review of patient's allergies indicates:   Allergen Reactions    Penicillins Anaphylaxis    Biaxin [clarithromycin] Itching    Macrodantin [nitrofurantoin macrocrystalline] Other (See Comments)     Made to feel ill     Past Medical History:   Diagnosis Date    Cataract     Cataract of both eyes 1/26/2021    Patient would like to be referred to an [facility] physician    Diverticulitis     Encounter for blood transfusion     Hemorrhage     from rectum    Hepatitis A     High cholesterol     Hypertension      Past Surgical History:   Procedure Laterality Date    APPENDECTOMY      CATARACT EXTRACTION W/  INTRAOCULAR LENS IMPLANT Right 5/5/2021    Procedure: CEIOL OD;  Surgeon: Hardik Martinez MD;  Location: [facility] OR;  Service: Ophthalmology;  Laterality: Right;    CATARACT EXTRACTION W/  INTRAOCULAR LENS IMPLANT Left 6/30/2021    Procedure: CEIOL OS;  Surgeon: Hardik Martinez MD;  Location: [facility] OR;  Service: Ophthalmology;  Laterality: Left;    COLONOSCOPY      COLONOSCOPY N/A 7/31/2022    Procedure: COLONOSCOPY;  Surgeon: Mireille Mariano MD;  Location: [facility];  Service: Endoscopy;  Laterality: N/A;    ESOPHAGOGASTRODUODENOSCOPY N/A 7/30/2022    Procedure: EGD (ESOPHAGOGASTRODUODENOSCOPY);  Surgeon: Mireille Mariano MD;  Location: [facility];  Service: Endoscopy;  Laterality: N/A;    EYE SURGERY      HYSTERECTOMY      JOINT REPLACEMENT      KNEE SURGERY  05/01/2019    Right TKA    TONSILLECTOMY      TUBAL LIGATION        Family History   Problem Relation Name Age of Onset    Psoriasis Child      Psoriasis Child      Psoriasis Child      Lupus Neg Hx      Melanoma Neg Hx       Social History     Tobacco Use    Smoking status: Former     Current packs/day: 1.00     Average packs/day: 1 pack/day for 30.0 years (30.0 ttl pk-yrs)     Types: Cigarettes    Smokeless tobacco: Never   Substance Use Topics    Alcohol use: No    Drug use: No     Review of Systems    As noted above    Physical Exam     Initial Vitals [12/29/24 1421]   BP Pulse Resp Temp SpO2   (!) 77/56 102 18 97.8 °F (36.6 °C) 98 %      MAP       --         Physical Exam    Constitutional: She appears well-developed and well-nourished. She is diaphoretic. No distress.   HENT:   Head: Normocephalic and atraumatic.   Eyes: Conjunctivae are normal.   Cardiovascular:  Normal rate.           No murmur heard.  Pulmonary/Chest: Breath sounds normal. No respiratory distress. She has no wheezes. She has no rhonchi. She has no rales.   Abdominal: Abdomen is soft. There is abdominal tenderness.   Diffuse tenderness, seems slightly worse in the epigastric and periumbilical space.  No rebound or guarding. There is no rebound and no guarding.   Musculoskeletal:         General: No edema. Normal range of motion.     Neurological: She is alert and oriented to person, place, and time.         ED Course   Procedures  Labs Reviewed   CBC W/ AUTO DIFFERENTIAL - Abnormal       Result Value    WBC 17.24 (*)     RBC 5.17      Hemoglobin 15.5      Hematocrit 47.8      MCV 93      MCH 30.0      MCHC 32.4      RDW 13.2      Platelets 327      MPV 9.9      Immature Granulocytes 0.4      Gran # (ANC) 12.4 (*)     Immature Grans (Abs) 0.07 (*)     Lymph # 3.7      Mono # 0.9      Eos # 0.2      Baso # 0.04      nRBC 0      Gran % 72.3      Lymph % 21.2      Mono % 5.0      Eosinophil % 0.9      Basophil % 0.2      Differential Method Automated     COMPREHENSIVE METABOLIC PANEL - Abnormal    Sodium 138       Potassium 4.4      Chloride 103      CO2 24      Glucose 154 (*)     BUN 22      Creatinine 1.3      Calcium 9.4      Total Protein 7.5      Albumin 4.4      Total Bilirubin 1.0      Alkaline Phosphatase 98      AST 15      ALT 13      eGFR 43.7 (*)     Anion Gap 11     POCT GLUCOSE - Abnormal    POCT Glucose 169 (*)    ISTAT LACTATE - Abnormal    POC Lactate 2.90 (*)     Sample VENOUS     CULTURE, BLOOD   CULTURE, BLOOD   CULTURE, STOOL   CLOSTRIDIUM DIFFICILE   LIPASE    Lipase 40     INFLUENZA A AND B ANTIGEN    Influenza A, Molecular Negative      Influenza B, Molecular Negative      Flu A & B Source Nasal swab      Narrative:     Specimen Source->Nasopharyngeal Swab   SARS-COV-2 RNA AMPLIFICATION, QUAL    SARS-CoV-2 RNA, Amplification, Qual Negative     URINALYSIS, REFLEX TO URINE CULTURE   LACTIC ACID, PLASMA   WBC, STOOL   GASTROINTESTINAL PATHOGENS PANEL, PCR   POCT GLUCOSE, HAND-HELD DEVICE   ISTAT CREATININE    POC Creatinine 1.3      Sample VENOUS     POCT LACTATE   POCT GLUCOSE MONITORING CONTINUOUS   POCT CREATININE        ECG Results              EKG 12-lead (In process)        Collection Time Result Time QRS Duration [facility] QTC Calculation    12/29/24 15:24:57 12/29/24 15:35:24 68 440                     In process by Interface, Lab In [facility] (12/29/24 15:35:31)                   Narrative:    Test Reason : R53.83,    Vent. Rate :  92 BPM     Atrial Rate :  92 BPM     P-R Int : 156 ms          QRS Dur :  68 ms      QT Int : 356 ms       P-R-T Axes :  45 209  54 degrees    QTcB Int : 440 ms    Normal sinus rhythm  Right superior axis deviation  Possible Right ventricular hypertrophy  Abnormal ECG  No previous ECGs available    Referred By: AAAREFERRAL SELF           Confirmed By:                                   Imaging Results              CT Abdomen Pelvis With IV Contrast NO Oral Contrast (Final result)  Result time 12/29/24 17:55:09      Final result by Danny Rothman MD (12/29/24  17:55:09)                   Impression:      Multiple loops of small bowel with abnormal circumferential wall thickening and adjacent mesenteric edema/fat stranding.  Findings are suggestive of a nonspecific enteritis including infectious, inflammatory, or ischemic etiologies.  Clinical correlation is advised.    Small volume of free fluid within the abdomen and pelvis, likely reactive.    Hepatic steatosis.    Colonic diverticulosis without evidence of acute diverticulitis.    Additional findings as above.      Electronically signed by: Danny Rothman MD  Date:    12/29/2024  Time:    17:55               Narrative:    EXAMINATION:  CT ABDOMEN PELVIS WITH IV CONTRAST    CLINICAL HISTORY:  Abdominal pain, acute, nonlocalized;    TECHNIQUE:  Low dose axial images, sagittal and coronal reformations were obtained from the lung bases to the pubic symphysis following the IV administration of 100 mL of Omnipaque 350 .  Oral contrast was not given.    COMPARISON:  CTA GI bleed 07/30/2022    FINDINGS:  There are scattered bandlike opacities at the lung bases suggesting atelectasis or scarring.  No significant pleural fluid.  The visualized portions of the heart appear normal.    The liver demonstrates diffuse decreased attenuation of the hepatic parenchyma suggesting hepatic steatosis.  The portal vein is patent.  The gallbladder shows no evidence of stones or pericholecystic fluid.  There is no intra-or extrahepatic biliary ductal dilatation.    There is a hiatal hernia present.  The stomach otherwise appears within normal limits.  The spleen, pancreas, and left adrenal gland are unremarkable.  There is a stable 1.8 cm right adrenal nodule containing macroscopic fat and calcification suggestive of a myelolipoma.    The kidneys are normal in size and location and enhance symmetrically.  There is no hydronephrosis.  There are small bilateral parapelvic cysts.  The urinary bladder is unremarkable. Uterus appears to be  surgically absent.    The abdominal aorta is normal in course and caliber with moderate calcified and noncalcified atherosclerosis along its course.  The major branch vessels including the visualized SMA appear to be grossly patent.  There is no retroperitoneal hematoma.    There are multiple loops of small bowel with abnormal circumferential wall thickening with adjacent mesenteric edema and fat stranding.  Findings are suggestive of a nonspecific enteritis.  There is moderate volume of retained stool throughout the colon.  There are scattered colonic diverticula without evidence of acute diverticulitis.  There is a small volume of free fluid within the upper abdomen and the pelvis.  There is no free intraperitoneal air or portal venous gas identified.  The appendix is not definitively identified.  There are scattered shotty mesenteric and retroperitoneal lymph nodes.    Osseous structures demonstrate mild degenerative changes of the spine.  The extraperitoneal soft tissues are unremarkable.                                       X-Ray Chest AP Portable (Final result)  Result time 12/29/24 15:08:35      Final result by Nadege Senior MD (12/29/24 15:08:35)                   Impression:      No acute cardiopulmonary abnormality.      Electronically signed by: Nadege Senior  Date:    12/29/2024  Time:    15:08               Narrative:    EXAMINATION:  XR CHEST AP PORTABLE    CLINICAL HISTORY:  Sepsis;    FINDINGS:  Portable chest at 14:48 hours is compared to 02/24/2021 shows normal cardiomediastinal silhouette.    Lungs are clear. Pulmonary vasculature is normal. No acute osseous abnormality.                                       Medications   metronidazole IVPB 500 mg (has no administration in time range)   sodium chloride 0.9% flush 10 mL (has no administration in time range)   melatonin tablet 6 mg (has no administration in time range)   ondansetron injection 4 mg (has no administration in time range)    senna-docusate 8.6-50 mg per tablet 1 tablet (has no administration in time range)   aluminum-magnesium hydroxide-simethicone 200-200-20 mg/5 mL suspension 30 mL (has no administration in time range)   acetaminophen tablet 650 mg (has no administration in time range)   HYDROcodone-acetaminophen 5-325 mg per tablet 1 tablet (has no administration in time range)   naloxone 0.4 mg/mL injection 0.02 mg (has no administration in time range)   potassium bicarbonate disintegrating tablet 50 mEq (has no administration in time range)   potassium bicarbonate disintegrating tablet 35 mEq (has no administration in time range)   potassium bicarbonate disintegrating tablet 60 mEq (has no administration in time range)   magnesium oxide tablet 800 mg (has no administration in time range)   magnesium oxide tablet 800 mg (has no administration in time range)   potassium, sodium phosphates 280-160-250 mg packet 2 packet (has no administration in time range)   potassium, sodium phosphates 280-160-250 mg packet 2 packet (has no administration in time range)   potassium, sodium phosphates 280-160-250 mg packet 2 packet (has no administration in time range)   glucose chewable tablet 16 g (has no administration in time range)   glucose chewable tablet 24 g (has no administration in time range)   dextrose 50% injection 12.5 g (has no administration in time range)   dextrose 50% injection 25 g (has no administration in time range)   glucagon (human recombinant) injection 1 mg (has no administration in time range)   enoxaparin injection 40 mg (has no administration in time range)   insulin aspart U-100 pen 0-10 Units (has no administration in time range)   0.9% NaCl infusion (has no administration in time range)   calcium-vitamin D3 500 mg-5 mcg (200 unit) per tablet 1 tablet (has no administration in time range)   atorvastatin tablet 80 mg (has no administration in time range)   sodium chloride 0.9% bolus 1,000 mL 1,000 mL (has no  administration in time range)   sodium chloride 0.9% bolus 2,382 mL 2,382 mL (2,382 mLs Intravenous New Bag 12/29/24 1535)   ondansetron injection 4 mg (4 mg Intravenous Given 12/29/24 1749)   levoFLOXacin 750 mg/150 mL IVPB 750 mg (750 mg Intravenous New Bag 12/29/24 1748)   iohexoL (OMNIPAQUE 350) injection 100 mL (100 mLs Intravenous Given 12/29/24 1739)   ondansetron injection 4 mg (4 mg Intravenous Given 12/29/24 1815)   morphine injection 4 mg (4 mg Intravenous Given 12/29/24 1815)     Medical Decision Making  73-year-old female with history of colitis and diverticular bleed presenting with 12 hours of severe abdominal pain.  Vital signs notable for hypotension with a blood pressure of 77/56.  Heart rate 102.  On exam patient is diaphoretic and nauseous.  There is diffuse tenderness in his worse in the periumbilical space.  Differential includes colitis, abscess, mesenteric ischemia, pancreatitis, cholecystitis or other gallbladder pathology, or bowel obstruction.  Labs reveal [place] count of 444[address] and a lactic acid of 2.9..  Patient with mild hyperglycemia.  Repeat exam with persistent abdominal tenderness.  CT scan of the abdomen with multiple loops of small bowel with abnormal circumferential thickening with adjacent mesenteric edema suggestive of enteritis or possible bowel ischemia.  On reassessment pain and blood pressure have improved.  Patient was treated with Levaquin and Flagyl.  We will admit patient for further observation and management.    Leoncio Tamayo MD  Emergency Medicine      Amount and/or Complexity of Data Reviewed  Labs:  Decision-making details documented in ED Course.  Radiology: ordered.    Risk  Prescription drug management.  Decision regarding hospitalization.               ED Course as of 12/29/24 2121   Sun Dec 29, 2024   1624 WBC(!): 17.24 [KH]   1625 POC Lactate(!): 2.90 [KH]      ED Course User Index  [KH] Leoncio Tamayo MD                           Clinical Impression:  Final  diagnoses:  [Z13.6] Screening for cardiovascular condition  [R53.83] Fatigue  [A41.9, R65.20] Severe sepsis          ED Disposition Condition    Admit Stable                Leoncio Tamayo MD  12/29/24 8233

## 2024-12-30 LAB
ANION GAP SERPL CALC-SCNC: 5 MMOL/L (ref 8–16)
APTT PPP: 26.8 SEC (ref 21–32)
BASOPHILS # BLD AUTO: 0.02 K/UL (ref 0–0.2)
BASOPHILS NFR BLD: 0.2 % (ref 0–1.9)
BILIRUB UR QL STRIP: NEGATIVE
BUN SERPL-MCNC: 18 MG/DL (ref 8–23)
CALCIUM SERPL-MCNC: 8.4 MG/DL (ref 8.7–10.5)
CHLORIDE SERPL-SCNC: 109 MMOL/L (ref 95–110)
CLARITY UR: CLEAR
CO2 SERPL-SCNC: 25 MMOL/L (ref 23–29)
COLOR UR: COLORLESS
CREAT SERPL-MCNC: 1 MG/DL (ref 0.5–1.4)
DIFFERENTIAL METHOD BLD: ABNORMAL
EOSINOPHIL # BLD AUTO: 0.2 K/UL (ref 0–0.5)
EOSINOPHIL NFR BLD: 1.8 % (ref 0–8)
ERYTHROCYTE [DISTWIDTH] IN BLOOD BY AUTOMATED COUNT: 13.5 % (ref 11.5–14.5)
EST. GFR  (NO RACE VARIABLE): 59.9 ML/MIN/1.73 M^2
GLUCOSE SERPL-MCNC: 100 MG/DL (ref 70–110)
GLUCOSE UR QL STRIP: NEGATIVE
HCT VFR BLD AUTO: 33.7 % (ref 37–48.5)
HGB BLD-MCNC: 10.6 G/DL (ref 12–16)
HGB UR QL STRIP: NEGATIVE
IMM GRANULOCYTES # BLD AUTO: 0.04 K/UL (ref 0–0.04)
IMM GRANULOCYTES NFR BLD AUTO: 0.4 % (ref 0–0.5)
INR PPP: 1 (ref 0.8–1.2)
KETONES UR QL STRIP: NEGATIVE
LACTATE SERPL-SCNC: 1 MMOL/L (ref 0.5–1.9)
LEUKOCYTE ESTERASE UR QL STRIP: NEGATIVE
LYMPHOCYTES # BLD AUTO: 2.9 K/UL (ref 1–4.8)
LYMPHOCYTES NFR BLD: 29.8 % (ref 18–48)
MAGNESIUM SERPL-MCNC: 1.9 MG/DL (ref 1.6–2.6)
MCH RBC QN AUTO: 29 PG (ref 27–31)
MCHC RBC AUTO-ENTMCNC: 31.5 G/DL (ref 32–36)
MCV RBC AUTO: 92 FL (ref 82–98)
MONOCYTES # BLD AUTO: 0.7 K/UL (ref 0.3–1)
MONOCYTES NFR BLD: 7.4 % (ref 4–15)
NEUTROPHILS # BLD AUTO: 6 K/UL (ref 1.8–7.7)
NEUTROPHILS NFR BLD: 60.4 % (ref 38–73)
NITRITE UR QL STRIP: NEGATIVE
NRBC BLD-RTO: 0 /100 WBC
PH UR STRIP: 5 [PH] (ref 5–8)
PHOSPHATE SERPL-MCNC: 3.5 MG/DL (ref 2.7–4.5)
PLATELET # BLD AUTO: 206 K/UL (ref 150–450)
PLATELET BLD QL SMEAR: ABNORMAL
PMV BLD AUTO: 9.9 FL (ref 9.2–12.9)
POCT GLUCOSE: 104 MG/DL (ref 70–110)
POCT GLUCOSE: 120 MG/DL (ref 70–110)
POCT GLUCOSE: 165 MG/DL (ref 70–110)
POCT GLUCOSE: 93 MG/DL (ref 70–110)
POCT GLUCOSE: 99 MG/DL (ref 70–110)
POTASSIUM SERPL-SCNC: 4.3 MMOL/L (ref 3.5–5.1)
PROT UR QL STRIP: NEGATIVE
PROTHROMBIN TIME: 11.4 SEC (ref 9–12.5)
RBC # BLD AUTO: 3.65 M/UL (ref 4–5.4)
SODIUM SERPL-SCNC: 139 MMOL/L (ref 136–145)
SP GR UR STRIP: 1.02 (ref 1–1.03)
URN SPEC COLLECT METH UR: ABNORMAL
UROBILINOGEN UR STRIP-ACNC: NEGATIVE EU/DL
WBC # BLD AUTO: 9.87 K/UL (ref 3.9–12.7)

## 2024-12-30 PROCEDURE — 83605 ASSAY OF LACTIC ACID: CPT

## 2024-12-30 PROCEDURE — 25000003 PHARM REV CODE 250: Performed by: HOSPITALIST

## 2024-12-30 PROCEDURE — 99900035 HC TECH TIME PER 15 MIN (STAT)

## 2024-12-30 PROCEDURE — 12000002 HC ACUTE/MED SURGE SEMI-PRIVATE ROOM

## 2024-12-30 PROCEDURE — 85610 PROTHROMBIN TIME: CPT

## 2024-12-30 PROCEDURE — 99900031 HC PATIENT EDUCATION (STAT)

## 2024-12-30 PROCEDURE — 25000003 PHARM REV CODE 250

## 2024-12-30 PROCEDURE — 84100 ASSAY OF PHOSPHORUS: CPT

## 2024-12-30 PROCEDURE — 36415 COLL VENOUS BLD VENIPUNCTURE: CPT

## 2024-12-30 PROCEDURE — 63600175 PHARM REV CODE 636 W HCPCS: Mod: JZ,JG | Performed by: HOSPITALIST

## 2024-12-30 PROCEDURE — 85025 COMPLETE CBC W/AUTO DIFF WBC: CPT

## 2024-12-30 PROCEDURE — 85730 THROMBOPLASTIN TIME PARTIAL: CPT

## 2024-12-30 PROCEDURE — 81003 URINALYSIS AUTO W/O SCOPE: CPT | Performed by: NURSE PRACTITIONER

## 2024-12-30 PROCEDURE — 83735 ASSAY OF MAGNESIUM: CPT

## 2024-12-30 PROCEDURE — 25000003 PHARM REV CODE 250: Performed by: FAMILY MEDICINE

## 2024-12-30 PROCEDURE — 80048 BASIC METABOLIC PNL TOTAL CA: CPT

## 2024-12-30 RX ORDER — METRONIDAZOLE 500 MG/100ML
500 INJECTION, SOLUTION INTRAVENOUS
Status: DISCONTINUED | OUTPATIENT
Start: 2024-12-30 | End: 2024-12-31 | Stop reason: HOSPADM

## 2024-12-30 RX ORDER — MIDODRINE HYDROCHLORIDE 2.5 MG/1
5 TABLET ORAL
Status: DISCONTINUED | OUTPATIENT
Start: 2024-12-30 | End: 2024-12-31 | Stop reason: HOSPADM

## 2024-12-30 RX ORDER — SODIUM CHLORIDE 9 MG/ML
INJECTION, SOLUTION INTRAVENOUS CONTINUOUS
Status: DISCONTINUED | OUTPATIENT
Start: 2024-12-30 | End: 2024-12-31 | Stop reason: HOSPADM

## 2024-12-30 RX ADMIN — MIDODRINE HYDROCHLORIDE 5 MG: 2.5 TABLET ORAL at 06:12

## 2024-12-30 RX ADMIN — SODIUM CHLORIDE: 9 INJECTION, SOLUTION INTRAVENOUS at 01:12

## 2024-12-30 RX ADMIN — SODIUM CHLORIDE: 9 INJECTION, SOLUTION INTRAVENOUS at 11:12

## 2024-12-30 RX ADMIN — METRONIDAZOLE 500 MG: 500 INJECTION, SOLUTION INTRAVENOUS at 10:12

## 2024-12-30 RX ADMIN — MIDODRINE HYDROCHLORIDE 5 MG: 2.5 TABLET ORAL at 12:12

## 2024-12-30 RX ADMIN — METRONIDAZOLE 500 MG: 500 INJECTION, SOLUTION INTRAVENOUS at 04:12

## 2024-12-30 RX ADMIN — ATORVASTATIN CALCIUM 80 MG: 40 TABLET, FILM COATED ORAL at 08:12

## 2024-12-30 RX ADMIN — MUPIROCIN 1 G: 20 OINTMENT TOPICAL at 09:12

## 2024-12-30 RX ADMIN — METRONIDAZOLE 500 MG: 500 INJECTION, SOLUTION INTRAVENOUS at 06:12

## 2024-12-30 RX ADMIN — CIPROFLOXACIN 400 MG: 2 INJECTION, SOLUTION INTRAVENOUS at 09:12

## 2024-12-30 RX ADMIN — MUPIROCIN 1 G: 20 OINTMENT TOPICAL at 08:12

## 2024-12-30 NOTE — PROGRESS NOTES
"[facility]  Progress Note    Patient Name: Shantal Orourke  MRN: [id number]  Patient Class: IP- Inpatient   Admission Date: 12/29/2024  Length of Stay: 1 days  Attending Physician: Jillian Renner DO  Primary Care Provider: Elo Ervin MD        Subjective     Principal Problem:Severe sepsis        HPI:  Ms. Orourke is a 73 yr old female with a hx of diverticulosis, HTN, HLD, abdominal aortic atherosclerosis, GI bleed, GERD, heart murmur, overactive bladder, DM type 2, anemia, cataract, hepatitis A, diastolic dysfunction who presented to the ED with a chief complaint of 5/10 generalized intermittent abdominal pain that woke her from sleep last night described as bloating and pressure."She states that she has also been experiencing nausea, vomiting, and diarrhea with 3-4 episodes of nonbloody emesis and to nonbloody loose bowel movements.  She states that eating and drinking makes her symptoms worse as well as movement.  Her pain is nonradiating and she has tried no treatments PTA to ED for her symptoms.  She also endorses decreased appetite as well as mild shortness of breath, diaphoresis, lightheadedness, and dizziness upon arrival to ED today.  She thinks that this is due to her blood pressure being low.  She states that these symptoms have now resolved.  She states that she quit smoking tobacco about 10 years ago and denies alcohol and recreational drug use.  She denies fever, chills, chest pain, dysuria, hematuria, syncope.    Upon arrival to ED, patient afebrile, HR of 102, RR of 18, BP of 77/56, satting 98% on RA.  Workup in the ED revealed WBC of 17.24, GFR of 43.7, glucose of 154, POCT glucose of 169, POC lactate of 2.90.  CXR shows no acute cardiopulmonary abnormality.  CTAP with IV contrast shows multiple loops of small bowel with abnormal circumferential wall thickening and adjacent mesenteric edema/fat stranding.  Findings are suggestive of a nonspecific enteritis " including infectious, inflammatory, or ischemic etiologies.  Patient was given 500 mL LR bolus, levofloxacin 750 mg IV, metronidazole 500 mg IV, morphine 4 mg IV, ondansetron 4 mg IV x2, NS 30 ml/kg bolus.  Patient had positive response to IV fluids and blood pressure improved.  Discussed case with ED provider and patient will be admitted under hospital medicine services.    Overview/Hospital Course:  Patient is a 73 yr old female with a hx of diverticulosis, HTN, HLD, abdominal aortic atherosclerosis, GI bleed, GERD, heart murmur, overactive bladder, DM type 2, anemia, cataract, hepatitis A, diastolic dysfunction came in with abdominal pain and admitted for severe sepsis.  Imaging showed concern for enteritis and patient was started on IV antibiotics.  Patient said she did not have diarrhea just regular bowel movement.  Her abdominal pain has been completely resolved so diet was resumed.  GI was consulted by admitting team.  Patient was given fluid and started on midodrine for low blood pressure.    Interval History:  Patient said her abdominal pain has been resolved and she feels much better    Review of Systems   All other systems reviewed and are negative.    Objective:     Vital Signs (Most Recent):  Temp: 98.1 °F (36.7 °C) (12/30/24 1142)  Pulse: 70 (12/30/24 1142)  Resp: 18 (12/30/24 1142)  BP: 116/77 (12/30/24 1142)  SpO2: 97 % (12/30/24 1142) Vital Signs (24h Range):  Temp:  [98.1 °F (36.7 °C)-98.5 °F (36.9 °C)] 98.1 °F (36.7 °C)  Pulse:  [67-92] 70  Resp:  [16-18] 18  SpO2:  [95 %-99 %] 97 %  BP: ()/(58-77) 116/77     Weight: 79.4 kg (175 lb)  Body mass index is 29.12 kg/m².    Intake/Output Summary (Last 24 hours) at 12/30/2024 1504  Last data filed at 12/30/2024 1333  Gross per 24 hour   Intake 4579.14 ml   Output --   Net 4579.14 ml         Physical Exam  Cardiovascular:      Rate and Rhythm: Normal rate.      Pulses: Normal pulses.   Pulmonary:      Effort: Pulmonary effort is normal.    Abdominal:      General: Abdomen is flat. Bowel sounds are normal. There is no distension.   Neurological:      Mental Status: She is alert and oriented to person, place, and time.             Significant Labs: All pertinent labs within the past 24 hours have been reviewed.    Significant Imaging: I have reviewed all pertinent imaging results/findings within the past 24 hours.      Assessment and Plan     * Severe sepsis  Resolved  Imaging showed concern for enteritis and patient was started on IV antibiotics.    Her abdominal pain has been completely resolved so diet was resumed.   GI was consulted per admitting team  Patient was given fluid and started on midodrine for low blood pressure.    Type 2 diabetes mellitus  Last A1c reviewed-   Lab Results   Component Value Date    HGBA1C 6.1 (H) 09/16/2024     Most recent fingerstick glucose reviewed-   Recent Labs   Lab 12/29/24  1514   POCTGLUCOSE 169*     Current correctional scale  Medium  Maintain anti-hyperglycemic dose as follows-   Antihyperglycemics (From admission, onward)      Start     Stop Route Frequency Ordered    12/29/24 1938  insulin aspart U-100 pen 0-10 Units         -- SubQ Before meals & nightly PRN 12/29/24 1846          Hold Oral hypoglycemics while patient is in the hospital.    Enteritis  - See plan for severe sepsis      Diastolic dysfunction  - Hx noted      Heart murmur  - Appreciate on exam      Primary hypertension  Latest blood pressure and vitals reviewed-   Temp:  [97.8 °F (36.6 °C)]   Pulse:  []   Resp:  [17-18]   BP: ()/(56-74)   SpO2:  [96 %-99 %] .     - Not currently on home antihypertensives  - Initially presented hypotensive, responded well to IVF  - Will continue to monitor      VTE Risk Mitigation (From admission, onward)           Ordered     IP VTE HIGH RISK PATIENT  Once         12/29/24 1846     Place sequential compression device  Until discontinued         12/29/24 1846                    Discharge Planning    SHANTELL: 12/31/2024     Code Status: Full Code   Medical Readiness for Discharge Date:                            Jillian Renner DO  Department of Hospital Medicine   Critical access hospital

## 2024-12-30 NOTE — SUBJECTIVE & OBJECTIVE
Interval History:  Patient said her abdominal pain has been resolved and she feels much better    Review of Systems   All other systems reviewed and are negative.    Objective:     Vital Signs (Most Recent):  Temp: 98.1 °F (36.7 °C) (12/30/24 1142)  Pulse: 70 (12/30/24 1142)  Resp: 18 (12/30/24 1142)  BP: 116/77 (12/30/24 1142)  SpO2: 97 % (12/30/24 1142) Vital Signs (24h Range):  Temp:  [98.1 °F (36.7 °C)-98.5 °F (36.9 °C)] 98.1 °F (36.7 °C)  Pulse:  [67-92] 70  Resp:  [16-18] 18  SpO2:  [95 %-99 %] 97 %  BP: ()/(58-77) 116/77     Weight: 79.4 kg (175 lb)  Body mass index is 29.12 kg/m².    Intake/Output Summary (Last 24 hours) at 12/30/2024 1504  Last data filed at 12/30/2024 1333  Gross per 24 hour   Intake 4579.14 ml   Output --   Net 4579.14 ml         Physical Exam  Cardiovascular:      Rate and Rhythm: Normal rate.      Pulses: Normal pulses.   Pulmonary:      Effort: Pulmonary effort is normal.   Abdominal:      General: Abdomen is flat. Bowel sounds are normal. There is no distension.   Neurological:      Mental Status: She is alert and oriented to person, place, and time.             Significant Labs: All pertinent labs within the past 24 hours have been reviewed.    Significant Imaging: I have reviewed all pertinent imaging results/findings within the past 24 hours.

## 2024-12-30 NOTE — ASSESSMENT & PLAN NOTE
Last A1c reviewed-   Lab Results   Component Value Date    HGBA1C 6.1 (H) 09/16/2024     Most recent fingerstick glucose reviewed-   Recent Labs   Lab 12/29/24  1514   POCTGLUCOSE 169*     Current correctional scale  Medium  Maintain anti-hyperglycemic dose as follows-   Antihyperglycemics (From admission, onward)      Start     Stop Route Frequency Ordered    12/29/24 1938  insulin aspart U-100 pen 0-10 Units         -- SubQ Before meals & nightly PRN 12/29/24 1846          Hold Oral hypoglycemics while patient is in the hospital.

## 2024-12-30 NOTE — ASSESSMENT & PLAN NOTE
Resolved  Patient was started on IV antibiotics.    Her abdominal pain has been completely resolved so diet was resumed.   GI was consulted per admitting team  Patient was given fluid and started on midodrine for low blood pressure.

## 2024-12-30 NOTE — HOSPITAL COURSE
Patient is a 73 yr old female with a hx of diverticulosis, HTN, HLD, abdominal aortic atherosclerosis, GI bleed, GERD, heart murmur, overactive bladder, DM type 2, anemia, cataract, hepatitis A, diastolic dysfunction came in with abdominal pain and admitted for severe sepsis.  Imaging showed concern for enteritis and patient was started on IV antibiotics.  Patient said she did not have diarrhea just regular bowel movement.  Her abdominal pain has been completely resolved so diet was resumed.  GI was consulted by admitting team.  Patient was given fluid and started on midodrine for low blood pressure and recommended to hold if her systolic blood pressures greater than 130.  Patient was discharged with Ciprofloxacillin and metronidazole.  Recommended that patient follow-up with PCP

## 2024-12-30 NOTE — SUBJECTIVE & OBJECTIVE
Interval History:  Patient said her abdominal pain has been resolved and she feels much better    Review of Systems   All other systems reviewed and are negative.    Objective:     Vital Signs (Most Recent):  Temp: 98.1 °F (36.7 °C) (12/30/24 1142)  Pulse: 70 (12/30/24 1142)  Resp: 18 (12/30/24 1142)  BP: 116/77 (12/30/24 1142)  SpO2: 97 % (12/30/24 1142) Vital Signs (24h Range):  Temp:  [98.1 °F (36.7 °C)-98.5 °F (36.9 °C)] 98.1 °F (36.7 °C)  Pulse:  [67-92] 70  Resp:  [16-18] 18  SpO2:  [95 %-99 %] 97 %  BP: ()/(58-77) 116/77     Weight: 79.4 kg (175 lb)  Body mass index is 29.12 kg/m².    Intake/Output Summary (Last 24 hours) at 12/30/2024 1446  Last data filed at 12/30/2024 1333  Gross per 24 hour   Intake 4579.14 ml   Output --   Net 4579.14 ml         Physical Exam  Cardiovascular:      Rate and Rhythm: Normal rate.      Pulses: Normal pulses.   Pulmonary:      Effort: Pulmonary effort is normal.   Abdominal:      General: Abdomen is flat. Bowel sounds are normal. There is no distension.   Neurological:      Mental Status: She is alert and oriented to person, place, and time.             Significant Labs: All pertinent labs within the past 24 hours have been reviewed.    Significant Imaging: I have reviewed all pertinent imaging results/findings within the past 24 hours.

## 2024-12-30 NOTE — ASSESSMENT & PLAN NOTE
"This patient does have evidence of infective focus  My overall impression is sepsis.  Source: Abdominal  Antibiotics given-   Antibiotics (72h ago, onward)      Start     Stop Route Frequency Ordered    12/31/24 1730  levoFLOXacin 750 mg/150 mL IVPB 750 mg        Note to Pharmacy: Ht: 5' 5" (1.651 m)  Wt: 79.4 kg (175 lb)  Estimated Creatinine Clearance: 40.8 mL/min (based on SCr of 1.3 mg/dL).  Body mass index is 29.12 kg/m².    -- IV Every 48 hours (non-standard times) 12/29/24 1846    12/29/24 1615  metronidazole IVPB 500 mg         -- IV Every 8 hours (non-standard times) 12/29/24 1510    12/29/24 1515  levoFLOXacin 750 mg/150 mL IVPB 750 mg         12/30/24 0314 IV ED 1 Time 12/29/24 1510          Latest lactate reviewed-  Recent Labs   Lab 12/29/24  1518   POCLAC 2.90*     Organ dysfunction indicated by  N/A    Fluid challenge Actual Body weight- Patient will receive 30ml/kg actual body weight to calculate fluid bolus for treatment of septic shock.     Post- resuscitation assessment Yes Perfusion exam was performed within 6 hours of septic shock presentation after bolus shows Adequate tissue perfusion assessed by non-invasive monitoring       Will Not start Pressors- Levophed for MAP of 65  Source control achieved by:  Levofloxacin and metronidazole    - Initial lactic 2.90  - Blood cultures pending  - CXR negative  - UA pending  - COVID and flu negative  - CTAP with findings concerning for enteritis  - Stool studies pending  - Will treat empirically with levofloxacin and Flagyl  - PRN analgesia and symptomatic care  -  ml/hr for 10 hours, reassess need in the a.m.  - GI consulted, appreciate recs  "

## 2024-12-30 NOTE — SUBJECTIVE & OBJECTIVE
Past Medical History:   Diagnosis Date    Cataract     Cataract of both eyes 1/26/2021    Patient would like to be referred to an [facility] physician    Diverticulitis     Encounter for blood transfusion     Hemorrhage     from rectum    Hepatitis A     High cholesterol     Hypertension        Past Surgical History:   Procedure Laterality Date    APPENDECTOMY      CATARACT EXTRACTION W/  INTRAOCULAR LENS IMPLANT Right 5/5/2021    Procedure: CEIOL OD;  Surgeon: Hardik Martinez MD;  Location: [facility] OR;  Service: Ophthalmology;  Laterality: Right;    CATARACT EXTRACTION W/  INTRAOCULAR LENS IMPLANT Left 6/30/2021    Procedure: CEIOL OS;  Surgeon: Hardik Martinez MD;  Location: formerly [facility] OR;  Service: Ophthalmology;  Laterality: Left;    COLONOSCOPY      COLONOSCOPY N/A 7/31/2022    Procedure: COLONOSCOPY;  Surgeon: Mireille Mariano MD;  Location: [facility] ENDO;  Service: Endoscopy;  Laterality: N/A;    ESOPHAGOGASTRODUODENOSCOPY N/A 7/30/2022    Procedure: EGD (ESOPHAGOGASTRODUODENOSCOPY);  Surgeon: Mireille Mariano MD;  Location: [facility] ENDO;  Service: Endoscopy;  Laterality: N/A;    EYE SURGERY      HYSTERECTOMY      JOINT REPLACEMENT      KNEE SURGERY  05/01/2019    Right TKA    TONSILLECTOMY      TUBAL LIGATION         Review of patient's allergies indicates:   Allergen Reactions    Penicillins Anaphylaxis    Biaxin [clarithromycin] Itching    Macrodantin [nitrofurantoin macrocrystalline] Other (See Comments)     Made to feel ill       No current facility-administered medications on file prior to encounter.     Current Outpatient Medications on File Prior to Encounter   Medication Sig    calcium-vitamin D3 (OS-CHARLES 500 + D3) 500 mg-5 mcg (200 unit) per tablet Take 1 tablet by mouth Daily.    co-enzyme Q-10 30 mg capsule Take 30 mg by mouth once daily.    eszopiclone (LUNESTA) 2 MG Tab Take 2 mg by mouth nightly as needed (Insomnia).    metFORMIN (GLUCOPHAGE-XR) 500 MG ER 24hr tablet Take 500 mg by mouth before evening meal.     rosuvastatin (CRESTOR) 10 MG tablet Take 20 mg by mouth every evening.    [DISCONTINUED] albuterol (VENTOLIN HFA) 90 mcg/actuation inhaler Inhale 2 puffs into the lungs every 6 (six) hours as needed for Wheezing. Rescue    [DISCONTINUED] cetirizine (ZYRTEC) 10 MG tablet Take 1 tablet (10 mg total) by mouth once daily. (Patient not taking: Reported on 4/26/2022)    [DISCONTINUED] eszopiclone (LUNESTA) 3 mg Tab Take 3 mg by mouth.    [DISCONTINUED] fenofibrate (TRICOR) 145 MG tablet Take 1 tablet (145 mg total) by mouth once daily.    [DISCONTINUED] gemfibroziL (LOPID) 600 MG tablet Take 600 mg by mouth. (Patient not taking: Reported on 12/29/2024)    [DISCONTINUED] montelukast (SINGULAIR) 10 mg tablet Take 1 tablet (10 mg total) by mouth once daily.    [DISCONTINUED] oxybutynin (DITROPAN-XL) 5 MG TR24 Take 1 tablet by mouth once daily.    [DISCONTINUED] pantoprazole (PROTONIX) 40 MG tablet Take 1 tablet (40 mg total) by mouth once daily.    [DISCONTINUED] traZODone (DESYREL) 50 MG tablet Take 1 tablet (50 mg total) by mouth every evening.     Family History       Problem Relation (Age of Onset)    Psoriasis Child, Child, Child          Tobacco Use    Smoking status: Former     Current packs/day: 1.00     Average packs/day: 1 pack/day for 30.0 years (30.0 ttl pk-yrs)     Types: Cigarettes    Smokeless tobacco: Never   Substance and Sexual Activity    Alcohol use: No    Drug use: No    Sexual activity: Not on file     Review of Systems   Constitutional:  Positive for appetite change (decreased) and diaphoresis. Negative for chills and fever.   Respiratory:  Positive for shortness of breath (resolved).    Cardiovascular:  Negative for chest pain.   Gastrointestinal:  Positive for abdominal pain, diarrhea, nausea and vomiting.   Genitourinary:  Negative for dysuria and hematuria.   Neurological:  Positive for dizziness (resolved) and light-headedness (resolved). Negative for syncope.     Objective:     Vital Signs  (Most Recent):  Temp: 97.8 °F (36.6 °C) (12/29/24 1421)  Pulse: 75 (12/29/24 1700)  Resp: 18 (12/29/24 1421)  BP: 109/74 (12/29/24 1700)  SpO2: 98 % (12/29/24 1700) Vital Signs (24h Range):  Temp:  [97.8 °F (36.6 °C)] 97.8 °F (36.6 °C)  Pulse:  [] 75  Resp:  [18] 18  SpO2:  [96 %-99 %] 98 %  BP: ()/(56-74) 109/74     Weight: 79.4 kg (175 lb)  Body mass index is 29.12 kg/m².     Physical Exam  Vitals reviewed.   Constitutional:       General: She is awake. She is not in acute distress.     Appearance: Normal appearance. She is not ill-appearing or diaphoretic.   Cardiovascular:      Rate and Rhythm: Normal rate.      Heart sounds: Murmur heard.      No friction rub. No gallop.   Pulmonary:      Effort: Pulmonary effort is normal. No accessory muscle usage or respiratory distress.      Breath sounds: Normal breath sounds. No wheezing, rhonchi or rales.   Abdominal:      General: Bowel sounds are normal.      Palpations: Abdomen is soft.      Tenderness: There is no abdominal tenderness. There is no guarding or rebound.      Comments: No pain elicited on abdominal exam currently.  Patient tells me she feels better once receiving treatment.   Musculoskeletal:      Right lower leg: No edema.      Left lower leg: No edema.   Skin:     General: Skin is warm and dry.   Neurological:      Mental Status: She is alert and oriented to person, place, and time.   Psychiatric:         Behavior: Behavior is cooperative.                Significant Labs: All pertinent labs within the past 24 hours have been reviewed.  CBC:   Recent Labs   Lab 12/29/24  1525   WBC 17.24*   HGB 15.5   HCT 47.8        CMP:   Recent Labs   Lab 12/29/24  1525      K 4.4      CO2 24   *   BUN 22   CREATININE 1.3   CALCIUM 9.4   PROT 7.5   ALBUMIN 4.4   BILITOT 1.0   ALKPHOS 98   AST 15   ALT 13   ANIONGAP 11     Lipase:   Recent Labs   Lab 12/29/24  1525   LIPASE 40     POCT Glucose:   Recent Labs   Lab 12/29/24  1514    POCTGLUCOSE 169*       Significant Imaging:   Imaging Results              CT Abdomen Pelvis With IV Contrast NO Oral Contrast (Final result)  Result time 12/29/24 17:55:09      Final result by Danny Rothman MD (12/29/24 17:55:09)                   Impression:      Multiple loops of small bowel with abnormal circumferential wall thickening and adjacent mesenteric edema/fat stranding.  Findings are suggestive of a nonspecific enteritis including infectious, inflammatory, or ischemic etiologies.  Clinical correlation is advised.    Small volume of free fluid within the abdomen and pelvis, likely reactive.    Hepatic steatosis.    Colonic diverticulosis without evidence of acute diverticulitis.    Additional findings as above.      Electronically signed by: Danny Rothman MD  Date:    12/29/2024  Time:    17:55               Narrative:    EXAMINATION:  CT ABDOMEN PELVIS WITH IV CONTRAST    CLINICAL HISTORY:  Abdominal pain, acute, nonlocalized;    TECHNIQUE:  Low dose axial images, sagittal and coronal reformations were obtained from the lung bases to the pubic symphysis following the IV administration of 100 mL of Omnipaque 350 .  Oral contrast was not given.    COMPARISON:  CTA GI bleed 07/30/2022    FINDINGS:  There are scattered bandlike opacities at the lung bases suggesting atelectasis or scarring.  No significant pleural fluid.  The visualized portions of the heart appear normal.    The liver demonstrates diffuse decreased attenuation of the hepatic parenchyma suggesting hepatic steatosis.  The portal vein is patent.  The gallbladder shows no evidence of stones or pericholecystic fluid.  There is no intra-or extrahepatic biliary ductal dilatation.    There is a hiatal hernia present.  The stomach otherwise appears within normal limits.  The spleen, pancreas, and left adrenal gland are unremarkable.  There is a stable 1.8 cm right adrenal nodule containing macroscopic fat and calcification suggestive of  a myelolipoma.    The kidneys are normal in size and location and enhance symmetrically.  There is no hydronephrosis.  There are small bilateral parapelvic cysts.  The urinary bladder is unremarkable. Uterus appears to be surgically absent.    The abdominal aorta is normal in course and caliber with moderate calcified and noncalcified atherosclerosis along its course.  The major branch vessels including the visualized SMA appear to be grossly patent.  There is no retroperitoneal hematoma.    There are multiple loops of small bowel with abnormal circumferential wall thickening with adjacent mesenteric edema and fat stranding.  Findings are suggestive of a nonspecific enteritis.  There is moderate volume of retained stool throughout the colon.  There are scattered colonic diverticula without evidence of acute diverticulitis.  There is a small volume of free fluid within the upper abdomen and the pelvis.  There is no free intraperitoneal air or portal venous gas identified.  The appendix is not definitively identified.  There are scattered shotty mesenteric and retroperitoneal lymph nodes.    Osseous structures demonstrate mild degenerative changes of the spine.  The extraperitoneal soft tissues are unremarkable.                                       X-Ray Chest AP Portable (Final result)  Result time 12/29/24 15:08:35      Final result by Nadege Senior MD (12/29/24 15:08:35)                   Impression:      No acute cardiopulmonary abnormality.      Electronically signed by: Nadege Senior  Date:    12/29/2024  Time:    15:08               Narrative:    EXAMINATION:  XR CHEST AP PORTABLE    CLINICAL HISTORY:  Sepsis;    FINDINGS:  Portable chest at 14:48 hours is compared to 02/24/2021 shows normal cardiomediastinal silhouette.    Lungs are clear. Pulmonary vasculature is normal. No acute osseous abnormality.

## 2024-12-30 NOTE — HPI
"Ms. Orourke is a 73 yr old female with a hx of diverticulosis, HTN, HLD, abdominal aortic atherosclerosis, GI bleed, GERD, heart murmur, overactive bladder, DM type 2, anemia, cataract, hepatitis A, diastolic dysfunction who presented to the ED with a chief complaint of 5/10 generalized intermittent abdominal pain that woke her from sleep last night described as bloating and pressure."She states that she has also been experiencing nausea, vomiting, and diarrhea with 3-4 episodes of nonbloody emesis and to nonbloody loose bowel movements.  She states that eating and drinking makes her symptoms worse as well as movement.  Her pain is nonradiating and she has tried no treatments PTA to ED for her symptoms.  She also endorses decreased appetite as well as mild shortness of breath, diaphoresis, lightheadedness, and dizziness upon arrival to ED today.  She thinks that this is due to her blood pressure being low.  She states that these symptoms have now resolved.  She states that she quit smoking tobacco about 10 years ago and denies alcohol and recreational drug use.  She denies fever, chills, chest pain, dysuria, hematuria, syncope.    Upon arrival to ED, patient afebrile, HR of 102, RR of 18, BP of 77/56, satting 98% on RA.  Workup in the ED revealed WBC of 17.24, GFR of 43.7, glucose of 154, POCT glucose of 169, POC lactate of 2.90.  CXR shows no acute cardiopulmonary abnormality.  CTAP with IV contrast shows multiple loops of small bowel with abnormal circumferential wall thickening and adjacent mesenteric edema/fat stranding.  Findings are suggestive of a nonspecific enteritis including infectious, inflammatory, or ischemic etiologies.  Patient was given 500 mL LR bolus, levofloxacin 750 mg IV, metronidazole 500 mg IV, morphine 4 mg IV, ondansetron 4 mg IV x2, NS 30 ml/kg bolus.  Patient had positive response to IV fluids and blood pressure improved.  Discussed case with ED provider and patient will be admitted under " hospital medicine services.

## 2024-12-30 NOTE — PLAN OF CARE
Problem: Adult Inpatient Plan of Care  Goal: Plan of Care Review  Outcome: Progressing  Goal: Patient-Specific Goal (Individualized)  Outcome: Progressing  Goal: Absence of Hospital-Acquired Illness or Injury  Outcome: Progressing  Goal: Optimal Comfort and Wellbeing  Outcome: Progressing  Goal: Readiness for Transition of Care  Outcome: Progressing     Problem: Diabetes Comorbidity  Goal: Blood Glucose Level Within Targeted Range  Outcome: Progressing     Problem: Sepsis/Septic Shock  Goal: Optimal Coping  Outcome: Progressing  Goal: Absence of Bleeding  Outcome: Progressing  Goal: Blood Glucose Level Within Targeted Range  Outcome: Progressing  Goal: Absence of Infection Signs and Symptoms  Outcome: Progressing  Goal: Optimal Nutrition Intake  Outcome: Progressing     Problem: Infection  Goal: Absence of Infection Signs and Symptoms  Outcome: Progressing

## 2024-12-30 NOTE — ASSESSMENT & PLAN NOTE
Latest blood pressure and vitals reviewed-   Temp:  [97.8 °F (36.6 °C)]   Pulse:  []   Resp:  [17-18]   BP: ()/(56-74)   SpO2:  [96 %-99 %] .     - Not currently on home antihypertensives  - Initially presented hypotensive, responded well to IVF  - Will continue to monitor

## 2024-12-30 NOTE — CONSULTS
GASTROENTEROLOGY INPATIENT CONSULT NOTE  Patient Name: Shantal Orourke  Patient MRN: [id number]  Patient : 1950    Admit Date: 2024  Service date: 2024    Reason for Consult: enteritis    PCP: Elo Ervin MD    Chief Complaint   Patient presents with    Vomiting    Abdominal Pain       HPI: Patient is a 73 y.o. female with PMHx appy, diverticulosiis, HLD, HTN, DM, past tobacco use, hysterectomy presents for evaluation of n/v/d and abdominal cramps. Acute, constant, progressive on admission but now resolving. Currently on abx w/o any concerns.     CHART REVIEW:   CT Abd  - steatosis; Nml GB/biliary/panc; small HH; hysterectomy; enteritis; constipation; tics; mild vasc dz  Colon  - tic bleed s/p epi/clip  EGD  - small HH; small gastric ulcer    Past Medical History:  Past Medical History:   Diagnosis Date    Cataract     Cataract of both eyes 2021    Patient would like to be referred to an [facility] physician    Diverticulitis     Encounter for blood transfusion     Hemorrhage     from rectum    Hepatitis A     High cholesterol     Hypertension         Past Surgical History:  Past Surgical History:   Procedure Laterality Date    APPENDECTOMY      CATARACT EXTRACTION W/  INTRAOCULAR LENS IMPLANT Right 2021    Procedure: CEIOL OD;  Surgeon: Hardik Martinez MD;  Location: [facility] OR;  Service: Ophthalmology;  Laterality: Right;    CATARACT EXTRACTION W/  INTRAOCULAR LENS IMPLANT Left 2021    Procedure: CEIOL OS;  Surgeon: Hardik Martinez MD;  Location: [facility] OR;  Service: Ophthalmology;  Laterality: Left;    COLONOSCOPY      COLONOSCOPY N/A 2022    Procedure: COLONOSCOPY;  Surgeon: Mireille Mariano MD;  Location: [facility];  Service: Endoscopy;  Laterality: N/A;    ESOPHAGOGASTRODUODENOSCOPY N/A 2022    Procedure: EGD (ESOPHAGOGASTRODUODENOSCOPY);  Surgeon: Mireille Mariano MD;  Location: [facility] ENDO;  Service: Endoscopy;  Laterality: N/A;    EYE SURGERY       HYSTERECTOMY      JOINT REPLACEMENT      KNEE SURGERY  05/01/2019    Right TKA    TONSILLECTOMY      TUBAL LIGATION          Home Medications:  Medications Prior to Admission   Medication Sig Dispense Refill Last Dose/Taking    calcium-vitamin D3 (OS-CHARLES 500 + D3) 500 mg-5 mcg (200 unit) per tablet Take 1 tablet by mouth Daily.   12/27/2024    co-enzyme Q-10 30 mg capsule Take 30 mg by mouth once daily.   12/27/2024    eszopiclone (LUNESTA) 2 MG Tab Take 2 mg by mouth nightly as needed (Insomnia).   12/28/2024 Bedtime    metFORMIN (GLUCOPHAGE-XR) 500 MG ER 24hr tablet Take 500 mg by mouth before evening meal.   12/28/2024 Evening    rosuvastatin (CRESTOR) 10 MG tablet Take 20 mg by mouth every evening.   12/28/2024 Evening       Inpatient Medications:   atorvastatin  80 mg Oral QHS    calcium-vitamin D3  1 tablet Oral Daily    ciprofloxacin  400 mg Intravenous Q12H    metroNIDAZOLE IV (PEDS and ADULTS)  500 mg Intravenous Q8H    midodrine  5 mg Oral TID WM    mupirocin   Nasal BID       Current Facility-Administered Medications:     acetaminophen, 650 mg, Oral, Q4H PRN    aluminum-magnesium hydroxide-simethicone, 30 mL, Oral, QID PRN    dextrose 50%, 12.5 g, Intravenous, PRN    dextrose 50%, 25 g, Intravenous, PRN    glucagon (human recombinant), 1 mg, Intramuscular, PRN    glucose, 16 g, Oral, PRN    glucose, 24 g, Oral, PRN    HYDROcodone-acetaminophen, 1 tablet, Oral, Q6H PRN    insulin aspart U-100, 0-10 Units, Subcutaneous, QID (AC + HS) PRN    magnesium oxide, 800 mg, Oral, PRN    magnesium oxide, 800 mg, Oral, PRN    naloxone, 0.02 mg, Intravenous, PRN    ondansetron, 4 mg, Intravenous, Q6H PRN    potassium bicarbonate, 35 mEq, Oral, PRN    potassium bicarbonate, 50 mEq, Oral, PRN    potassium bicarbonate, 60 mEq, Oral, PRN    potassium, sodium phosphates, 2 packet, Oral, PRN    potassium, sodium phosphates, 2 packet, Oral, PRN    potassium, sodium phosphates, 2 packet, Oral, PRN    senna-docusate 8.6-50  "mg, 1 tablet, Oral, BID PRN    sodium chloride 0.9%, 10 mL, Intravenous, Q12H PRN    traZODone, 50 mg, Oral, Nightly PRN    Review of patient's allergies indicates:   Allergen Reactions    Penicillins Anaphylaxis    Biaxin [clarithromycin] Itching    Macrodantin [nitrofurantoin macrocrystalline] Other (See Comments)     Made to feel ill       Social History:   Social History     Occupational History    Not on file   Tobacco Use    Smoking status: Former     Current packs/day: 1.00     Average packs/day: 1 pack/day for 30.0 years (30.0 ttl pk-yrs)     Types: Cigarettes    Smokeless tobacco: Never   Substance and Sexual Activity    Alcohol use: No    Drug use: No    Sexual activity: Not on file       Family History:   Family History   Problem Relation Name Age of Onset    Psoriasis Child      Psoriasis Child      Psoriasis Child      Lupus Neg Hx      Melanoma Neg Hx         Review of Systems:  A 10 point review of systems was performed and was normal, except as mentioned in the HPI, including constitutional, HEENT, heme, lymph, cardiovascular, respiratory, gastrointestinal, genitourinary, neurologic, endocrine, psychiatric and musculoskeletal.      OBJECTIVE:    Physical Exam:  24 Hour Vital Sign Ranges: Temp:  [98.1 °F (36.7 °C)-98.5 °F (36.9 °C)] 98.1 °F (36.7 °C)  Pulse:  [67-85] 70  Resp:  [16-18] 18  SpO2:  [95 %-99 %] 97 %  BP: ()/(60-77) 116/77  Most recent vitals: /77   Pulse 70   Temp 98.1 °F (36.7 °C) (Oral)   Resp 18   Ht 5' 5" (1.651 m)   Wt 79.4 kg (175 lb)   SpO2 97%   BMI 29.12 kg/m²    GEN: well-developed, well-nourished, awake and alert, non-toxic appearing adult  HEENT: PERRL, sclera anicteric, oral mucosa pink and moist without lesion  NECK: trachea midline; Good ROM  CV: regular rate and rhythm, no murmurs or gallops  RESP: clear to auscultation bilaterally, no wheezes, rhonci or rales  ABD: soft, non-tender, non-distended, normal bowel sounds  EXT: no swelling or edema, 2+ " "pulses distally  SKIN: no rashes or jaundice  PSYCH: normal affect    Labs:   Recent Labs     12/29/24  1525 12/30/24  0534   WBC 17.24* 9.87   MCV 93 92    206     Recent Labs     12/29/24  1525 12/30/24  0534    139   K 4.4 4.3    109   CO2 24 25   BUN 22 18   * 100     No results for input(s): "ALB" in the last 72 hours.    Invalid input(s): "ALKP", "SGOT", "SGPT", "TBIL", "DBIL", "TPRO"  Recent Labs     12/30/24  0534   INR 1.0         Radiology Review:  CT Abdomen Pelvis With IV Contrast NO Oral Contrast   Final Result      Multiple loops of small bowel with abnormal circumferential wall thickening and adjacent mesenteric edema/fat stranding.  Findings are suggestive of a nonspecific enteritis including infectious, inflammatory, or ischemic etiologies.  Clinical correlation is advised.      Small volume of free fluid within the abdomen and pelvis, likely reactive.      Hepatic steatosis.      Colonic diverticulosis without evidence of acute diverticulitis.      Additional findings as above.         Electronically signed by: Danny Rothman MD   Date:    12/29/2024   Time:    17:55      X-Ray Chest AP Portable   Final Result      No acute cardiopulmonary abnormality.         Electronically signed by: Nadege Senior   Date:    12/29/2024   Time:    15:08            IMPRESSION / RECOMMENDATIONS:  73 y.o. female with PMHx appy, diverticulosiis, HLD, HTN, DM, past tobacco use, hysterectomy presents for evaluation of n/v/d and abdominal cramps w/ suspected acute infectious enteritis. Clinically improved w/o any concerning features currently and WBC/lactate much improved.      -Conservative from GI perspective  -Ok to d/c home on short course of abx    Thank you for this consult.    Jalen Sesay III  12/30/2024  4:37 PM        "

## 2024-12-30 NOTE — H&P
"  [facility] Dept  Hospital Medicine  History & Physical    Patient Name: Shantal Orourke  MRN: [id number]  Patient Class: IP- Inpatient  Admission Date: 12/29/2024  Attending Physician: Kanchan Champion MD   Primary Care Provider: Elo Ervin MD         Patient information was obtained from patient, past medical records, and ER records.     Subjective:     Principal Problem:Severe sepsis    Chief Complaint:   Chief Complaint   Patient presents with    Vomiting    Abdominal Pain        HPI: Ms. Orourke is a 73 yr old female with a hx of diverticulosis, HTN, HLD, abdominal aortic atherosclerosis, GI bleed, GERD, heart murmur, overactive bladder, DM type 2, anemia, cataract, hepatitis A, diastolic dysfunction who presented to the ED with a chief complaint of 5/10 generalized intermittent abdominal pain that woke her from sleep last night described as bloating and pressure."She states that she has also been experiencing nausea, vomiting, and diarrhea with 3-4 episodes of nonbloody emesis and to nonbloody loose bowel movements.  She states that eating and drinking makes her symptoms worse as well as movement.  Her pain is nonradiating and she has tried no treatments PTA to ED for her symptoms.  She also endorses decreased appetite as well as mild shortness of breath, diaphoresis, lightheadedness, and dizziness upon arrival to ED today.  She thinks that this is due to her blood pressure being low.  She states that these symptoms have now resolved.  She states that she quit smoking tobacco about 10 years ago and denies alcohol and recreational drug use.  She denies fever, chills, chest pain, dysuria, hematuria, syncope.    Upon arrival to ED, patient afebrile, HR of 102, RR of 18, BP of 77/56, satting 98% on RA.  Workup in the ED revealed WBC of 17.24, GFR of 43.7, glucose of 154, POCT glucose of 169, POC lactate of 2.90.  CXR shows no acute cardiopulmonary abnormality.  CTAP with IV " contrast shows multiple loops of small bowel with abnormal circumferential wall thickening and adjacent mesenteric edema/fat stranding.  Findings are suggestive of a nonspecific enteritis including infectious, inflammatory, or ischemic etiologies.  Patient was given 500 mL LR bolus, levofloxacin 750 mg IV, metronidazole 500 mg IV, morphine 4 mg IV, ondansetron 4 mg IV x2, NS 30 ml/kg bolus.  Patient had positive response to IV fluids and blood pressure improved.  Discussed case with ED provider and patient will be admitted under hospital medicine services.    Past Medical History:   Diagnosis Date    Cataract     Cataract of both eyes 1/26/2021    Patient would like to be referred to an [facility] physician    Diverticulitis     Encounter for blood transfusion     Hemorrhage     from rectum    Hepatitis A     High cholesterol     Hypertension        Past Surgical History:   Procedure Laterality Date    APPENDECTOMY      CATARACT EXTRACTION W/  INTRAOCULAR LENS IMPLANT Right 5/5/2021    Procedure: CEIOL OD;  Surgeon: Hardik Martinez MD;  Location: [facility] OR;  Service: Ophthalmology;  Laterality: Right;    CATARACT EXTRACTION W/  INTRAOCULAR LENS IMPLANT Left 6/30/2021    Procedure: CEIOL OS;  Surgeon: Hardik Martinez MD;  Location: [facility] OR;  Service: Ophthalmology;  Laterality: Left;    COLONOSCOPY      COLONOSCOPY N/A 7/31/2022    Procedure: COLONOSCOPY;  Surgeon: Mireille Mariano MD;  Location: [facility] ENDO;  Service: Endoscopy;  Laterality: N/A;    ESOPHAGOGASTRODUODENOSCOPY N/A 7/30/2022    Procedure: EGD (ESOPHAGOGASTRODUODENOSCOPY);  Surgeon: Mireille Mariano MD;  Location: [facility] ENDO;  Service: Endoscopy;  Laterality: N/A;    EYE SURGERY      HYSTERECTOMY      JOINT REPLACEMENT      KNEE SURGERY  05/01/2019    Right TKA    TONSILLECTOMY      TUBAL LIGATION         Review of patient's allergies indicates:   Allergen Reactions    Penicillins Anaphylaxis    Biaxin [clarithromycin] Itching    Macrodantin  [nitrofurantoin macrocrystalline] Other (See Comments)     Made to feel ill       No current facility-administered medications on file prior to encounter.     Current Outpatient Medications on File Prior to Encounter   Medication Sig    calcium-vitamin D3 (OS-CHARLES 500 + D3) 500 mg-5 mcg (200 unit) per tablet Take 1 tablet by mouth Daily.    co-enzyme Q-10 30 mg capsule Take 30 mg by mouth once daily.    eszopiclone (LUNESTA) 2 MG Tab Take 2 mg by mouth nightly as needed (Insomnia).    metFORMIN (GLUCOPHAGE-XR) 500 MG ER 24hr tablet Take 500 mg by mouth before evening meal.    rosuvastatin (CRESTOR) 10 MG tablet Take 20 mg by mouth every evening.    [DISCONTINUED] albuterol (VENTOLIN HFA) 90 mcg/actuation inhaler Inhale 2 puffs into the lungs every 6 (six) hours as needed for Wheezing. Rescue    [DISCONTINUED] cetirizine (ZYRTEC) 10 MG tablet Take 1 tablet (10 mg total) by mouth once daily. (Patient not taking: Reported on 4/26/2022)    [DISCONTINUED] eszopiclone (LUNESTA) 3 mg Tab Take 3 mg by mouth.    [DISCONTINUED] fenofibrate (TRICOR) 145 MG tablet Take 1 tablet (145 mg total) by mouth once daily.    [DISCONTINUED] gemfibroziL (LOPID) 600 MG tablet Take 600 mg by mouth. (Patient not taking: Reported on 12/29/2024)    [DISCONTINUED] montelukast (SINGULAIR) 10 mg tablet Take 1 tablet (10 mg total) by mouth once daily.    [DISCONTINUED] oxybutynin (DITROPAN-XL) 5 MG TR24 Take 1 tablet by mouth once daily.    [DISCONTINUED] pantoprazole (PROTONIX) 40 MG tablet Take 1 tablet (40 mg total) by mouth once daily.    [DISCONTINUED] traZODone (DESYREL) 50 MG tablet Take 1 tablet (50 mg total) by mouth every evening.     Family History       Problem Relation (Age of Onset)    Psoriasis Child, Child, Child          Tobacco Use    Smoking status: Former     Current packs/day: 1.00     Average packs/day: 1 pack/day for 30.0 years (30.0 ttl pk-yrs)     Types: Cigarettes    Smokeless tobacco: Never   Substance and Sexual Activity     Alcohol use: No    Drug use: No    Sexual activity: Not on file     Review of Systems   Constitutional:  Positive for appetite change (decreased) and diaphoresis. Negative for chills and fever.   Respiratory:  Positive for shortness of breath (resolved).    Cardiovascular:  Negative for chest pain.   Gastrointestinal:  Positive for abdominal pain, diarrhea, nausea and vomiting.   Genitourinary:  Negative for dysuria and hematuria.   Neurological:  Positive for dizziness (resolved) and light-headedness (resolved). Negative for syncope.     Objective:     Vital Signs (Most Recent):  Temp: 97.8 °F (36.6 °C) (12/29/24 1421)  Pulse: 75 (12/29/24 1700)  Resp: 18 (12/29/24 1421)  BP: 109/74 (12/29/24 1700)  SpO2: 98 % (12/29/24 1700) Vital Signs (24h Range):  Temp:  [97.8 °F (36.6 °C)] 97.8 °F (36.6 °C)  Pulse:  [] 75  Resp:  [18] 18  SpO2:  [96 %-99 %] 98 %  BP: ()/(56-74) 109/74     Weight: 79.4 kg (175 lb)  Body mass index is 29.12 kg/m².     Physical Exam  Vitals reviewed.   Constitutional:       General: She is awake. She is not in acute distress.     Appearance: Normal appearance. She is not ill-appearing or diaphoretic.   Cardiovascular:      Rate and Rhythm: Normal rate.      Heart sounds: Murmur heard.      No friction rub. No gallop.   Pulmonary:      Effort: Pulmonary effort is normal. No accessory muscle usage or respiratory distress.      Breath sounds: Normal breath sounds. No wheezing, rhonchi or rales.   Abdominal:      General: Bowel sounds are normal.      Palpations: Abdomen is soft.      Tenderness: There is no abdominal tenderness. There is no guarding or rebound.      Comments: No pain elicited on abdominal exam currently.  Patient tells me she feels better once receiving treatment.   Musculoskeletal:      Right lower leg: No edema.      Left lower leg: No edema.   Skin:     General: Skin is warm and dry.   Neurological:      Mental Status: She is alert and oriented to person, place,  and time.   Psychiatric:         Behavior: Behavior is cooperative.                Significant Labs: All pertinent labs within the past 24 hours have been reviewed.  CBC:   Recent Labs   Lab 12/29/24  1525   WBC 17.24*   HGB 15.5   HCT 47.8        CMP:   Recent Labs   Lab 12/29/24  1525      K 4.4      CO2 24   *   BUN 22   CREATININE 1.3   CALCIUM 9.4   PROT 7.5   ALBUMIN 4.4   BILITOT 1.0   ALKPHOS 98   AST 15   ALT 13   ANIONGAP 11     Lipase:   Recent Labs   Lab 12/29/24  1525   LIPASE 40     POCT Glucose:   Recent Labs   Lab 12/29/24  1514   POCTGLUCOSE 169*       Significant Imaging:   Imaging Results              CT Abdomen Pelvis With IV Contrast NO Oral Contrast (Final result)  Result time 12/29/24 17:55:09      Final result by Danny Rothman MD (12/29/24 17:55:09)                   Impression:      Multiple loops of small bowel with abnormal circumferential wall thickening and adjacent mesenteric edema/fat stranding.  Findings are suggestive of a nonspecific enteritis including infectious, inflammatory, or ischemic etiologies.  Clinical correlation is advised.    Small volume of free fluid within the abdomen and pelvis, likely reactive.    Hepatic steatosis.    Colonic diverticulosis without evidence of acute diverticulitis.    Additional findings as above.      Electronically signed by: Danny Rothman MD  Date:    12/29/2024  Time:    17:55               Narrative:    EXAMINATION:  CT ABDOMEN PELVIS WITH IV CONTRAST    CLINICAL HISTORY:  Abdominal pain, acute, nonlocalized;    TECHNIQUE:  Low dose axial images, sagittal and coronal reformations were obtained from the lung bases to the pubic symphysis following the IV administration of 100 mL of Omnipaque 350 .  Oral contrast was not given.    COMPARISON:  CTA GI bleed 07/30/2022    FINDINGS:  There are scattered bandlike opacities at the lung bases suggesting atelectasis or scarring.  No significant pleural fluid.  The  visualized portions of the heart appear normal.    The liver demonstrates diffuse decreased attenuation of the hepatic parenchyma suggesting hepatic steatosis.  The portal vein is patent.  The gallbladder shows no evidence of stones or pericholecystic fluid.  There is no intra-or extrahepatic biliary ductal dilatation.    There is a hiatal hernia present.  The stomach otherwise appears within normal limits.  The spleen, pancreas, and left adrenal gland are unremarkable.  There is a stable 1.8 cm right adrenal nodule containing macroscopic fat and calcification suggestive of a myelolipoma.    The kidneys are normal in size and location and enhance symmetrically.  There is no hydronephrosis.  There are small bilateral parapelvic cysts.  The urinary bladder is unremarkable. Uterus appears to be surgically absent.    The abdominal aorta is normal in course and caliber with moderate calcified and noncalcified atherosclerosis along its course.  The major branch vessels including the visualized SMA appear to be grossly patent.  There is no retroperitoneal hematoma.    There are multiple loops of small bowel with abnormal circumferential wall thickening with adjacent mesenteric edema and fat stranding.  Findings are suggestive of a nonspecific enteritis.  There is moderate volume of retained stool throughout the colon.  There are scattered colonic diverticula without evidence of acute diverticulitis.  There is a small volume of free fluid within the upper abdomen and the pelvis.  There is no free intraperitoneal air or portal venous gas identified.  The appendix is not definitively identified.  There are scattered shotty mesenteric and retroperitoneal lymph nodes.    Osseous structures demonstrate mild degenerative changes of the spine.  The extraperitoneal soft tissues are unremarkable.                                       X-Ray Chest AP Portable (Final result)  Result time 12/29/24 15:08:35      Final result by Nadeen  "Nadege NOLEN MD (12/29/24 15:08:35)                   Impression:      No acute cardiopulmonary abnormality.      Electronically signed by: Nadege Senior  Date:    12/29/2024  Time:    15:08               Narrative:    EXAMINATION:  XR CHEST AP PORTABLE    CLINICAL HISTORY:  Sepsis;    FINDINGS:  Portable chest at 14:48 hours is compared to 02/24/2021 shows normal cardiomediastinal silhouette.    Lungs are clear. Pulmonary vasculature is normal. No acute osseous abnormality.                                     Assessment/Plan:     * Severe sepsis  This patient does have evidence of infective focus  My overall impression is sepsis.  Source: Abdominal  Antibiotics given-   Antibiotics (72h ago, onward)      Start     Stop Route Frequency Ordered    12/31/24 1730  levoFLOXacin 750 mg/150 mL IVPB 750 mg        Note to Pharmacy: Ht: 5' 5" (1.651 m)  Wt: 79.4 kg (175 lb)  Estimated Creatinine Clearance: 40.8 mL/min (based on SCr of 1.3 mg/dL).  Body mass index is 29.12 kg/m².    -- IV Every 48 hours (non-standard times) 12/29/24 1846    12/29/24 1615  metronidazole IVPB 500 mg         -- IV Every 8 hours (non-standard times) 12/29/24 1510    12/29/24 1515  levoFLOXacin 750 mg/150 mL IVPB 750 mg         12/30/24 0314 IV ED 1 Time 12/29/24 1510          Latest lactate reviewed-  Recent Labs   Lab 12/29/24  1518   POCLAC 2.90*     Organ dysfunction indicated by  N/A    Fluid challenge Actual Body weight- Patient will receive 30ml/kg actual body weight to calculate fluid bolus for treatment of septic shock.     Post- resuscitation assessment Yes Perfusion exam was performed within 6 hours of septic shock presentation after bolus shows Adequate tissue perfusion assessed by non-invasive monitoring       Will Not start Pressors- Levophed for MAP of 65  Source control achieved by:  Levofloxacin and metronidazole    - Initial lactic 2.90  - Blood cultures pending  - CXR negative  - UA pending  - COVID and flu negative  - CTAP " with findings concerning for enteritis  - Stool studies pending  - Will treat empirically with levofloxacin and Flagyl  - PRN analgesia and symptomatic care  -  ml/hr for 10 hours, reassess need in the a.m.  - GI consulted, appreciate recs    Type 2 diabetes mellitus  Last A1c reviewed-   Lab Results   Component Value Date    HGBA1C 6.1 (H) 09/16/2024     Most recent fingerstick glucose reviewed-   Recent Labs   Lab 12/29/24  1514   POCTGLUCOSE 169*     Current correctional scale  Medium  Maintain anti-hyperglycemic dose as follows-   Antihyperglycemics (From admission, onward)      Start     Stop Route Frequency Ordered    12/29/24 1938  insulin aspart U-100 pen 0-10 Units         -- SubQ Before meals & nightly PRN 12/29/24 1846          Hold Oral hypoglycemics while patient is in the hospital.    Enteritis  - See plan for severe sepsis      Diastolic dysfunction  - Hx noted      Heart murmur  - Appreciate on exam      Primary hypertension  Latest blood pressure and vitals reviewed-   Temp:  [97.8 °F (36.6 °C)]   Pulse:  []   Resp:  [17-18]   BP: ()/(56-74)   SpO2:  [96 %-99 %] .     - Not currently on home antihypertensives  - Initially presented hypotensive, responded well to IVF  - Will continue to monitor      VTE Risk Mitigation (From admission, onward)           Ordered     enoxaparin injection 40 mg  Daily         12/29/24 1846     IP VTE HIGH RISK PATIENT  Once         12/29/24 1846     Place sequential compression device  Until discontinued         12/29/24 1846                     On 12/29/2024, patient will be placed under hospital medicine services in collaboration with Kanchan Champion MD.                Demi Larry PA-C  Department of Hospital Medicine  [facility] - Emergency Dept

## 2024-12-30 NOTE — PLAN OF CARE
[facility]  Initial Discharge Assessment       Primary Care Provider: Elo Ervin MD    Admission Diagnosis: Severe sepsis [A41.9, R65.20]    Admission Date: 12/29/2024  Expected Discharge Date: 12/31/2024      met with Pt at bedside to complete discharge assessment. Pt AAOx4s. Demographics, PCP, and insurance verified. Pharmacy updated. No home health. No dialysis. Pt reports ability to complete ADLs without assistance. Pt has bedside commode and walker but does not use. Pt verbalized plan to discharge home via family transport. Pt has no other needs to be addressed at this time.     Payor: MEDICARE / Plan: MEDICARE PART A & B / Product Type: Government /     Extended Emergency Contact Information  Primary Emergency Contact: Charmaine Reid  Home Phone: [phone]  Mobile Phone: [phone]  Relation: Daughter  Preferred language: English   needed? No    Discharge Plan A: Home with family  Discharge Plan B: Home      [id number] - [place] - [address].  [address] [place] [address]  [place] [address]  Phone: [phone] Fax: [phone]      Initial Assessment (most recent)       Adult Discharge Assessment - 12/30/24 1610          Discharge Assessment    Assessment Type Discharge Planning Assessment     Confirmed/corrected address, phone number and insurance Yes     Confirmed Demographics Correct on Facesheet     Source of Information patient     When was your last doctors appointment? --   3/4 mo ago    Communicated SHANTELL with patient/caregiver Yes     Reason For Admission Severe sepsis     People in Home alone;child(karrie), adult   lives alone, son has been staying there recently    Do you expect to return to your current living situation? Yes     Do you have help at home or someone to help you manage your care at home? No     Prior to hospitilization cognitive status: Unable to Assess     Current cognitive status: Alert/Oriented     Walking or  Climbing Stairs Difficulty no     Dressing/Bathing Difficulty no     Equipment Currently Used at Home bedside commode;walker, standard   has but does not use    Readmission within 30 days? No     Patient currently being followed by outpatient case management? No     Do you currently have service(s) that help you manage your care at home? No     Do you take prescription medications? Yes     Do you have prescription coverage? Yes     Do you have any problems affording any of your prescribed medications? No     Is the patient taking medications as prescribed? yes     Who is going to help you get home at discharge? Charmaine Reid (Daughter)  [phone]     How do you get to doctors appointments? car, drives self     Are you on dialysis? No     Do you take coumadin? No     Discharge Plan A Home with family     Discharge Plan B Home     DME Needed Upon Discharge  none     Discharge Plan discussed with: Patient

## 2024-12-31 VITALS
DIASTOLIC BLOOD PRESSURE: 80 MMHG | TEMPERATURE: 98 F | SYSTOLIC BLOOD PRESSURE: 123 MMHG | RESPIRATION RATE: 18 BRPM | HEART RATE: 74 BPM | OXYGEN SATURATION: 97 % | HEIGHT: 65 IN | BODY MASS INDEX: 29.16 KG/M2 | WEIGHT: 175 LBS

## 2024-12-31 LAB
ANION GAP SERPL CALC-SCNC: 5 MMOL/L (ref 8–16)
BASOPHILS # BLD AUTO: 0.03 K/UL (ref 0–0.2)
BASOPHILS NFR BLD: 0.4 % (ref 0–1.9)
BUN SERPL-MCNC: 18 MG/DL (ref 8–23)
CALCIUM SERPL-MCNC: 8.7 MG/DL (ref 8.7–10.5)
CHLORIDE SERPL-SCNC: 110 MMOL/L (ref 95–110)
CO2 SERPL-SCNC: 25 MMOL/L (ref 23–29)
CREAT SERPL-MCNC: 1 MG/DL (ref 0.5–1.4)
DIFFERENTIAL METHOD BLD: ABNORMAL
EOSINOPHIL # BLD AUTO: 0.3 K/UL (ref 0–0.5)
EOSINOPHIL NFR BLD: 3.7 % (ref 0–8)
ERYTHROCYTE [DISTWIDTH] IN BLOOD BY AUTOMATED COUNT: 13.6 % (ref 11.5–14.5)
EST. GFR  (NO RACE VARIABLE): 59.5 ML/MIN/1.73 M^2
GLUCOSE SERPL-MCNC: 108 MG/DL (ref 70–110)
HCT VFR BLD AUTO: 31.6 % (ref 37–48.5)
HGB BLD-MCNC: 10.2 G/DL (ref 12–16)
IMM GRANULOCYTES # BLD AUTO: 0.03 K/UL (ref 0–0.04)
IMM GRANULOCYTES NFR BLD AUTO: 0.4 % (ref 0–0.5)
LYMPHOCYTES # BLD AUTO: 2.8 K/UL (ref 1–4.8)
LYMPHOCYTES NFR BLD: 34.1 % (ref 18–48)
MAGNESIUM SERPL-MCNC: 1.9 MG/DL (ref 1.6–2.6)
MCH RBC QN AUTO: 30 PG (ref 27–31)
MCHC RBC AUTO-ENTMCNC: 32.3 G/DL (ref 32–36)
MCV RBC AUTO: 93 FL (ref 82–98)
MONOCYTES # BLD AUTO: 0.6 K/UL (ref 0.3–1)
MONOCYTES NFR BLD: 7.7 % (ref 4–15)
NEUTROPHILS # BLD AUTO: 4.5 K/UL (ref 1.8–7.7)
NEUTROPHILS NFR BLD: 53.7 % (ref 38–73)
NRBC BLD-RTO: 0 /100 WBC
PHOSPHATE SERPL-MCNC: 3.5 MG/DL (ref 2.7–4.5)
PLATELET # BLD AUTO: 177 K/UL (ref 150–450)
PMV BLD AUTO: 9.7 FL (ref 9.2–12.9)
POCT GLUCOSE: 106 MG/DL (ref 70–110)
POCT GLUCOSE: 139 MG/DL (ref 70–110)
POTASSIUM SERPL-SCNC: 4 MMOL/L (ref 3.5–5.1)
RBC # BLD AUTO: 3.4 M/UL (ref 4–5.4)
SODIUM SERPL-SCNC: 140 MMOL/L (ref 136–145)
WBC # BLD AUTO: 8.29 K/UL (ref 3.9–12.7)

## 2024-12-31 PROCEDURE — 25000003 PHARM REV CODE 250: Performed by: FAMILY MEDICINE

## 2024-12-31 PROCEDURE — 93005 ELECTROCARDIOGRAM TRACING: CPT | Performed by: INTERNAL MEDICINE

## 2024-12-31 PROCEDURE — 93010 ELECTROCARDIOGRAM REPORT: CPT | Mod: ,,, | Performed by: INTERNAL MEDICINE

## 2024-12-31 PROCEDURE — 36415 COLL VENOUS BLD VENIPUNCTURE: CPT

## 2024-12-31 PROCEDURE — 63600175 PHARM REV CODE 636 W HCPCS: Performed by: HOSPITALIST

## 2024-12-31 PROCEDURE — 83735 ASSAY OF MAGNESIUM: CPT

## 2024-12-31 PROCEDURE — 25000003 PHARM REV CODE 250: Performed by: HOSPITALIST

## 2024-12-31 PROCEDURE — 84100 ASSAY OF PHOSPHORUS: CPT

## 2024-12-31 PROCEDURE — 80048 BASIC METABOLIC PNL TOTAL CA: CPT

## 2024-12-31 PROCEDURE — 85025 COMPLETE CBC W/AUTO DIFF WBC: CPT

## 2024-12-31 PROCEDURE — 25000003 PHARM REV CODE 250

## 2024-12-31 RX ORDER — MIDODRINE HYDROCHLORIDE 5 MG/1
5 TABLET ORAL
Qty: 90 TABLET | Refills: 0 | Status: SHIPPED | OUTPATIENT
Start: 2024-12-31 | End: 2025-01-30

## 2024-12-31 RX ORDER — MIDODRINE HYDROCHLORIDE 5 MG/1
5 TABLET ORAL
Qty: 90 TABLET | Refills: 11 | Status: SHIPPED | OUTPATIENT
Start: 2024-12-31 | End: 2024-12-31

## 2024-12-31 RX ORDER — METRONIDAZOLE 500 MG/1
500 TABLET ORAL 3 TIMES DAILY
Qty: 21 TABLET | Refills: 0 | Status: SHIPPED | OUTPATIENT
Start: 2024-12-31 | End: 2025-01-07

## 2024-12-31 RX ORDER — CIPROFLOXACIN 500 MG/1
500 TABLET ORAL 2 TIMES DAILY
Qty: 14 TABLET | Refills: 0 | Status: SHIPPED | OUTPATIENT
Start: 2024-12-31 | End: 2025-01-07

## 2024-12-31 RX ADMIN — CIPROFLOXACIN 400 MG: 2 INJECTION, SOLUTION INTRAVENOUS at 09:12

## 2024-12-31 RX ADMIN — MIDODRINE HYDROCHLORIDE 5 MG: 2.5 TABLET ORAL at 11:12

## 2024-12-31 RX ADMIN — METRONIDAZOLE 500 MG: 500 INJECTION, SOLUTION INTRAVENOUS at 06:12

## 2024-12-31 RX ADMIN — Medication 1 TABLET: at 09:12

## 2024-12-31 RX ADMIN — MUPIROCIN 1 G: 20 OINTMENT TOPICAL at 09:12

## 2024-12-31 RX ADMIN — MIDODRINE HYDROCHLORIDE 5 MG: 2.5 TABLET ORAL at 09:12

## 2024-12-31 RX ADMIN — SODIUM CHLORIDE: 9 INJECTION, SOLUTION INTRAVENOUS at 09:12

## 2024-12-31 NOTE — PLAN OF CARE
PCP office closed, unable to schedule follow up apt. Instructions added to AVS for pt to call to schedule apt on Thursday 12/31/24 2990   Post-Acute Status   Hospital Resources/Appts/Education Provided Appointment suggestion unavailable

## 2024-12-31 NOTE — PLAN OF CARE
Pt clear for DC from case management standpoint. Discharging to home       12/31/24 1430   Final Note   Assessment Type Final Discharge Note   Anticipated Discharge Disposition Home

## 2024-12-31 NOTE — DISCHARGE SUMMARY
"[facility] Medicine  Discharge Summary      Patient Name: Shantal Orourke  MRN: [id number]  SUE: [id number]  Patient Class: IP- Inpatient  Admission Date: 12/29/2024  Hospital Length of Stay: 2 days  Discharge Date and Time:  12/31/2024 4:12 PM  Attending Physician: Jillian Renner DO   Discharging Provider: Jillian Renner DO  Primary Care Provider: Elo Ervin MD    Primary Care Team: Networked reference to record PCT     HPI:   Ms. Orourke is a 73 yr old female with a hx of diverticulosis, HTN, HLD, abdominal aortic atherosclerosis, GI bleed, GERD, heart murmur, overactive bladder, DM type 2, anemia, cataract, hepatitis A, diastolic dysfunction who presented to the ED with a chief complaint of 5/10 generalized intermittent abdominal pain that woke her from sleep last night described as bloating and pressure."She states that she has also been experiencing nausea, vomiting, and diarrhea with 3-4 episodes of nonbloody emesis and to nonbloody loose bowel movements.  She states that eating and drinking makes her symptoms worse as well as movement.  Her pain is nonradiating and she has tried no treatments PTA to ED for her symptoms.  She also endorses decreased appetite as well as mild shortness of breath, diaphoresis, lightheadedness, and dizziness upon arrival to ED today.  She thinks that this is due to her blood pressure being low.  She states that these symptoms have now resolved.  She states that she quit smoking tobacco about 10 years ago and denies alcohol and recreational drug use.  She denies fever, chills, chest pain, dysuria, hematuria, syncope.    Upon arrival to ED, patient afebrile, HR of 102, RR of 18, BP of 77/56, satting 98% on RA.  Workup in the ED revealed WBC of 17.24, GFR of 43.7, glucose of 154, POCT glucose of 169, POC lactate of 2.90.  CXR shows no acute cardiopulmonary abnormality.  CTAP with IV contrast shows multiple loops of small bowel with abnormal " circumferential wall thickening and adjacent mesenteric edema/fat stranding.  Findings are suggestive of a nonspecific enteritis including infectious, inflammatory, or ischemic etiologies.  Patient was given 500 mL LR bolus, levofloxacin 750 mg IV, metronidazole 500 mg IV, morphine 4 mg IV, ondansetron 4 mg IV x2, NS 30 ml/kg bolus.  Patient had positive response to IV fluids and blood pressure improved.  Discussed case with ED provider and patient will be admitted under hospital medicine services.    * No surgery found *      Hospital Course:   Patient is a 73 yr old female with a hx of diverticulosis, HTN, HLD, abdominal aortic atherosclerosis, GI bleed, GERD, heart murmur, overactive bladder, DM type 2, anemia, cataract, hepatitis A, diastolic dysfunction came in with abdominal pain and admitted for severe sepsis.  Imaging showed concern for enteritis and patient was started on IV antibiotics.  Patient said she did not have diarrhea just regular bowel movement.  Her abdominal pain has been completely resolved so diet was resumed.  GI was consulted by admitting team.  Patient was given fluid and started on midodrine for low blood pressure and recommended to hold if her systolic blood pressures greater than 130.  Patient was discharged with Ciprofloxacillin and metronidazole.  Recommended that patient follow-up with PCP     Goals of Care Treatment Preferences:  Code Status: Full Code      SDOH Screening:  The patient was screened for utility difficulties, food insecurity, transport difficulties, housing insecurity, and interpersonal safety and there were no concerns identified this admission.     Consults:   Consults (From admission, onward)          Status Ordering Provider     Inpatient consult to Gastroenterology  Once        Provider:  GOLDIE Hoyos MD    Acknowledged JAQUELIN RICE            * Severe sepsis  Resolved  Imaging showed concern for enteritis and patient was started on IV antibiotics.     Her abdominal pain has been completely resolved so diet was resumed.   GI was consulted per admitting team  Patient was given fluid and started on midodrine for low blood pressure.    Type 2 diabetes mellitus  Last A1c reviewed-   Lab Results   Component Value Date    HGBA1C 6.1 (H) 09/16/2024     Most recent fingerstick glucose reviewed-   Recent Labs   Lab 12/29/24  1514   POCTGLUCOSE 169*     Current correctional scale  Medium  Maintain anti-hyperglycemic dose as follows-   Antihyperglycemics (From admission, onward)      Start     Stop Route Frequency Ordered    12/29/24 1938  insulin aspart U-100 pen 0-10 Units         -- SubQ Before meals & nightly PRN 12/29/24 1846          Hold Oral hypoglycemics while patient is in the hospital.    Enteritis  - See plan for severe sepsis      Diastolic dysfunction  - Hx noted      Heart murmur  - Appreciate on exam      Primary hypertension  Latest blood pressure and vitals reviewed-   Temp:  [97.8 °F (36.6 °C)]   Pulse:  []   Resp:  [17-18]   BP: ()/(56-74)   SpO2:  [96 %-99 %] .     - Not currently on home antihypertensives  - Initially presented hypotensive, responded well to IVF  - Will continue to monitor      Final Active Diagnoses:    Diagnosis Date Noted POA    PRINCIPAL PROBLEM:  Severe sepsis [A41.9, R65.20] 12/29/2024 Yes    Enteritis [K52.9] 12/29/2024 Yes    Type 2 diabetes mellitus [E11.9] 12/29/2024 Yes    Diastolic dysfunction [I51.89] 12/27/2022 Yes    Heart murmur [R01.1] 06/23/2022 Yes    Primary hypertension [I10] 01/26/2021 Yes      Problems Resolved During this Admission:       Discharged Condition: fair    Disposition: Home or Self Care    Follow Up:   Follow-up Information       Elo Ervin MD Follow up.    Specialties: Hospitalist, Internal Medicine  Why: unable to schedule hospital follow up d/t holiday. please call on Thursday to schedule  Contact information:  [address]  [address]  [place]  [address]  [phone]                           Patient Instructions:   No discharge procedures on file.    Significant Diagnostic Studies: Labs: BMP:   Recent Labs   Lab 12/30/24  0534 12/31/24  0532    108    140   K 4.3 4.0    110   CO2 25 25   BUN 18 18   CREATININE 1.0 1.0   CALCIUM 8.4* 8.7   MG 1.9 1.9   , CMP   Recent Labs   Lab 12/30/24  0534 12/31/24  0532    140   K 4.3 4.0    110   CO2 25 25    108   BUN 18 18   CREATININE 1.0 1.0   CALCIUM 8.4* 8.7   ANIONGAP 5* 5*   , and CBC   Recent Labs   Lab 12/30/24  0534 12/31/24  0532   WBC 9.87 8.29   HGB 10.6* 10.2*   HCT 33.7* 31.6*    177       Pending Diagnostic Studies:       None           Medications:  Reconciled Home Medications:      Medication List        START taking these medications      ciprofloxacin HCl 500 MG tablet  Commonly known as: CIPRO  Take 1 tablet (500 mg total) by mouth 2 (two) times daily. for 7 days     metroNIDAZOLE 500 MG tablet  Commonly known as: FLAGYL  Take 1 tablet (500 mg total) by mouth 3 (three) times daily. for 7 days     midodrine 5 MG Tab  Commonly known as: PROAMATINE  Take 1 tablet (5 mg total) by mouth 3 (three) times daily with meals. Hold if SBP greater than 130            CONTINUE taking these medications      calcium-vitamin D3 500 mg-5 mcg (200 unit) per tablet  Commonly known as: OS-CHARLES 500 + D3  Take 1 tablet by mouth Daily.     co-enzyme Q-10 30 mg capsule  Take 30 mg by mouth once daily.     eszopiclone 2 MG Tab  Commonly known as: LUNESTA  Take 2 mg by mouth nightly as needed (Insomnia).     rosuvastatin 10 MG tablet  Commonly known as: CRESTOR  Take 20 mg by mouth every evening.            STOP taking these medications      metFORMIN 500 MG ER 24hr tablet  Commonly known as: GLUCOPHAGE-XR              Indwelling Lines/Drains at time of discharge:   Lines/Drains/Airways       None                   Time spent on the discharge of patient: 32 minutes         Jillian  DO Zurdo  Department of Hospital Medicine  [facility]

## 2024-12-31 NOTE — NURSING
Declined wheelchair.    Ambulated to double doors sat and waited for daughter to pull up.     NADN at this time.

## 2024-12-31 NOTE — NURSING
Discharge/ AVS given to patient and reviewed with patient and daughter at bedside,.     Allowed for questions.    Verbalizes understanding.

## 2025-01-03 LAB
BACTERIA BLD CULT: NORMAL
BACTERIA BLD CULT: NORMAL

## 2025-01-05 LAB
OHS QRS DURATION: 68 MS
OHS QRS DURATION: 72 MS
OHS QTC CALCULATION: 427 MS
OHS QTC CALCULATION: 440 MS

## 2025-05-13 DIAGNOSIS — Z12.31 ENCOUNTER FOR SCREENING MAMMOGRAM FOR MALIGNANT NEOPLASM OF BREAST: Primary | ICD-10-CM

## 2025-06-20 ENCOUNTER — HOSPITAL ENCOUNTER (OUTPATIENT)
Facility: HOSPITAL | Age: 75
Discharge: HOME | End: 2025-06-21
Attending: EMERGENCY MEDICINE | Admitting: FAMILY MEDICINE
Payer: MEDICARE

## 2025-06-20 DIAGNOSIS — K62.5 RECTAL BLEEDING: Primary | ICD-10-CM

## 2025-06-20 DIAGNOSIS — Z87.19 HISTORY OF DIVERTICULOSIS: ICD-10-CM

## 2025-06-20 PROBLEM — D64.9 ANEMIA: Status: ACTIVE | Noted: 2025-06-20

## 2025-06-20 PROBLEM — Z87.11 HISTORY OF GASTRIC ULCER: Chronic | Status: ACTIVE | Noted: 2025-06-20

## 2025-06-20 LAB
ABSOLUTE EOSINOPHIL (SMH): 0.1 K/UL
ABSOLUTE EOSINOPHIL (SMH): 0.18 K/UL
ABSOLUTE MONOCYTE (SMH): 0.73 K/UL (ref 0.3–1)
ABSOLUTE MONOCYTE (SMH): 0.77 K/UL (ref 0.3–1)
ABSOLUTE NEUTROPHIL COUNT (SMH): 4.1 K/UL (ref 1.8–7.7)
ABSOLUTE NEUTROPHIL COUNT (SMH): 5.7 K/UL (ref 1.8–7.7)
ALBUMIN SERPL-MCNC: 3.8 G/DL (ref 3.5–5.2)
ALP SERPL-CCNC: 78 UNIT/L (ref 55–135)
ALT SERPL-CCNC: 17 UNIT/L (ref 10–44)
ANION GAP (SMH): 9 MMOL/L (ref 8–16)
APTT PPP: 23.9 SECONDS (ref 21–32)
AST SERPL-CCNC: 17 UNIT/L (ref 10–40)
BASOPHILS # BLD AUTO: 0.04 K/UL
BASOPHILS # BLD AUTO: 0.05 K/UL
BASOPHILS NFR BLD AUTO: 0.5 %
BASOPHILS NFR BLD AUTO: 0.5 %
BILIRUB SERPL-MCNC: 0.6 MG/DL (ref 0.1–1)
BUN SERPL-MCNC: 23 MG/DL (ref 8–23)
CALCIUM SERPL-MCNC: 8.8 MG/DL (ref 8.7–10.5)
CHLORIDE SERPL-SCNC: 113 MMOL/L (ref 95–110)
CO2 SERPL-SCNC: 19 MMOL/L (ref 23–29)
CREAT SERPL-MCNC: 0.8 MG/DL (ref 0.5–1.4)
ERYTHROCYTE [DISTWIDTH] IN BLOOD BY AUTOMATED COUNT: 13.9 % (ref 11.5–14.5)
ERYTHROCYTE [DISTWIDTH] IN BLOOD BY AUTOMATED COUNT: 14 % (ref 11.5–14.5)
GFR SERPLBLD CREATININE-BSD FMLA CKD-EPI: >60 ML/MIN/1.73/M2
GLUCOSE SERPL-MCNC: 155 MG/DL (ref 70–110)
HCT VFR BLD AUTO: 33.9 % (ref 37–48.5)
HCT VFR BLD AUTO: 35.8 % (ref 37–48.5)
HCV AB SERPL QL IA: NORMAL
HGB BLD-MCNC: 11 GM/DL (ref 12–16)
HGB BLD-MCNC: 11.4 GM/DL (ref 12–16)
HIV 1+2 AB+HIV1 P24 AG SERPL QL IA: NORMAL
IMM GRANULOCYTES # BLD AUTO: 0.02 K/UL (ref 0–0.04)
IMM GRANULOCYTES # BLD AUTO: 0.04 K/UL (ref 0–0.04)
IMM GRANULOCYTES NFR BLD AUTO: 0.3 % (ref 0–0.5)
IMM GRANULOCYTES NFR BLD AUTO: 0.4 % (ref 0–0.5)
INDIRECT COOMBS: NORMAL
INR PPP: 1 (ref 0.8–1.2)
LYMPHOCYTES # BLD AUTO: 2.69 K/UL (ref 1–4.8)
LYMPHOCYTES # BLD AUTO: 3.37 K/UL (ref 1–4.8)
MCH RBC QN AUTO: 29.5 PG (ref 27–31)
MCH RBC QN AUTO: 30 PG (ref 27–31)
MCHC RBC AUTO-ENTMCNC: 31.8 G/DL (ref 32–36)
MCHC RBC AUTO-ENTMCNC: 32.4 G/DL (ref 32–36)
MCV RBC AUTO: 92 FL (ref 82–98)
MCV RBC AUTO: 93 FL (ref 82–98)
NUCLEATED RBC (/100WBC) (SMH): 0 /100 WBC
NUCLEATED RBC (/100WBC) (SMH): 0 /100 WBC
OB PNL STL: POSITIVE
PLATELET # BLD AUTO: 196 K/UL (ref 150–450)
PLATELET # BLD AUTO: 203 K/UL (ref 150–450)
PMV BLD AUTO: 9.5 FL (ref 9.2–12.9)
PMV BLD AUTO: 9.8 FL (ref 9.2–12.9)
POCT GLUCOSE: 133 MG/DL (ref 70–110)
POTASSIUM SERPL-SCNC: 4.4 MMOL/L (ref 3.5–5.1)
PROT SERPL-MCNC: 6.5 GM/DL (ref 6–8.4)
PROTHROMBIN TIME: 10.9 SECONDS (ref 9–12.5)
RBC # BLD AUTO: 3.67 M/UL (ref 4–5.4)
RBC # BLD AUTO: 3.87 M/UL (ref 4–5.4)
RELATIVE EOSINOPHIL (SMH): 1 % (ref 0–8)
RELATIVE EOSINOPHIL (SMH): 2.3 % (ref 0–8)
RELATIVE LYMPHOCYTE (SMH): 33.8 % (ref 18–48)
RELATIVE LYMPHOCYTE (SMH): 34.5 % (ref 18–48)
RELATIVE MONOCYTE (SMH): 7.7 % (ref 4–15)
RELATIVE MONOCYTE (SMH): 9.4 % (ref 4–15)
RELATIVE NEUTROPHIL (SMH): 53 % (ref 38–73)
RELATIVE NEUTROPHIL (SMH): 56.6 % (ref 38–73)
RH BLD: NORMAL
SODIUM SERPL-SCNC: 141 MMOL/L (ref 136–145)
SPECIMEN OUTDATE: NORMAL
WBC # BLD AUTO: 7.79 K/UL (ref 3.9–12.7)
WBC # BLD AUTO: 9.98 K/UL (ref 3.9–12.7)

## 2025-06-20 PROCEDURE — 80053 COMPREHEN METABOLIC PANEL: CPT

## 2025-06-20 PROCEDURE — G0378 HOSPITAL OBSERVATION PER HR: HCPCS

## 2025-06-20 PROCEDURE — 25000003 PHARM REV CODE 250

## 2025-06-20 PROCEDURE — 36415 COLL VENOUS BLD VENIPUNCTURE: CPT

## 2025-06-20 PROCEDURE — 63600175 PHARM REV CODE 636 W HCPCS

## 2025-06-20 PROCEDURE — 85025 COMPLETE CBC W/AUTO DIFF WBC: CPT | Mod: 91

## 2025-06-20 PROCEDURE — 96376 TX/PRO/DX INJ SAME DRUG ADON: CPT

## 2025-06-20 PROCEDURE — 87389 HIV-1 AG W/HIV-1&-2 AB AG IA: CPT | Performed by: EMERGENCY MEDICINE

## 2025-06-20 PROCEDURE — 86803 HEPATITIS C AB TEST: CPT | Performed by: EMERGENCY MEDICINE

## 2025-06-20 PROCEDURE — 85025 COMPLETE CBC W/AUTO DIFF WBC: CPT

## 2025-06-20 PROCEDURE — 25000003 PHARM REV CODE 250: Performed by: INTERNAL MEDICINE

## 2025-06-20 PROCEDURE — 86850 RBC ANTIBODY SCREEN: CPT | Performed by: EMERGENCY MEDICINE

## 2025-06-20 PROCEDURE — 82272 OCCULT BLD FECES 1-3 TESTS: CPT

## 2025-06-20 PROCEDURE — 99285 EMERGENCY DEPT VISIT HI MDM: CPT | Mod: 25

## 2025-06-20 PROCEDURE — 96375 TX/PRO/DX INJ NEW DRUG ADDON: CPT

## 2025-06-20 PROCEDURE — 96374 THER/PROPH/DIAG INJ IV PUSH: CPT

## 2025-06-20 PROCEDURE — 36415 COLL VENOUS BLD VENIPUNCTURE: CPT | Performed by: EMERGENCY MEDICINE

## 2025-06-20 PROCEDURE — 82962 GLUCOSE BLOOD TEST: CPT

## 2025-06-20 PROCEDURE — 85730 THROMBOPLASTIN TIME PARTIAL: CPT

## 2025-06-20 PROCEDURE — 85610 PROTHROMBIN TIME: CPT

## 2025-06-20 RX ORDER — NALOXONE HCL 0.4 MG/ML
0.02 VIAL (ML) INJECTION
Status: DISCONTINUED | OUTPATIENT
Start: 2025-06-20 | End: 2025-06-21 | Stop reason: HOSPADM

## 2025-06-20 RX ORDER — LANOLIN ALCOHOL/MO/W.PET/CERES
800 CREAM (GRAM) TOPICAL
Status: DISCONTINUED | OUTPATIENT
Start: 2025-06-20 | End: 2025-06-21 | Stop reason: HOSPADM

## 2025-06-20 RX ORDER — ONDANSETRON HYDROCHLORIDE 2 MG/ML
4 INJECTION, SOLUTION INTRAVENOUS EVERY 8 HOURS PRN
Status: DISCONTINUED | OUTPATIENT
Start: 2025-06-20 | End: 2025-06-21 | Stop reason: HOSPADM

## 2025-06-20 RX ORDER — ALBUTEROL SULFATE 90 UG/1
2 INHALANT RESPIRATORY (INHALATION) EVERY 4 HOURS PRN
COMMUNITY
Start: 2025-04-07

## 2025-06-20 RX ORDER — INSULIN ASPART 100 [IU]/ML
0-5 INJECTION, SOLUTION INTRAVENOUS; SUBCUTANEOUS
Status: DISCONTINUED | OUTPATIENT
Start: 2025-06-20 | End: 2025-06-21 | Stop reason: HOSPADM

## 2025-06-20 RX ORDER — ATORVASTATIN CALCIUM 40 MG/1
40 TABLET, FILM COATED ORAL NIGHTLY
Status: DISCONTINUED | OUTPATIENT
Start: 2025-06-20 | End: 2025-06-21 | Stop reason: HOSPADM

## 2025-06-20 RX ORDER — ALUMINUM HYDROXIDE, MAGNESIUM HYDROXIDE, AND SIMETHICONE 1200; 120; 1200 MG/30ML; MG/30ML; MG/30ML
30 SUSPENSION ORAL 4 TIMES DAILY PRN
Status: DISCONTINUED | OUTPATIENT
Start: 2025-06-20 | End: 2025-06-21 | Stop reason: HOSPADM

## 2025-06-20 RX ORDER — GLUCAGON 1 MG
1 KIT INJECTION
Status: DISCONTINUED | OUTPATIENT
Start: 2025-06-20 | End: 2025-06-21 | Stop reason: HOSPADM

## 2025-06-20 RX ORDER — MORPHINE SULFATE 2 MG/ML
2 INJECTION, SOLUTION INTRAMUSCULAR; INTRAVENOUS EVERY 6 HOURS PRN
Status: DISCONTINUED | OUTPATIENT
Start: 2025-06-20 | End: 2025-06-21 | Stop reason: HOSPADM

## 2025-06-20 RX ORDER — SODIUM CHLORIDE 0.9 % (FLUSH) 0.9 %
10 SYRINGE (ML) INJECTION EVERY 12 HOURS PRN
Status: DISCONTINUED | OUTPATIENT
Start: 2025-06-20 | End: 2025-06-21 | Stop reason: HOSPADM

## 2025-06-20 RX ORDER — CETIRIZINE HYDROCHLORIDE 10 MG/1
10 TABLET ORAL EVERY MORNING
COMMUNITY
Start: 2025-04-07 | End: 2026-04-07

## 2025-06-20 RX ORDER — TALC
9 POWDER (GRAM) TOPICAL NIGHTLY PRN
Status: DISCONTINUED | OUTPATIENT
Start: 2025-06-20 | End: 2025-06-21 | Stop reason: HOSPADM

## 2025-06-20 RX ORDER — ROSUVASTATIN CALCIUM 20 MG/1
20 TABLET, COATED ORAL NIGHTLY
COMMUNITY
Start: 2025-04-01 | End: 2025-09-28

## 2025-06-20 RX ORDER — PROCHLORPERAZINE EDISYLATE 5 MG/ML
5 INJECTION INTRAMUSCULAR; INTRAVENOUS EVERY 6 HOURS PRN
Status: DISCONTINUED | OUTPATIENT
Start: 2025-06-20 | End: 2025-06-21 | Stop reason: HOSPADM

## 2025-06-20 RX ORDER — MONTELUKAST SODIUM 10 MG/1
10 TABLET ORAL NIGHTLY
COMMUNITY

## 2025-06-20 RX ORDER — POLYETHYLENE GLYCOL 3350 17 G/17G
255 POWDER, FOR SOLUTION ORAL ONCE
Status: COMPLETED | OUTPATIENT
Start: 2025-06-20 | End: 2025-06-20

## 2025-06-20 RX ORDER — PANTOPRAZOLE SODIUM 40 MG/10ML
40 INJECTION, POWDER, LYOPHILIZED, FOR SOLUTION INTRAVENOUS 2 TIMES DAILY
Status: DISCONTINUED | OUTPATIENT
Start: 2025-06-20 | End: 2025-06-21 | Stop reason: HOSPADM

## 2025-06-20 RX ORDER — IBUPROFEN 200 MG
16 TABLET ORAL
Status: DISCONTINUED | OUTPATIENT
Start: 2025-06-20 | End: 2025-06-21 | Stop reason: HOSPADM

## 2025-06-20 RX ORDER — ACETAMINOPHEN 325 MG/1
650 TABLET ORAL EVERY 8 HOURS PRN
Status: DISCONTINUED | OUTPATIENT
Start: 2025-06-20 | End: 2025-06-21 | Stop reason: HOSPADM

## 2025-06-20 RX ORDER — BISACODYL 5 MG
10 TABLET, DELAYED RELEASE (ENTERIC COATED) ORAL ONCE
Status: COMPLETED | OUTPATIENT
Start: 2025-06-20 | End: 2025-06-20

## 2025-06-20 RX ORDER — IBUPROFEN 200 MG
24 TABLET ORAL
Status: DISCONTINUED | OUTPATIENT
Start: 2025-06-20 | End: 2025-06-21 | Stop reason: HOSPADM

## 2025-06-20 RX ADMIN — PROCHLORPERAZINE EDISYLATE 5 MG: 5 INJECTION INTRAMUSCULAR; INTRAVENOUS at 10:06

## 2025-06-20 RX ADMIN — PANTOPRAZOLE SODIUM 40 MG: 40 INJECTION, POWDER, FOR SOLUTION INTRAVENOUS at 09:06

## 2025-06-20 RX ADMIN — PANTOPRAZOLE SODIUM 40 MG: 40 INJECTION, POWDER, FOR SOLUTION INTRAVENOUS at 08:06

## 2025-06-20 RX ADMIN — ATORVASTATIN CALCIUM 40 MG: 40 TABLET, FILM COATED ORAL at 08:06

## 2025-06-20 RX ADMIN — POLYETHYLENE GLYCOL 3350 255 G: 17 POWDER, FOR SOLUTION ORAL at 08:06

## 2025-06-20 RX ADMIN — ONDANSETRON 4 MG: 2 INJECTION INTRAMUSCULAR; INTRAVENOUS at 08:06

## 2025-06-20 RX ADMIN — BISACODYL 10 MG: 5 TABLET, COATED ORAL at 07:06

## 2025-06-20 NOTE — CONSULTS
GASTROENTEROLOGY INPATIENT CONSULT NOTE  Patient Name: Shantal Orourke  Patient MRN: [id number]  Patient : 1950    Admit Date: 2025  Service date: 2025    Reason for Consult:  Rectal bleed    PCP: Elo Ervin MD    Chief Complaint   Patient presents with    Rectal Bleeding     Patient states that she began with rectal bleeding this morning. She report hx of gi bleed       HPI: Patient is 74 year old female with a past medical history of anemia, GI bleed with diverticulosis, gastric ulcer, enteritis 2024, peptic ulcer disease, mild reactive airway disease, hyperlipidemia, abdominal aortic atherosclerosis, heart murmur, hepatitis A, hypertension, diabetes mellitus, congestive heart failure with diastolic dysfunction, GERD, and hypertension on PRN midodrine who presents with complaints of 1 episode of bloody stools with dark clots prior to arrival. Her last colonoscopy was in  which noted diverticulosis in the entire colon and 1 with a clot and active bleeding after clot removal, given epinephrine and hemoclip placement. Her last EGD in  noted normal esophagus with small hiatal hernia, gastritis, oozing gastric ulcer and was treated with cautery and to repeat follow-up EGD 8 weeks to check for healed ulcer. However no follow-up EGD noted per chart. In 2024 noted to have enteritis and was discharged home on Cipro and Flagyl. She denies chest pain, fever, chills, dizziness, hematuria, hematemesis, abdominal pain, nausea, or vomiting. Upon assessment she reports frustration with care in ED during last admission and states she will sign out AMA if everything remains stable. CBC white count 7.7 and H&H 11.4/35.8 with platelets 196. Prior H&H on  13.7/42.7. CMP with creatinine 0.8 and glucose 155. Stool for occult blood pending. She is hemodynamically stable. GI consult placed. Admitted to hospital medicine for further management and treatment.        CHART  REVIEW:  Colonoscopy 07/31/2022 Dr. Lewis with red blood throughout the colon and many diverticulosis.  Diverticulum with adherent clot in the descending colon treated with epinephrine for hemostasis and hemostatic clip  EGD 07/30/2022 small hiatal hernia.  Mild antral congestion.  One oozing superficial gastric ulcer treated with bipolar cautery    Past Medical History:  Past Medical History:   Diagnosis Date    Cataract     Cataract of both eyes 1/26/2021    Patient would like to be referred to an [facility] physician    Diverticulitis     Encounter for blood transfusion     Hemorrhage     from rectum    Hepatitis A     High cholesterol     Hypertension         Past Surgical History:  Past Surgical History:   Procedure Laterality Date    APPENDECTOMY      CATARACT EXTRACTION W/  INTRAOCULAR LENS IMPLANT Right 5/5/2021    Procedure: CEIOL OD;  Surgeon: Hardik Martinez MD;  Location: [facility] OR;  Service: Ophthalmology;  Laterality: Right;    CATARACT EXTRACTION W/  INTRAOCULAR LENS IMPLANT Left 6/30/2021    Procedure: CEIOL OS;  Surgeon: Hardik Martinez MD;  Location: [facility] OR;  Service: Ophthalmology;  Laterality: Left;    COLONOSCOPY      COLONOSCOPY N/A 7/31/2022    Procedure: COLONOSCOPY;  Surgeon: Mireille Mariano MD;  Location: [facility];  Service: Endoscopy;  Laterality: N/A;    ESOPHAGOGASTRODUODENOSCOPY N/A 7/30/2022    Procedure: EGD (ESOPHAGOGASTRODUODENOSCOPY);  Surgeon: Mireille Mariano MD;  Location: [facility];  Service: Endoscopy;  Laterality: N/A;    EYE SURGERY      HYSTERECTOMY      JOINT REPLACEMENT      KNEE SURGERY  05/01/2019    Right TKA    TONSILLECTOMY      TUBAL LIGATION          Home Medications:  Prescriptions Prior to Admission[1]    Inpatient Medications:  Review of patient's allergies indicates:   Allergen Reactions    Penicillins Anaphylaxis    Biaxin [clarithromycin] Itching    Macrodantin [nitrofurantoin macrocrystalline] Other (See Comments)     Made to feel ill       Social  "History:   Social History     Occupational History    Not on file   Tobacco Use    Smoking status: Former     Current packs/day: 1.00     Average packs/day: 1 pack/day for 30.0 years (30.0 ttl pk-yrs)     Types: Cigarettes    Smokeless tobacco: Never   Substance and Sexual Activity    Alcohol use: No    Drug use: No    Sexual activity: Not on file       Family History:   Family History   Problem Relation Name Age of Onset    Psoriasis Child      Psoriasis Child      Psoriasis Child      Lupus Neg Hx      Melanoma Neg Hx         Review of Systems:  GENERAL: No fever, chills, weight loss  SKIN: No rashes, jaundice, pruritus  HEENT: No rhinorrhea, epistaxis, vision changes.  No trauma, tinnitus, lymphadenopathy or pharyngitis  CV: No chest pain, palpitations, edima or MEDEROS  PULM: No cough or sputum production.  No wheezing.  GI: AS IN HPI  URINARY:  No hematuria, dysuria  MS: No change in muscle or joint pain, weakness, or ROM  Neuro: No focal neurologic changes  PSYCH: No change in mood or personality.  No suicidal ideation.  ENDOCRINE: No fatigue      OBJECTIVE:    Vitals:    06/20/25 1159 06/20/25 1314 06/20/25 1330 06/20/25 1359   BP: (!) 105/57 119/67 107/67 114/69   BP Location:       Pulse: 87 95 88 83   Resp: 18  20    Temp:       TempSrc:       SpO2: 98% 100% 97% 98%   Weight:       Height:         Physical Exam:    GEN: well-developed, well-nourished, awake and alert, non-toxic appearing adult  HEENT: PERRL, sclera anicteric, oral mucosa pink and moist without lesion  NECK: trachea midline; Good ROM  CV: regular rate and rhythm, no murmurs or gallops  RESP: clear to auscultation bilaterally, no wheezes, rhonci or rales  ABD: soft, non-tender, non-distended, normal bowel sounds  EXT: no swelling or edema, 2+ pulses distally  SKIN: no rashes or jaundice  PSYCH: normal affect    Labs:   Recent Labs   Lab 06/20/25  0641   WBC 7.79   HGB 11.4*      MCV 93     No results for input(s): "IRON", "FERRITIN" in " "the last 168 hours.    Invalid input(s): "IRONSAT"  Recent Labs   Lab 06/20/25  0641      K 4.4   BUN 23   CREATININE 0.8   ALBUMIN 3.8   AST 17   ALT 17   ALKPHOS 78   INR 1.0            Radiology Review:  Imaging Results    None             IMPRESSION / RECOMMENDATIONS:   Active Problem List with Overview Notes    Diagnosis Date Noted    Rectal bleeding 06/20/2025    History of diverticulosis 06/20/2025    History of gastric ulcer 06/20/2025    Anemia 06/20/2025    Severe sepsis 12/29/2024    Enteritis 12/29/2024    Type 2 diabetes mellitus 12/29/2024    Diastolic dysfunction 12/27/2022    GIB (gastrointestinal bleeding) 07/30/2022    Acute blood loss anemia 07/30/2022    Heart murmur 06/23/2022     Last Assessment & Plan:   Formatting of this note might be different from the original.  I would like to investigate with an echocardiogram      Prediabetes 06/21/2022    Gastroesophageal reflux disease without esophagitis 03/24/2022     Last Assessment & Plan:   Formatting of this note might be different from the original.  Mild and not symptomatic on a daily basis      Overactive bladder 03/24/2022     Last Assessment & Plan:   Formatting of this note might be different from the original.  Trial of Ditropan XL      Primary insomnia 03/24/2022     Last Assessment & Plan:   Formatting of this note might be different from the original.  Has tried trazodone but does not like the way it makes her feel  Trial Belsomra      Diverticulosis of colon with hemorrhage 01/26/2021     Recurrent, patient to establish with a new GI      Primary hypertension 01/26/2021    Hyperlipidemia 01/26/2021    Seasonal allergic rhinitis due to pollen 01/26/2021     Uncontrolled, restart Singulair      BMI 32.0-32.9,adult 01/26/2021    Abdominal aortic atherosclerosis 01/26/2021   74 year old female with a past medical history of anemia, GI bleed with diverticulosis, gastric ulcer, enteritis 12/2024, peptic ulcer disease, mild reactive " airway disease, hyperlipidemia, abdominal aortic atherosclerosis, heart murmur, hepatitis A, hypertension, diabetes mellitus, congestive heart failure with diastolic dysfunction, GERD, and hypertension on PRN midodrine who presents with complaints of 1 episode of bloody stools with dark clots   Patient with a history of severe diverticular bleed in 2022 and gastric ulcer.  In 2022  CT with enteritis in December 2024  Pursue colonoscopy in a.m. with interventions as warranted  RIsks, benefits, alternatives discussed in detail regarding upcoming procedures and sedation and possible complications. Some of the more common endoscopic complications include but not limited to immediate or delayed perforation, bleeding, infections, pain, inadvertent injury to surrounding tissue / organs and possible need for surgical evaluation. All questions answered and will proceed with procedure as planned.         Thank you for this consult.    Radha Flores  6/20/2025  3:50 PM              [1] (Not in a hospital admission)

## 2025-06-20 NOTE — ASSESSMENT & PLAN NOTE
Patient's hemorrhage is due to gastrointestinal bleed, this bleeding is not associated with a medication or a coagulopathy. Patients most recent Hgb, Hct, platelets, and INR are listed below.  Recent Labs     06/20/25  0641   HGB 11.4*   HCT 35.8*      INR 1.0     Plan  - Will trend hemoglobin/hematocrit Every 8 hours  - Will monitor and correct any coagulation defects  - Will transfuse if Hgb is <7g/dl (<8g/dl in cases of active ACS) or if patient has rapid bleeding leading to hemodynamic instability  - GI consult placed  -stool occult blood pending

## 2025-06-20 NOTE — ASSESSMENT & PLAN NOTE
Patient's FSGs are uncontrolled due to hyperglycemia on current medication regimen.  Last A1c reviewed-   Lab Results   Component Value Date    HGBA1C 6.1 (H) 09/16/2024     Most recent fingerstick glucose reviewed-   Recent Labs   Lab 06/20/25  1016   POCTGLUCOSE 133*     Current correctional scale  Low  Maintain anti-hyperglycemic dose as follows-   Antihyperglycemics (From admission, onward)      Start     Stop Route Frequency Ordered    06/20/25 1002  insulin aspart U-100 pen 0-5 Units         -- SubQ Before meals & nightly PRN 06/20/25 0903          Hold Oral hypoglycemics while patient is in the hospital.

## 2025-06-20 NOTE — SUBJECTIVE & OBJECTIVE
Past Medical History:   Diagnosis Date    Cataract     Cataract of both eyes 1/26/2021    Patient would like to be referred to an [facility] physician    Diverticulitis     Encounter for blood transfusion     Hemorrhage     from rectum    Hepatitis A     High cholesterol     Hypertension        Past Surgical History:   Procedure Laterality Date    APPENDECTOMY      CATARACT EXTRACTION W/  INTRAOCULAR LENS IMPLANT Right 5/5/2021    Procedure: CEIOL OD;  Surgeon: Hardik Martinez MD;  Location: [facility] OR;  Service: Ophthalmology;  Laterality: Right;    CATARACT EXTRACTION W/  INTRAOCULAR LENS IMPLANT Left 6/30/2021    Procedure: CEIOL OS;  Surgeon: Hardik Martinez MD;  Location: [facility] OR;  Service: Ophthalmology;  Laterality: Left;    COLONOSCOPY      COLONOSCOPY N/A 7/31/2022    Procedure: COLONOSCOPY;  Surgeon: Mireille Mariano MD;  Location: [facility] ENDO;  Service: Endoscopy;  Laterality: N/A;    ESOPHAGOGASTRODUODENOSCOPY N/A 7/30/2022    Procedure: EGD (ESOPHAGOGASTRODUODENOSCOPY);  Surgeon: Mireille Mariano MD;  Location: [facility] ENDO;  Service: Endoscopy;  Laterality: N/A;    EYE SURGERY      HYSTERECTOMY      JOINT REPLACEMENT      KNEE SURGERY  05/01/2019    Right TKA    TONSILLECTOMY      TUBAL LIGATION         Review of patient's allergies indicates:   Allergen Reactions    Penicillins Anaphylaxis    Biaxin [clarithromycin] Itching    Macrodantin [nitrofurantoin macrocrystalline] Other (See Comments)     Made to feel ill       No current facility-administered medications on file prior to encounter.     Current Outpatient Medications on File Prior to Encounter   Medication Sig    albuterol (PROVENTIL/VENTOLIN HFA) 90 mcg/actuation inhaler Inhale 2 puffs into the lungs every 4 (four) hours as needed for Shortness of Breath.    calcium-vitamin D3 (OS-CHARLES 500 + D3) 500 mg-5 mcg (200 unit) per tablet Take 1 tablet by mouth Daily.    cetirizine (ZYRTEC) 10 MG tablet Take 10 mg by mouth every morning.    co-enzyme Q-10 30  mg capsule Take 30 mg by mouth once daily.    eszopiclone (LUNESTA) 2 MG Tab Take 2 mg by mouth nightly.    montelukast (SINGULAIR) 10 mg tablet Take 10 mg by mouth every evening.    rosuvastatin (CRESTOR) 20 MG tablet Take 20 mg by mouth every evening.    [DISCONTINUED] rosuvastatin (CRESTOR) 10 MG tablet Take 20 mg by mouth every evening.    [DISCONTINUED] midodrine (PROAMATINE) 5 MG Tab Take 1 tablet (5 mg total) by mouth 3 (three) times daily with meals. Hold if SBP greater than 130 (Patient taking differently: Take 5 mg by mouth as needed (Low BP). Hold if SBP greater than 130)     Family History       Problem Relation (Age of Onset)    Psoriasis Child, Child, Child          Tobacco Use    Smoking status: Former     Current packs/day: 1.00     Average packs/day: 1 pack/day for 30.0 years (30.0 ttl pk-yrs)     Types: Cigarettes    Smokeless tobacco: Never   Substance and Sexual Activity    Alcohol use: No    Drug use: No    Sexual activity: Not on file     Review of Systems   Constitutional:  Negative for activity change, appetite change, chills, diaphoresis, fatigue and fever.   Respiratory:  Negative for cough and shortness of breath.    Cardiovascular:  Negative for chest pain and leg swelling.   Gastrointestinal:  Positive for blood in stool. Negative for abdominal distention, abdominal pain, constipation, diarrhea, nausea and vomiting.   Genitourinary:  Negative for difficulty urinating and flank pain.   Musculoskeletal:  Negative for back pain.   Neurological:  Negative for dizziness, seizures, syncope, facial asymmetry, speech difficulty, weakness, light-headedness, numbness and headaches.     Objective:     Vital Signs (Most Recent):  Temp: 98.6 °F (37 °C) (06/20/25 0604)  Pulse: 88 (06/20/25 1330)  Resp: 20 (06/20/25 1330)  BP: 107/67 (06/20/25 1330)  SpO2: 97 % (06/20/25 1330) Vital Signs (24h Range):  Temp:  [98.6 °F (37 °C)] 98.6 °F (37 °C)  Pulse:  [] 88  Resp:  [10-20] 20  SpO2:  [97 %-100 %]  97 %  BP: ()/(57-77) 107/67     Weight: 81.2 kg (179 lb)  Body mass index is 29.79 kg/m².     Physical Exam  Vitals and nursing note reviewed.   Constitutional:       Appearance: She is not ill-appearing.   Eyes:      Pupils: Pupils are equal, round, and reactive to light.   Cardiovascular:      Rate and Rhythm: Normal rate and regular rhythm.      Pulses: Normal pulses.      Heart sounds: Normal heart sounds.   Pulmonary:      Effort: Pulmonary effort is normal. No respiratory distress.      Breath sounds: Normal breath sounds. No wheezing.   Abdominal:      General: Bowel sounds are normal. There is no distension.      Palpations: Abdomen is soft.      Tenderness: There is no abdominal tenderness. There is no guarding.   Musculoskeletal:      Right lower leg: No edema.      Left lower leg: No edema.   Skin:     General: Skin is warm and dry.      Capillary Refill: Capillary refill takes less than 2 seconds.   Neurological:      Mental Status: She is alert and oriented to person, place, and time.      GCS: GCS eye subscore is 4. GCS verbal subscore is 5. GCS motor subscore is 6.   Psychiatric:      Comments: Frustration              CRANIAL NERVES     CN III, IV, VI   Pupils are equal, round, and reactive to light.       Significant Labs: All pertinent labs within the past 24 hours have been reviewed.    Bilirubin:   Recent Labs   Lab 06/20/25  0641   BILITOT 0.6       BMP:   Recent Labs   Lab 06/20/25  0641   *      K 4.4   *   CO2 19*   BUN 23   CREATININE 0.8   CALCIUM 8.8     CBC:   Recent Labs   Lab 06/20/25  0641   WBC 7.79   HGB 11.4*   HCT 35.8*        CMP:   Recent Labs   Lab 06/20/25  0641      K 4.4   *   CO2 19*   *   BUN 23   CREATININE 0.8   CALCIUM 8.8   PROT 6.5   ALBUMIN 3.8   BILITOT 0.6   ALKPHOS 78   AST 17   ALT 17   ANIONGAP 9       Coagulation:   Recent Labs   Lab 06/20/25  0641   INR 1.0   APTT 23.9       POCT Glucose:   Recent Labs   Lab  06/20/25  1016   POCTGLUCOSE 133*         Significant Imaging: I have reviewed all pertinent imaging results/findings within the past 24 hours.

## 2025-06-20 NOTE — ED PROVIDER NOTES
Chief complaint:  Rectal Bleeding (Patient states that she began with rectal bleeding this morning. She report hx of gi bleed)      HPI:  Shantal Orourke is a 74 y.o. female with hx anemia, hyperlipidemia, htn, DM, CHF with diastolic dysfunction, hypotension on midodrine, GIB with diverticulosis and gastric ulcer (colonoscopy and upper endoscopy 2022) as well as enteritis 2024 presenting with rectal bleeding starting this morning.  She endorses one episode of bloody stool also with dark clots.  She has had numerous similar episodes.  She denies other symptoms as of yet.  She specifically denies any chest pain, dyspnea, abdominal pain.  No syncope or presyncope.  She denies anticoagulation or antiplatelet agents.    ROS: As per HPI and below:  No difficulty urinating, dysuria, hematuria, rash, swelling, confusion.    Review of patient's allergies indicates:   Allergen Reactions    Penicillins Anaphylaxis    Biaxin [clarithromycin] Itching    Macrodantin [nitrofurantoin macrocrystalline] Other (See Comments)     Made to feel ill       Patient's Medications   New Prescriptions    No medications on file   Previous Medications    ALBUTEROL (PROVENTIL/VENTOLIN HFA) 90 MCG/ACTUATION INHALER    Inhale 2 puffs into the lungs every 4 (four) hours as needed for Shortness of Breath.    CALCIUM-VITAMIN D3 (OS-CHARLES 500 + D3) 500 MG-5 MCG (200 UNIT) PER TABLET    Take 1 tablet by mouth Daily.    CETIRIZINE (ZYRTEC) 10 MG TABLET    Take 10 mg by mouth every morning.    CO-ENZYME Q-10 30 MG CAPSULE    Take 30 mg by mouth once daily.    ESZOPICLONE (LUNESTA) 2 MG TAB    Take 2 mg by mouth nightly.    MONTELUKAST (SINGULAIR) 10 MG TABLET    Take 10 mg by mouth every evening.    ROSUVASTATIN (CRESTOR) 20 MG TABLET    Take 20 mg by mouth every evening.   Modified Medications    No medications on file   Discontinued Medications    MIDODRINE (PROAMATINE) 5 MG TAB    Take 1 tablet (5 mg total) by mouth 3 (three) times daily with meals. Hold if  SBP greater than 130    ROSUVASTATIN (CRESTOR) 10 MG TABLET    Take 20 mg by mouth every evening.       PMH:  As per HPI and below:  Past Medical History:   Diagnosis Date    Cataract     Cataract of both eyes 1/26/2021    Patient would like to be referred to an [facility] physician    Diverticulitis     Encounter for blood transfusion     Hemorrhage     from rectum    Hepatitis A     High cholesterol     Hypertension      Past Surgical History:   Procedure Laterality Date    APPENDECTOMY      CATARACT EXTRACTION W/  INTRAOCULAR LENS IMPLANT Right 5/5/2021    Procedure: CEIOL OD;  Surgeon: Hardik Martinez MD;  Location: [facility] OR;  Service: Ophthalmology;  Laterality: Right;    CATARACT EXTRACTION W/  INTRAOCULAR LENS IMPLANT Left 6/30/2021    Procedure: CEIOL OS;  Surgeon: Hardik Martinez MD;  Location: [facility] OR;  Service: Ophthalmology;  Laterality: Left;    COLONOSCOPY      COLONOSCOPY N/A 7/31/2022    Procedure: COLONOSCOPY;  Surgeon: Mireille Mariano MD;  Location: [facility] ENDO;  Service: Endoscopy;  Laterality: N/A;    ESOPHAGOGASTRODUODENOSCOPY N/A 7/30/2022    Procedure: EGD (ESOPHAGOGASTRODUODENOSCOPY);  Surgeon: Mireille Mariano MD;  Location: [facility] ENDO;  Service: Endoscopy;  Laterality: N/A;    EYE SURGERY      HYSTERECTOMY      JOINT REPLACEMENT      KNEE SURGERY  05/01/2019    Right TKA    TONSILLECTOMY      TUBAL LIGATION         Social History     Socioeconomic History    Marital status:    Tobacco Use    Smoking status: Former     Current packs/day: 1.00     Average packs/day: 1 pack/day for 30.0 years (30.0 ttl pk-yrs)     Types: Cigarettes    Smokeless tobacco: Never   Substance and Sexual Activity    Alcohol use: No    Drug use: No     Social Drivers of Health     Financial Resource Strain: Low Risk  (12/30/2024)    Overall Financial Resource Strain (CARDIA)     Difficulty of Paying Living Expenses: Not hard at all   Food Insecurity: No Food Insecurity (12/30/2024)    Hunger Vital Sign      Worried About Running Out of Food in the Last Year: Never true     Ran Out of Food in the Last Year: Never true   Transportation Needs: No Transportation Needs (12/30/2024)    TRANSPORTATION NEEDS     Transportation : No   Physical Activity: Insufficiently Active (6/26/2024)    Received from [facility]    Exercise Vital Sign     Days of Exercise per Week: 2 days     Minutes of Exercise per Session: 30 min   Stress: No Stress Concern Present (12/30/2024)    [employer] - Occupational Stress Questionnaire     Feeling of Stress : Not at all   Housing Stability: Unknown (12/30/2024)    Housing Stability Vital Sign     Unable to Pay for Housing in the Last Year: No     Homeless in the Last Year: No       Family History   Problem Relation Name Age of Onset    Psoriasis Child      Psoriasis Child      Psoriasis Child      Lupus Neg Hx      Melanoma Neg Hx         Physical Exam:    Vitals:    06/20/25 1330   BP: 107/67   Pulse: 88   Resp: 20   Temp:      GENERAL:  No apparent distress.  Alert.    HEENT:  Moist mucous membranes.  Normocephalic and atraumatic.  Normal-appearing conjunctiva without pallor.  NECK:  No swelling.  Midline trachea.   CARDIOVASCULAR:  Regular rate and rhythm.  2+ radial pulses.  No murmur.  PULMONARY:  Lungs clear to auscultation bilaterally.  No wheezes, rales, or rhonci.  Unlabored respirations.  No tachypnea.  ABDOMEN:  Non-tender and non-distended.  No palpable mass or hernia.  EXTREMITIES:  Warm and well perfused.  Brisk capillary refill.  No edema.  NEUROLOGICAL:  Normal mental status.  Appropriate and conversant.    SKIN:  No rashes or ecchymoses.      Labs Reviewed   COMPREHENSIVE METABOLIC PANEL - Abnormal       Result Value    Sodium 141      Potassium 4.4      Chloride 113 (*)     CO2 19 (*)     Glucose 155 (*)     BUN 23      Creatinine 0.8      Calcium 8.8      Protein Total 6.5       Albumin 3.8      Bilirubin Total 0.6      ALP 78      AST 17      ALT 17      Anion Gap 9      eGFR >60     CBC WITH DIFFERENTIAL - Abnormal    WBC 7.79      RBC 3.87 (*)     Hgb 11.4 (*)     Hct 35.8 (*)     MCV 93      MCH 29.5      MCHC 31.8 (*)     RDW 13.9      Platelet Count 196      MPV 9.8      Nucleated RBC 0      Neut % 53.0      Lymph % 34.5      Mono % 9.4      Eos % 2.3      Basophil % 0.5      Imm Grans % 0.3      Neut # 4.1      Lymph # 2.69      Mono # 0.73      Eos # 0.18      Baso # 0.04      Imm Grans # 0.02     POCT GLUCOSE - Abnormal    POCT Glucose 133 (*)    PROTIME-INR - Normal    PT 10.9      INR 1.0     APTT - Normal    PTT 23.9     CBC W/ AUTO DIFFERENTIAL    Narrative:     The following orders were created for panel order CBC auto differential.  Procedure                               Abnormality         Status                     ---------                               -----------         ------                     CBC with Differential[[id number]]       Abnormal            Final result                 Please view results for these tests on the individual orders.   HEPATITIS C ANTIBODY   HEP C VIRUS HOLD SPECIMEN   HIV 1 / 2 ANTIBODY   HIV VIRUS CONFIRMATION HOLD SPECIMEN   CBC W/ AUTO DIFFERENTIAL    Narrative:     The following orders were created for panel order CBC auto differential.  Procedure                               Abnormality         Status                     ---------                               -----------         ------                     CBC with Differential[[id number]]                                                        Please view results for these tests on the individual orders.   CBC WITH DIFFERENTIAL   OCCULT BLOOD X 1, STOOL   POCT GLUCOSE, HAND-HELD DEVICE   TYPE & SCREEN    Specimen Outdate 06/23/2025 23:59      Group & Rh O POS      Indirect Elena NEG         Current Discharge Medication List        CONTINUE these medications which have NOT CHANGED     Details   albuterol (PROVENTIL/VENTOLIN HFA) 90 mcg/actuation inhaler Inhale 2 puffs into the lungs every 4 (four) hours as needed for Shortness of Breath.      calcium-vitamin D3 (OS-CHARLES 500 + D3) 500 mg-5 mcg (200 unit) per tablet Take 1 tablet by mouth Daily.      cetirizine (ZYRTEC) 10 MG tablet Take 10 mg by mouth every morning.      co-enzyme Q-10 30 mg capsule Take 30 mg by mouth once daily.      eszopiclone (LUNESTA) 2 MG Tab Take 2 mg by mouth nightly.      montelukast (SINGULAIR) 10 mg tablet Take 10 mg by mouth every evening.      rosuvastatin (CRESTOR) 20 MG tablet Take 20 mg by mouth every evening.             Orders Placed This Encounter   Procedures    Hepatitis C Antibody    HCV Virus Hold Specimen    HIV 1/2 Ag/Ab (4th Gen)    HIV Virus Confirmation Hold Specimen    CBC auto differential    Comprehensive metabolic panel    Protime-INR    CBC with Differential    APTT    CBC auto differential    CBC auto differential    Comprehensive metabolic panel    CBC with Differential    Occult blood x 1, stool    Diet NPO Except for: Sips with Medication    Cardiac Monitoring - Adult    Vital signs    Orthostatic blood pressure With vital signs    Check Type and Screen complete    Place sequential compression device    Check consent to blood transfusion    Weigh patient    Strict intake and output (1) Document % meals taken (2) # of urinations (3) # and consistency of BMs    Bladder scan    Notify Provider    Recheck Blood Glucose:    During the day, please open the shades and turn on the lights- If patient is in private room, please make sure they are close to the window.    Make sure the correct date and time is written on the whiteboard, as well as the current care team    Minimize interruptions at night-time, close shades and turn off TV.    When possible, during daytime make sure patient is in chair and provide activities that engage patient.    Please make sure patient has hearing aids and glasses while  awake.    Full code    Inpatient consult to Gastroenterology    POCT glucose    Type & Screen    Large Bore IV Access    Saline lock IV    Place in Observation       Imaging Results    None         ED Course as of 06/20/25 1408   Fri Jun 20, 2025   7239 Patient reports one additional similar bowel movement with blood since initial assessment.  No other change. [MR]      ED Course User Index  [MR] Danny Sexton MD       MDM:    74 y.o. female with rectal bleeding starting this morning in the setting of history of chronic, intermittent GI bleed.  Initial review of medical record shows multiple potential sources including diverticulosis.  One initial episode so far and patient appears euvolemic.  I do not think she requires emergent transfusion or imaging.  There is no other abdominal pain with a benign exam and I have very low suspicion for other life-threatening intra-abdominal process such as mesenteric ischemia, diverticulitis, obstruction.  I do not think further emergent abdominal imaging is indicated.  Laboratories sent to assess initial hemoglobin in comparison to baseline.  I will plan on observation for the patient given age and previous history.      Patient observed here with wanted additional bowel movement and I will admit.  I do not think she requires emergent transfusion at this time with planned for close observation and serial H&H.  She does notably have chronic hypotension on PRN midodrine she has not taken today.  She is not tachycardic or on AV jessica blockers.  I will discuss continuation with medicine in conjunction with admission.    Diagnoses:    1. Rectal bleeding       Danny Sexton MD  06/20/25 1409

## 2025-06-20 NOTE — H&P
[facility] - Emergency Dept  Hospital Medicine  History & Physical    Patient Name: Shantal Orourke  MRN: [id number]  Patient Class: OP- Observation  Admission Date: 6/20/2025  Attending Physician: Cornelia Masters MD   Primary Care Provider: Elo Ervin MD         Patient information was obtained from patient, past medical records, and ER records.     Subjective:     Principal Problem:Rectal bleeding    Chief Complaint:   Chief Complaint   Patient presents with    Rectal Bleeding     Patient states that she began with rectal bleeding this morning. She report hx of gi bleed        HPI: Shantal Orourke is a 74 year old female with a past medical history of anemia, GI bleed with diverticulosis, gastric ulcer, enteritis 12/2024, peptic ulcer disease, mild reactive airway disease, hyperlipidemia, abdominal aortic atherosclerosis, heart murmur, hepatitis A, hypertension, diabetes mellitus, congestive heart failure with diastolic dysfunction, GERD, and hypertension on PRN midodrine who presents with complaints of 1 episode of bloody stools with dark clots prior to arrival.  Her last colonoscopy was in 2022 which noted diverticulosis in the entire colon and 1 with a clot and active bleeding after clot removal, given epinephrine and hemoclip placement. Her last EGD in 2022 noted normal esophagus with small hiatal hernia, gastritis, oozing gastric ulcer and was treated with cautery and to repeat follow-up EGD 8 weeks to check for healed ulcer. However no follow-up EGD noted per chart. In December 2024 noted to have enteritis and was discharged home on Cipro and Flagyl. She denies chest pain, fever, chills, dizziness, hematuria, hematemesis, abdominal pain, nausea, or vomiting. Upon assessment she reports frustration with care in ED during last admission and states she will sign out AMA if everything remains stable.  CBC white count 7.7 and H&H 11.4/35.8 with platelets 196.  Prior H&H on 06/04  13.7/42.7.  CMP with creatinine 0.8 and glucose 155. Stool for occult blood pending.  She is hemodynamically stable.  GI consult placed.  Admitted to hospital medicine for further management and treatment.      Past Medical History:   Diagnosis Date    Cataract     Cataract of both eyes 1/26/2021    Patient would like to be referred to an [facility] physician    Diverticulitis     Encounter for blood transfusion     Hemorrhage     from rectum    Hepatitis A     High cholesterol     Hypertension        Past Surgical History:   Procedure Laterality Date    APPENDECTOMY      CATARACT EXTRACTION W/  INTRAOCULAR LENS IMPLANT Right 5/5/2021    Procedure: CEIOL OD;  Surgeon: Hardik Martinez MD;  Location: [facility] OR;  Service: Ophthalmology;  Laterality: Right;    CATARACT EXTRACTION W/  INTRAOCULAR LENS IMPLANT Left 6/30/2021    Procedure: CEIOL OS;  Surgeon: Hardik Martinez MD;  Location: [facility] OR;  Service: Ophthalmology;  Laterality: Left;    COLONOSCOPY      COLONOSCOPY N/A 7/31/2022    Procedure: COLONOSCOPY;  Surgeon: Mireille Mariano MD;  Location: [facility];  Service: Endoscopy;  Laterality: N/A;    ESOPHAGOGASTRODUODENOSCOPY N/A 7/30/2022    Procedure: EGD (ESOPHAGOGASTRODUODENOSCOPY);  Surgeon: Mireille Mariano MD;  Location: [facility];  Service: Endoscopy;  Laterality: N/A;    EYE SURGERY      HYSTERECTOMY      JOINT REPLACEMENT      KNEE SURGERY  05/01/2019    Right TKA    TONSILLECTOMY      TUBAL LIGATION         Review of patient's allergies indicates:   Allergen Reactions    Penicillins Anaphylaxis    Biaxin [clarithromycin] Itching    Macrodantin [nitrofurantoin macrocrystalline] Other (See Comments)     Made to feel ill       No current facility-administered medications on file prior to encounter.     Current Outpatient Medications on File Prior to Encounter   Medication Sig    albuterol (PROVENTIL/VENTOLIN HFA) 90 mcg/actuation inhaler Inhale 2 puffs into the lungs every 4 (four) hours as needed for  Shortness of Breath.    calcium-vitamin D3 (OS-CHARLES 500 + D3) 500 mg-5 mcg (200 unit) per tablet Take 1 tablet by mouth Daily.    cetirizine (ZYRTEC) 10 MG tablet Take 10 mg by mouth every morning.    co-enzyme Q-10 30 mg capsule Take 30 mg by mouth once daily.    eszopiclone (LUNESTA) 2 MG Tab Take 2 mg by mouth nightly.    montelukast (SINGULAIR) 10 mg tablet Take 10 mg by mouth every evening.    rosuvastatin (CRESTOR) 20 MG tablet Take 20 mg by mouth every evening.    [DISCONTINUED] rosuvastatin (CRESTOR) 10 MG tablet Take 20 mg by mouth every evening.    [DISCONTINUED] midodrine (PROAMATINE) 5 MG Tab Take 1 tablet (5 mg total) by mouth 3 (three) times daily with meals. Hold if SBP greater than 130 (Patient taking differently: Take 5 mg by mouth as needed (Low BP). Hold if SBP greater than 130)     Family History       Problem Relation (Age of Onset)    Psoriasis Child, Child, Child          Tobacco Use    Smoking status: Former     Current packs/day: 1.00     Average packs/day: 1 pack/day for 30.0 years (30.0 ttl pk-yrs)     Types: Cigarettes    Smokeless tobacco: Never   Substance and Sexual Activity    Alcohol use: No    Drug use: No    Sexual activity: Not on file     Review of Systems   Constitutional:  Negative for activity change, appetite change, chills, diaphoresis, fatigue and fever.   Respiratory:  Negative for cough and shortness of breath.    Cardiovascular:  Negative for chest pain and leg swelling.   Gastrointestinal:  Positive for blood in stool. Negative for abdominal distention, abdominal pain, constipation, diarrhea, nausea and vomiting.   Genitourinary:  Negative for difficulty urinating and flank pain.   Musculoskeletal:  Negative for back pain.   Neurological:  Negative for dizziness, seizures, syncope, facial asymmetry, speech difficulty, weakness, light-headedness, numbness and headaches.     Objective:     Vital Signs (Most Recent):  Temp: 98.6 °F (37 °C) (06/20/25 0604)  Pulse: 88  (06/20/25 1330)  Resp: 20 (06/20/25 1330)  BP: 107/67 (06/20/25 1330)  SpO2: 97 % (06/20/25 1330) Vital Signs (24h Range):  Temp:  [98.6 °F (37 °C)] 98.6 °F (37 °C)  Pulse:  [] 88  Resp:  [10-20] 20  SpO2:  [97 %-100 %] 97 %  BP: ()/(57-77) 107/67     Weight: 81.2 kg (179 lb)  Body mass index is 29.79 kg/m².     Physical Exam  Vitals and nursing note reviewed.   Constitutional:       Appearance: She is not ill-appearing.   Eyes:      Pupils: Pupils are equal, round, and reactive to light.   Cardiovascular:      Rate and Rhythm: Normal rate and regular rhythm.      Pulses: Normal pulses.      Heart sounds: Normal heart sounds.   Pulmonary:      Effort: Pulmonary effort is normal. No respiratory distress.      Breath sounds: Normal breath sounds. No wheezing.   Abdominal:      General: Bowel sounds are normal. There is no distension.      Palpations: Abdomen is soft.      Tenderness: There is no abdominal tenderness. There is no guarding.   Musculoskeletal:      Right lower leg: No edema.      Left lower leg: No edema.   Skin:     General: Skin is warm and dry.      Capillary Refill: Capillary refill takes less than 2 seconds.   Neurological:      Mental Status: She is alert and oriented to person, place, and time.      GCS: GCS eye subscore is 4. GCS verbal subscore is 5. GCS motor subscore is 6.   Psychiatric:      Comments: Frustration              CRANIAL NERVES     CN III, IV, VI   Pupils are equal, round, and reactive to light.       Significant Labs: All pertinent labs within the past 24 hours have been reviewed.    Bilirubin:   Recent Labs   Lab 06/20/25  0641   BILITOT 0.6       BMP:   Recent Labs   Lab 06/20/25  0641   *      K 4.4   *   CO2 19*   BUN 23   CREATININE 0.8   CALCIUM 8.8     CBC:   Recent Labs   Lab 06/20/25  0641   WBC 7.79   HGB 11.4*   HCT 35.8*        CMP:   Recent Labs   Lab 06/20/25  0641      K 4.4   *   CO2 19*   *   BUN 23  "  CREATININE 0.8   CALCIUM 8.8   PROT 6.5   ALBUMIN 3.8   BILITOT 0.6   ALKPHOS 78   AST 17   ALT 17   ANIONGAP 9       Coagulation:   Recent Labs   Lab 06/20/25  0641   INR 1.0   APTT 23.9       POCT Glucose:   Recent Labs   Lab 06/20/25  1016   POCTGLUCOSE 133*         Significant Imaging: I have reviewed all pertinent imaging results/findings within the past 24 hours.  Assessment/Plan:     Assessment & Plan  Rectal bleeding  Patient's hemorrhage is due to gastrointestinal bleed, this bleeding is not associated with a medication or a coagulopathy. Patients most recent Hgb, Hct, platelets, and INR are listed below.  Recent Labs     06/20/25  0641   HGB 11.4*   HCT 35.8*      INR 1.0     Plan  - Will trend hemoglobin/hematocrit Every 8 hours  - Will monitor and correct any coagulation defects  - Will transfuse if Hgb is <7g/dl (<8g/dl in cases of active ACS) or if patient has rapid bleeding leading to hemodynamic instability  - GI consult placed  -stool occult blood pending  Primary hypertension  Patient's blood pressure range in the last 24 hours was: BP  Min: 91/64  Max: 131/68.The patient's inpatient anti-hypertensive regimen is listed below:  Current Antihypertensives       Plan  - BP is controlled, no changes needed to their regimen  -   Gastroesophageal reflux disease without esophagitis  -chronic history noted  -continue PPI    Diastolic dysfunction  Patient is identified as having Diastolic (HFpEF) heart failure that is Chronic. CHF is currently controlled. Latest ECHO performed and demonstrates- No results found for this or any previous visit.  .monitor clinical status closely. Monitor on telemetry. Patient is  CHF pathway.  Monitor strict Is&Os and daily weights.  Place on fluid restriction of . Cardiology  consulted. Continue to stress to patient importance of self efficacy and  on diet for CHF. Last BNP reviewed- and noted below No results for input(s): "BNP", "BNPTRIAGEBLO" in the last 168 " hours..          Type 2 diabetes mellitus  Patient's FSGs are uncontrolled due to hyperglycemia on current medication regimen.  Last A1c reviewed-   Lab Results   Component Value Date    HGBA1C 6.1 (H) 09/16/2024     Most recent fingerstick glucose reviewed-   Recent Labs   Lab 06/20/25  1016   POCTGLUCOSE 133*     Current correctional scale  Low  Maintain anti-hyperglycemic dose as follows-   Antihyperglycemics (From admission, onward)      Start     Stop Route Frequency Ordered    06/20/25 1002  insulin aspart U-100 pen 0-5 Units         -- SubQ Before meals & nightly PRN 06/20/25 0903          Hold Oral hypoglycemics while patient is in the hospital.  History of diverticulosis  -chronic history noted    History of gastric ulcer  -chronic condition noted  -continue PPI    Anemia  Anemia is likely due to acute blood loss which was from rectal bleeding. Most recent hemoglobin and hematocrit are listed below.  Recent Labs     06/20/25  0641   HGB 11.4*   HCT 35.8*     Plan  - Monitor serial CBC: Every 8 hours  - Transfuse PRBC if patient becomes hemodynamically unstable, symptomatic or H/H drops below 7/21.  - Patient has not received any PRBC transfusions to date  - Patient's anemia is currently stable      VTE Risk Mitigation (From admission, onward)           Ordered     IP VTE HIGH RISK PATIENT  Once         06/20/25 0903     Place sequential compression device  Until discontinued         06/20/25 0903                                              Kirstin Gomez DNP  Department of Hospital Medicine  [facility] - Emergency Dept

## 2025-06-20 NOTE — PHARMACY MED REC
"Admission Medication History     The home medication history was taken by Real Flores.    You may go to "Admission" then "Reconcile Home Medications" tabs to review and/or act upon these items.     The home medication list has been updated by the Pharmacy department.   Please read ALL comments highlighted in yellow.   Please address this information as you see fit.    Feel free to contact us if you have any questions or require assistance.      The medications listed below were removed from the home medication list. Please reorder if appropriate:  Patient reports no longer taking the following medication(s):  Midodrine 5 mg    Medications listed below were obtained from: Patient/family and Analytic software- [employer]  No current facility-administered medications on file prior to encounter.     Current Outpatient Medications on File Prior to Encounter   Medication Sig Dispense Refill    albuterol (PROVENTIL/VENTOLIN HFA) 90 mcg/actuation inhaler Inhale 2 puffs into the lungs every 4 (four) hours as needed for Shortness of Breath.      calcium-vitamin D3 (OS-CHARLES 500 + D3) 500 mg-5 mcg (200 unit) per tablet Take 1 tablet by mouth Daily.      cetirizine (ZYRTEC) 10 MG tablet Take 10 mg by mouth every morning.      co-enzyme Q-10 30 mg capsule Take 30 mg by mouth once daily.      eszopiclone (LUNESTA) 2 MG Tab Take 2 mg by mouth nightly.      montelukast (SINGULAIR) 10 mg tablet Take 10 mg by mouth every evening.      rosuvastatin (CRESTOR) 20 MG tablet Take 20 mg by mouth every evening.      [DISCONTINUED] rosuvastatin (CRESTOR) 10 MG tablet Take 20 mg by mouth every evening.      [DISCONTINUED] midodrine (PROAMATINE) 5 MG Tab Take 1 tablet (5 mg total) by mouth 3 (three) times daily with meals. Hold if SBP greater than 130 (Patient taking differently: Take 5 mg by mouth as needed (Low BP). Hold if SBP greater than 130) 90 tablet 0             Real Flores  EXT 1921                .          "

## 2025-06-20 NOTE — NURSING
Patient transferred to unit via stretcher, patient ambulated to bed x 1, patient awake and alert, patient on RA, no complaints of pain voiced at this time.  Call light within easy reach, SR up x 2.  Bed alarm on and sounded when prompted.

## 2025-06-20 NOTE — HPI
Shantal Orourke is a 74 year old female with a past medical history of anemia, GI bleed with diverticulosis, gastric ulcer, enteritis 12/2024, peptic ulcer disease, mild reactive airway disease, hyperlipidemia, abdominal aortic atherosclerosis, heart murmur, hepatitis A, hypertension, diabetes mellitus, congestive heart failure with diastolic dysfunction, GERD, and hypertension on PRN midodrine who presents with complaints of 1 episode of bloody stools with dark clots prior to arrival.  Her last colonoscopy was in 2022 which noted diverticulosis in the entire colon and 1 with a clot and active bleeding after clot removal, given epinephrine and hemoclip placement. Her last EGD in 2022 noted normal esophagus with small hiatal hernia, gastritis, oozing gastric ulcer and was treated with cautery and to repeat follow-up EGD 8 weeks to check for healed ulcer. However no follow-up EGD noted per chart. In December 2024 noted to have enteritis and was discharged home on Cipro and Flagyl. She denies chest pain, fever, chills, dizziness, hematuria, hematemesis, abdominal pain, nausea, or vomiting. Upon assessment she reports frustration with care in ED during last admission and states she will sign out AMA if everything remains stable.  CBC white count 7.7 and H&H 11.4/35.8 with platelets 196.  Prior H&H on 06/04 13.7/42.7.  CMP with creatinine 0.8 and glucose 155. Stool for occult blood pending.  She is hemodynamically stable.  GI consult placed.  Admitted to hospital medicine for further management and treatment.

## 2025-06-20 NOTE — PT/OT/SLP PROGRESS
Physical Therapy      Patient Name:  Shantal Orourke   MRN:  [id number]    Patient not seen today secondary to patient declines need for PT evaluation. She reports being independent and doing cardio 2-3 times a week. She denies feeling weak and requested PT orders be discontinued with RN and MD notified.

## 2025-06-20 NOTE — ASSESSMENT & PLAN NOTE
Patient's blood pressure range in the last 24 hours was: BP  Min: 91/64  Max: 131/68.The patient's inpatient anti-hypertensive regimen is listed below:  Current Antihypertensives       Plan  - BP is controlled, no changes needed to their regimen  -

## 2025-06-20 NOTE — ASSESSMENT & PLAN NOTE
Anemia is likely due to acute blood loss which was from rectal bleeding. Most recent hemoglobin and hematocrit are listed below.  Recent Labs     06/20/25  0641   HGB 11.4*   HCT 35.8*     Plan  - Monitor serial CBC: Every 8 hours  - Transfuse PRBC if patient becomes hemodynamically unstable, symptomatic or H/H drops below 7/21.  - Patient has not received any PRBC transfusions to date  - Patient's anemia is currently stable

## 2025-06-20 NOTE — ASSESSMENT & PLAN NOTE
"Patient is identified as having Diastolic (HFpEF) heart failure that is Chronic. CHF is currently controlled. Latest ECHO performed and demonstrates- No results found for this or any previous visit.  .monitor clinical status closely. Monitor on telemetry. Patient is  CHF pathway.  Monitor strict Is&Os and daily weights.  Place on fluid restriction of . Cardiology  consulted. Continue to stress to patient importance of self efficacy and  on diet for CHF. Last BNP reviewed- and noted below No results for input(s): "BNP", "BNPTRIAGEBLO" in the last 168 hours..          "

## 2025-06-20 NOTE — ED NOTES
06/20/25 0932 06/20/25 0933 06/20/25 0935   Vital Signs   Pulse 77 86  --    Resp 16  --  16   SpO2 100 % 99 % 100 %   BP 92/65 101/66 102/66   MAP (mmHg) 74 78 76     Pt. Orthostatic VS. Dizziness upon standing.

## 2025-06-21 ENCOUNTER — ANESTHESIA (OUTPATIENT)
Dept: SURGERY | Facility: HOSPITAL | Age: 75
End: 2025-06-21
Payer: MEDICARE

## 2025-06-21 ENCOUNTER — ANESTHESIA EVENT (OUTPATIENT)
Dept: SURGERY | Facility: HOSPITAL | Age: 75
End: 2025-06-21
Payer: MEDICARE

## 2025-06-21 VITALS
WEIGHT: 176.31 LBS | HEIGHT: 65 IN | DIASTOLIC BLOOD PRESSURE: 82 MMHG | RESPIRATION RATE: 12 BRPM | OXYGEN SATURATION: 100 % | HEART RATE: 78 BPM | TEMPERATURE: 98 F | SYSTOLIC BLOOD PRESSURE: 119 MMHG | BODY MASS INDEX: 29.37 KG/M2

## 2025-06-21 PROBLEM — K62.5 RECTAL BLEEDING: Status: RESOLVED | Noted: 2025-06-20 | Resolved: 2025-06-21

## 2025-06-21 LAB
ABSOLUTE EOSINOPHIL (SMH): 0.02 K/UL
ABSOLUTE EOSINOPHIL (SMH): 0.07 K/UL
ABSOLUTE MONOCYTE (SMH): 0.57 K/UL (ref 0.3–1)
ABSOLUTE MONOCYTE (SMH): 0.93 K/UL (ref 0.3–1)
ABSOLUTE NEUTROPHIL COUNT (SMH): 6.8 K/UL (ref 1.8–7.7)
ABSOLUTE NEUTROPHIL COUNT (SMH): 8.4 K/UL (ref 1.8–7.7)
ALBUMIN SERPL-MCNC: 3.9 G/DL (ref 3.5–5.2)
ALP SERPL-CCNC: 84 UNIT/L (ref 55–135)
ALT SERPL-CCNC: 17 UNIT/L (ref 10–44)
ANION GAP (SMH): 10 MMOL/L (ref 8–16)
AST SERPL-CCNC: 14 UNIT/L (ref 10–40)
BASOPHILS # BLD AUTO: 0.03 K/UL
BASOPHILS # BLD AUTO: 0.04 K/UL
BASOPHILS NFR BLD AUTO: 0.3 %
BASOPHILS NFR BLD AUTO: 0.3 %
BILIRUB SERPL-MCNC: 0.8 MG/DL (ref 0.1–1)
BUN SERPL-MCNC: 21 MG/DL (ref 8–23)
CALCIUM SERPL-MCNC: 9 MG/DL (ref 8.7–10.5)
CHLORIDE SERPL-SCNC: 110 MMOL/L (ref 95–110)
CO2 SERPL-SCNC: 21 MMOL/L (ref 23–29)
CREAT SERPL-MCNC: 0.9 MG/DL (ref 0.5–1.4)
ERYTHROCYTE [DISTWIDTH] IN BLOOD BY AUTOMATED COUNT: 13.9 % (ref 11.5–14.5)
ERYTHROCYTE [DISTWIDTH] IN BLOOD BY AUTOMATED COUNT: 14.1 % (ref 11.5–14.5)
GFR SERPLBLD CREATININE-BSD FMLA CKD-EPI: >60 ML/MIN/1.73/M2
GLUCOSE SERPL-MCNC: 137 MG/DL (ref 70–110)
HCT VFR BLD AUTO: 33 % (ref 37–48.5)
HCT VFR BLD AUTO: 34.9 % (ref 37–48.5)
HGB BLD-MCNC: 10.5 GM/DL (ref 12–16)
HGB BLD-MCNC: 11.1 GM/DL (ref 12–16)
IMM GRANULOCYTES # BLD AUTO: 0.04 K/UL (ref 0–0.04)
IMM GRANULOCYTES # BLD AUTO: 0.07 K/UL (ref 0–0.04)
IMM GRANULOCYTES NFR BLD AUTO: 0.4 % (ref 0–0.5)
IMM GRANULOCYTES NFR BLD AUTO: 0.6 % (ref 0–0.5)
LYMPHOCYTES # BLD AUTO: 2.36 K/UL (ref 1–4.8)
LYMPHOCYTES # BLD AUTO: 2.87 K/UL (ref 1–4.8)
MCH RBC QN AUTO: 29.3 PG (ref 27–31)
MCH RBC QN AUTO: 29.4 PG (ref 27–31)
MCHC RBC AUTO-ENTMCNC: 31.8 G/DL (ref 32–36)
MCHC RBC AUTO-ENTMCNC: 31.8 G/DL (ref 32–36)
MCV RBC AUTO: 92 FL (ref 82–98)
MCV RBC AUTO: 92 FL (ref 82–98)
NUCLEATED RBC (/100WBC) (SMH): 0 /100 WBC
NUCLEATED RBC (/100WBC) (SMH): 0 /100 WBC
PLATELET # BLD AUTO: 209 K/UL (ref 150–450)
PLATELET # BLD AUTO: 233 K/UL (ref 150–450)
PMV BLD AUTO: 10.1 FL (ref 9.2–12.9)
PMV BLD AUTO: 9.9 FL (ref 9.2–12.9)
POCT GLUCOSE: 106 MG/DL (ref 70–110)
POCT GLUCOSE: 123 MG/DL (ref 70–110)
POTASSIUM SERPL-SCNC: 4.2 MMOL/L (ref 3.5–5.1)
PROT SERPL-MCNC: 6.5 GM/DL (ref 6–8.4)
RBC # BLD AUTO: 3.57 M/UL (ref 4–5.4)
RBC # BLD AUTO: 3.79 M/UL (ref 4–5.4)
RELATIVE EOSINOPHIL (SMH): 0.2 % (ref 0–8)
RELATIVE EOSINOPHIL (SMH): 0.6 % (ref 0–8)
RELATIVE LYMPHOCYTE (SMH): 23.2 % (ref 18–48)
RELATIVE LYMPHOCYTE (SMH): 24 % (ref 18–48)
RELATIVE MONOCYTE (SMH): 5.8 % (ref 4–15)
RELATIVE MONOCYTE (SMH): 7.5 % (ref 4–15)
RELATIVE NEUTROPHIL (SMH): 67.8 % (ref 38–73)
RELATIVE NEUTROPHIL (SMH): 69.3 % (ref 38–73)
SODIUM SERPL-SCNC: 141 MMOL/L (ref 136–145)
WBC # BLD AUTO: 12.38 K/UL (ref 3.9–12.7)
WBC # BLD AUTO: 9.85 K/UL (ref 3.9–12.7)

## 2025-06-21 PROCEDURE — 85025 COMPLETE CBC W/AUTO DIFF WBC: CPT

## 2025-06-21 PROCEDURE — 45378 DIAGNOSTIC COLONOSCOPY: CPT | Performed by: INTERNAL MEDICINE

## 2025-06-21 PROCEDURE — 63600175 PHARM REV CODE 636 W HCPCS

## 2025-06-21 PROCEDURE — 36415 COLL VENOUS BLD VENIPUNCTURE: CPT

## 2025-06-21 PROCEDURE — 37000008 HC ANESTHESIA 1ST 15 MINUTES: Performed by: INTERNAL MEDICINE

## 2025-06-21 PROCEDURE — 96376 TX/PRO/DX INJ SAME DRUG ADON: CPT | Mod: 59

## 2025-06-21 PROCEDURE — 80053 COMPREHEN METABOLIC PANEL: CPT

## 2025-06-21 PROCEDURE — 63600175 PHARM REV CODE 636 W HCPCS: Performed by: NURSE ANESTHETIST, CERTIFIED REGISTERED

## 2025-06-21 PROCEDURE — G0378 HOSPITAL OBSERVATION PER HR: HCPCS

## 2025-06-21 PROCEDURE — 25000003 PHARM REV CODE 250: Performed by: NURSE ANESTHETIST, CERTIFIED REGISTERED

## 2025-06-21 PROCEDURE — 85025 COMPLETE CBC W/AUTO DIFF WBC: CPT | Mod: 91

## 2025-06-21 RX ORDER — PROPOFOL 10 MG/ML
VIAL (ML) INTRAVENOUS
Status: DISCONTINUED | OUTPATIENT
Start: 2025-06-21 | End: 2025-06-21

## 2025-06-21 RX ORDER — PHENYLEPHRINE HYDROCHLORIDE 10 MG/ML
INJECTION INTRAVENOUS
Status: DISCONTINUED | OUTPATIENT
Start: 2025-06-21 | End: 2025-06-21

## 2025-06-21 RX ADMIN — PROPOFOL 70 MG: 10 INJECTION, EMULSION INTRAVENOUS at 08:06

## 2025-06-21 RX ADMIN — PROPOFOL 30 MG: 10 INJECTION, EMULSION INTRAVENOUS at 08:06

## 2025-06-21 RX ADMIN — PHENYLEPHRINE HYDROCHLORIDE 100 MCG: 10 INJECTION INTRAVENOUS at 08:06

## 2025-06-21 RX ADMIN — SODIUM CHLORIDE: 0.9 INJECTION, SOLUTION INTRAVENOUS at 08:06

## 2025-06-21 RX ADMIN — PANTOPRAZOLE SODIUM 40 MG: 40 INJECTION, POWDER, FOR SOLUTION INTRAVENOUS at 09:06

## 2025-06-21 NOTE — PLAN OF CARE
Problem: Gastrointestinal Bleeding  Goal: Optimal Coping with Acute Illness  Outcome: Met  Goal: Hemostasis  Outcome: Met     Problem: Adult Inpatient Plan of Care  Goal: Plan of Care Review  Outcome: Met  Goal: Patient-Specific Goal (Individualized)  Outcome: Met  Goal: Absence of Hospital-Acquired Illness or Injury  Outcome: Met  Goal: Optimal Comfort and Wellbeing  Outcome: Met  Goal: Readiness for Transition of Care  Outcome: Met     Problem: Diabetes Comorbidity  Goal: Blood Glucose Level Within Targeted Range  Outcome: Met

## 2025-06-21 NOTE — BRIEF OP NOTE
Colonoscopy done without difficulty.  Extensive diverticuli of the entire colon.  There was absolutely no blood in the colon.  There was some brown stool.  This implies the bleeding was very low in the colon.  If patient starts passing red blood this is acute new bleeding.  If this happens and emergent CTA of the abdomen should be done to localize the bleeding site.  Discharge to the room in good condition.  Would give her liquid diet and advanced.

## 2025-06-21 NOTE — ASSESSMENT & PLAN NOTE
Anemia is likely due to acute blood loss which was from rectal bleeding. Most recent hemoglobin and hematocrit are listed below.  Recent Labs     06/20/25  1556 06/21/25  0017 06/21/25  0246   HGB 11.0* 11.1* 10.5*   HCT 33.9* 34.9* 33.0*     Plan  - Monitor serial CBC: Every 8 hours  - Transfuse PRBC if patient becomes hemodynamically unstable, symptomatic or H/H drops below 7/21.  - Patient has not received any PRBC transfusions to date  - Patient's anemia is currently stable

## 2025-06-21 NOTE — ANESTHESIA POSTPROCEDURE EVALUATION
Anesthesia Post Evaluation    Patient: Shantal Orourke    Procedure(s) Performed: Procedure(s) (LRB):  COLONOSCOPY (N/A)    Final Anesthesia Type: general      Patient location during evaluation: GI PACU  Patient participation: Yes- Able to Participate  Level of consciousness: awake and alert  Post-procedure vital signs: reviewed and stable  Pain management: adequate  Airway patency: patent    PONV status at discharge: No PONV  Anesthetic complications: no      Cardiovascular status: stable  Respiratory status: unassisted  Hydration status: euvolemic  Follow-up not needed.              Vitals Value Taken Time   BP 95/58 06/21/25 08:50   Temp 36.7 °C (98 °F) 06/21/25 08:30   Pulse 80 06/21/25 08:50   Resp 11 06/21/25 08:49   SpO2 100 % 06/21/25 08:50   Vitals shown include unfiled device data.      Event Time   Out of Recovery 06/21/2025 08:53:08         Pain/Gudelia Score: No data recorded

## 2025-06-21 NOTE — ASSESSMENT & PLAN NOTE
Patient's FSGs are uncontrolled due to hyperglycemia on current medication regimen.  Last A1c reviewed-   Lab Results   Component Value Date    HGBA1C 6.1 (H) 09/16/2024     Most recent fingerstick glucose reviewed-   Recent Labs   Lab 06/21/25  0728 06/21/25  1138   POCTGLUCOSE 123* 106     Current correctional scale  Low  Maintain anti-hyperglycemic dose as follows-   Antihyperglycemics (From admission, onward)      Start     Stop Route Frequency Ordered    06/20/25 1002  insulin aspart U-100 pen 0-5 Units         -- SubQ Before meals & nightly PRN 06/20/25 0903          Hold Oral hypoglycemics while patient is in the hospital.

## 2025-06-21 NOTE — ANESTHESIA PREPROCEDURE EVALUATION
06/21/2025  Shantal Orourke is a 74 y.o., female.      Pre-op Assessment    I have reviewed the Patient Summary Reports.     I have reviewed the Nursing Notes. I have reviewed the NPO Status.   I have reviewed the Medications.     Review of Systems  Anesthesia Hx:             Denies Family Hx of Anesthesia complications.    Denies Personal Hx of Anesthesia complications.                    Social:  Former Smoker       Hematology/Oncology:    Oncology Normal    -- Anemia:                                  EENT/Dental:  EENT/Dental Normal           Cardiovascular:     Hypertension       CHF (Chronic diastolic per chart, but patient denies CHF)    hyperlipidemia    Asymptomatic heart murmur                           Pulmonary:  Pulmonary Normal                       Renal/:  Renal/ Normal                 Hepatic/GI:   PUD,  GERD  Hepatitis, A Admitted for rectal bleeding.  History of upper and lower GI bleeds.             Musculoskeletal:  Musculoskeletal Normal                Neurological:  Neurology Normal                                      Endocrine:  Diabetes, type 2           Psych:  Psychiatric Normal                    Physical Exam  General: Well nourished, Cooperative, Alert and Oriented    Airway:  Mallampati: II   Mouth Opening: Normal  TM Distance: > 6 cm  Tongue: Normal  Neck ROM: Normal ROM    Dental:  Intact    Chest/Lungs:  Clear to auscultation, Normal Respiratory Rate    Heart:  Rate: Normal  Rhythm: Regular Rhythm  Sounds: Normal  Murmur: Systolic;        Anesthesia Plan  Type of Anesthesia, risks & benefits discussed:    Anesthesia Type: Gen Natural Airway  Intra-op Monitoring Plan: Standard ASA Monitors  Induction:  IV  Informed Consent: Informed consent signed with the Patient and all parties understand the risks and agree with anesthesia plan.  All questions answered.   ASA Score: 3  Emergent    Ready For Surgery From Anesthesia Perspective.     .

## 2025-06-21 NOTE — ASSESSMENT & PLAN NOTE
Patient's blood pressure range in the last 24 hours was: BP  Min: 84/48  Max: 134/67.The patient's inpatient anti-hypertensive regimen is listed below:  Current Antihypertensives       Plan  - BP is controlled, no changes needed to their regimen  -

## 2025-06-21 NOTE — TRANSFER OF CARE
"Anesthesia Transfer of Care Note    Patient: Shantal Orourek    Procedure(s) Performed: Procedure(s) (LRB):  COLONOSCOPY (N/A)    Patient location: GI    Anesthesia Type: general    Transport from OR: Transported from OR on 2-3 L/min O2 by NC with adequate spontaneous ventilation    Post pain: adequate analgesia    Post assessment: no apparent anesthetic complications    Post vital signs: stable    Level of consciousness: awake    Nausea/Vomiting: no nausea/vomiting    Complications: none    Transfer of care protocol was followed    Last vitals: Visit Vitals  /65   Pulse 79   Temp 36.8 °C (98.3 °F) (Oral)   Resp 18   Ht 5' 5" (1.651 m)   Wt 80 kg (176 lb 4.8 oz)   SpO2 97%   BMI 29.34 kg/m²     "

## 2025-06-21 NOTE — HOSPITAL COURSE
Pt with diverticulosis, ulcer, chf, hld, presented with complaints of gib. Did not require any blood transfusion. GI consulted, underwent colonoscopy on 6/21/25:  Extensive diverticuli of the entire colon. There was absolutely no blood in the colon. This implies the bleeding was very low in the colon. No recurrence of bleeding during hospital stay and patient symptoms improved. Pt stable for dc, cleared by GI. Instructed to return to the ED if has GIB again, pt verbalized understanding.     Physical Exam  Cardiovascular:      Rate and Rhythm: Normal rate and regular rhythm.   Pulmonary:      Effort: Pulmonary effort is normal. No respiratory distress.      Breath sounds: Normal breath sounds. No wheezing.

## 2025-06-21 NOTE — PLAN OF CARE
Problem: Gastrointestinal Bleeding  Goal: Optimal Coping with Acute Illness  Outcome: Progressing  Goal: Hemostasis  Outcome: Progressing     Problem: Adult Inpatient Plan of Care  Goal: Plan of Care Review  Outcome: Progressing  Goal: Patient-Specific Goal (Individualized)  Outcome: Progressing  Goal: Absence of Hospital-Acquired Illness or Injury  Outcome: Progressing  Goal: Optimal Comfort and Wellbeing  Outcome: Progressing  Goal: Readiness for Transition of Care  Outcome: Progressing     Problem: Diabetes Comorbidity  Goal: Blood Glucose Level Within Targeted Range  Outcome: Progressing     Problem: Sepsis/Septic Shock  Goal: Optimal Coping  Outcome: Progressing  Goal: Absence of Bleeding  Outcome: Progressing  Goal: Blood Glucose Level Within Targeted Range  Outcome: Progressing  Goal: Absence of Infection Signs and Symptoms  Outcome: Progressing  Goal: Optimal Nutrition Intake  Outcome: Progressing

## 2025-06-21 NOTE — PLAN OF CARE
[facility]  Initial Discharge Assessment     Assessment completed at bedside with Pt, and all information on FaceSheet confirmed, including demographics, PCP, pharmacy and insurance.  Pt  has not addressed advance directives. She currently  has her adult son living with her at this time (Sae Orourke [phone] ). His PCP is  Elo Ervin and preferred pharmacy is [facility].  He denies any HH/HD/Blood Thinners.  For transportation to appointments, she usually drives herself to and from appointments .  For transportation at discharge, she will provide. If not her son.  Plan for discharge is to return back home .  Case Management to continue to follow for discharge planning needs.    Primary Care Provider: Elo Ervin MD    Admission Diagnosis: Rectal bleeding [K62.5]    Admission Date: 6/20/2025  Expected Discharge Date:     Transition of Care Barriers: None    Payor: MEDICARE / Plan: MEDICARE PART A & B / Product Type: Government /     Extended Emergency Contact Information  Primary Emergency Contact: Charmaine Reid  Home Phone: [phone]  Mobile Phone: [phone]  Relation: Daughter  Preferred language: English   needed? No    Discharge Plan A: Home with family  Discharge Plan B: Home with family      [facility] - [place] - [address]  [address]  [place] [address]  Phone: [phone] Fax: [phone]    [facility]  [address]  [place] [address]  Phone: [phone] Fax: [phone]      Initial Assessment (most recent)       Adult Discharge Assessment - 06/21/25 0916          Discharge Assessment    Assessment Type Discharge Planning Assessment     Confirmed/corrected address, phone number and insurance Yes     Confirmed Demographics Correct on Facesheet     Source of Information patient     People in Home child(karrie), dependent     Name(s) of People in Home Sae Orourke [phone]      Facility Arrived From: home     Do you expect to return to your current living situation? Yes     Do you have help at home or someone to help you manage your care at home? Yes     Who are your caregiver(s) and their phone number(s)? Sae Orourke [phone]     Prior to hospitilization cognitive status: Alert/Oriented     Current cognitive status: Alert/Oriented     Walking or Climbing Stairs Difficulty no     Dressing/Bathing Difficulty no     Home Accessibility wheelchair accessible     Home Layout Able to live on 1st floor     Equipment Currently Used at Home none     Readmission within 30 days? No     Patient currently being followed by outpatient case management? No     Do you currently have service(s) that help you manage your care at home? No     Do you take prescription medications? Yes     Do you have prescription coverage? Yes     Do you have any problems affording any of your prescribed medications? TBD     Is the patient taking medications as prescribed? yes     Who is going to help you get home at discharge? If able she will drive herself. If not, her son Sae Orourke [phone]     How do you get to doctors appointments? car, drives self     Are you on dialysis? No     Do you take coumadin? No     Discharge Plan A Home with family     Discharge Plan B Home with family     DME Needed Upon Discharge  none     Discharge Plan discussed with: Patient     Transition of Care Barriers None

## 2025-06-21 NOTE — DISCHARGE SUMMARY
[facility]  Discharge Summary      Patient Name: Shantal Orourke  MRN: [id number]  SUE: [id number]  Patient Class: OP- Observation  Admission Date: 6/20/2025  Hospital Length of Stay: 0 days  Discharge Date and Time: 6/21/2025  3:32 PM  Attending Physician: No att. providers found   Discharging Provider: Lori Powell MD  Primary Care Provider: Elo Ervin MD    Primary Care Team: Networked reference to record PCT     HPI:   Shantal Orourke is a 74 year old female with a past medical history of anemia, GI bleed with diverticulosis, gastric ulcer, enteritis 12/2024, peptic ulcer disease, mild reactive airway disease, hyperlipidemia, abdominal aortic atherosclerosis, heart murmur, hepatitis A, hypertension, diabetes mellitus, congestive heart failure with diastolic dysfunction, GERD, and hypertension on PRN midodrine who presents with complaints of 1 episode of bloody stools with dark clots prior to arrival.  Her last colonoscopy was in 2022 which noted diverticulosis in the entire colon and 1 with a clot and active bleeding after clot removal, given epinephrine and hemoclip placement. Her last EGD in 2022 noted normal esophagus with small hiatal hernia, gastritis, oozing gastric ulcer and was treated with cautery and to repeat follow-up EGD 8 weeks to check for healed ulcer. However no follow-up EGD noted per chart. In December 2024 noted to have enteritis and was discharged home on Cipro and Flagyl. She denies chest pain, fever, chills, dizziness, hematuria, hematemesis, abdominal pain, nausea, or vomiting. Upon assessment she reports frustration with care in ED during last admission and states she will sign out AMA if everything remains stable.  CBC white count 7.7 and H&H 11.4/35.8 with platelets 196.  Prior H&H on 06/04 13.7/42.7.  CMP with creatinine 0.8 and glucose 155. Stool for occult blood pending.  She is hemodynamically stable.  GI consult placed.  Admitted to hospital  medicine for further management and treatment.      Procedure(s) (LRB):  COLONOSCOPY (N/A)      Hospital Course:   Pt with diverticulosis, ulcer, chf, hld, presented with complaints of gib. Did not require any blood transfusion. GI consulted, underwent colonoscopy on 6/21/25:  Extensive diverticuli of the entire colon. There was absolutely no blood in the colon. This implies the bleeding was very low in the colon. No recurrence of bleeding during hospital stay and patient symptoms improved. Pt stable for dc, cleared by GI. Instructed to return to the ED if has GIB again, pt verbalized understanding.     Physical Exam  Cardiovascular:      Rate and Rhythm: Normal rate and regular rhythm.   Pulmonary:      Effort: Pulmonary effort is normal. No respiratory distress.      Breath sounds: Normal breath sounds. No wheezing.      Goals of Care Treatment Preferences:  Code Status: Full Code         Consults:   Consults (From admission, onward)          Status Ordering Provider     Inpatient consult to Gastroenterology  Once        Provider:  Jalen Sesay III, MD    Completed SHIRLEY APONTE            Assessment & Plan  Primary hypertension  Patient's blood pressure range in the last 24 hours was: BP  Min: 84/48  Max: 134/67.The patient's inpatient anti-hypertensive regimen is listed below:  Current Antihypertensives       Plan  - BP is controlled, no changes needed to their regimen  -   Gastroesophageal reflux disease without esophagitis  -chronic history noted  -continue PPI    Diastolic dysfunction  Patient is identified as having Diastolic (HFpEF) heart failure that is Chronic. CHF is currently controlled. Latest ECHO performed and demonstrates- No results found for this or any previous visit.  .monitor clinical status closely. Monitor on telemetry. Patient is  CHF pathway.  Monitor strict Is&Os and daily weights.  Place on fluid restriction of . Cardiology  consulted. Continue to stress to patient importance of  "self efficacy and  on diet for CHF. Last BNP reviewed- and noted below No results for input(s): "BNP", "BNPTRIAGEBLO" in the last 168 hours..          Type 2 diabetes mellitus  Patient's FSGs are uncontrolled due to hyperglycemia on current medication regimen.  Last A1c reviewed-   Lab Results   Component Value Date    HGBA1C 6.1 (H) 09/16/2024     Most recent fingerstick glucose reviewed-   Recent Labs   Lab 06/21/25  0728 06/21/25  1138   POCTGLUCOSE 123* 106     Current correctional scale  Low  Maintain anti-hyperglycemic dose as follows-   Antihyperglycemics (From admission, onward)      Start     Stop Route Frequency Ordered    06/20/25 1002  insulin aspart U-100 pen 0-5 Units         -- SubQ Before meals & nightly PRN 06/20/25 0903          Hold Oral hypoglycemics while patient is in the hospital.  History of diverticulosis  -chronic history noted    History of gastric ulcer  -chronic condition noted  -continue PPI    Anemia  Anemia is likely due to acute blood loss which was from rectal bleeding. Most recent hemoglobin and hematocrit are listed below.  Recent Labs     06/20/25  1556 06/21/25  0017 06/21/25  0246   HGB 11.0* 11.1* 10.5*   HCT 33.9* 34.9* 33.0*     Plan  - Monitor serial CBC: Every 8 hours  - Transfuse PRBC if patient becomes hemodynamically unstable, symptomatic or H/H drops below 7/21.  - Patient has not received any PRBC transfusions to date  - Patient's anemia is currently stable    Final Active Diagnoses:    Diagnosis Date Noted POA    History of diverticulosis [Z87.19] 06/20/2025 Not Applicable     Chronic    History of gastric ulcer [Z87.11] 06/20/2025 Not Applicable     Chronic    Anemia [D64.9] 06/20/2025 Yes    Type 2 diabetes mellitus [E11.9] 12/29/2024 Yes    Diastolic dysfunction [I51.89] 12/27/2022 Yes    Gastroesophageal reflux disease without esophagitis [K21.9] 03/24/2022 Yes    Primary hypertension [I10] 01/26/2021 Yes      Problems Resolved During this Admission:    " Diagnosis Date Noted Date Resolved POA    PRINCIPAL PROBLEM:  Rectal bleeding [K62.5] 06/20/2025 06/21/2025 Yes       Discharged Condition: stable    Disposition: Home or Self Care    Follow Up:   Follow-up Information       Elo Ervin MD Follow up in 3 day(s).    Specialties: Hospitalist, Internal Medicine  Contact information:  [address]  [address]  [facility] [address]  [phone]                           Patient Instructions:      Diet Adult Regular     Notify your health care provider if you experience any of the following:  temperature >100.4     Notify your health care provider if you experience any of the following:  persistent nausea and vomiting or diarrhea     Notify your health care provider if you experience any of the following:  severe uncontrolled pain     Notify your health care provider if you experience any of the following:  redness, tenderness, or signs of infection (pain, swelling, redness, odor or green/yellow discharge around incision site)     Activity as tolerated       Significant Diagnostic Studies: Labs: CMP   Recent Labs   Lab 06/20/25  0641 06/21/25  0246    141   K 4.4 4.2   * 110   CO2 19* 21*   * 137*   BUN 23 21   CREATININE 0.8 0.9   CALCIUM 8.8 9.0   PROT 6.5 6.5   ALBUMIN 3.8 3.9   BILITOT 0.6 0.8   ALKPHOS 78 84   AST 17 14   ALT 17 17   ANIONGAP 9 10    and CBC   Recent Labs   Lab 06/20/25  1556 06/21/25  0017 06/21/25  0246   WBC 9.98 12.38 9.85   HGB 11.0* 11.1* 10.5*   HCT 33.9* 34.9* 33.0*    233 209       Pending Diagnostic Studies:       None           Medications:  Reconciled Home Medications:      Medication List        CONTINUE taking these medications      albuterol 90 mcg/actuation inhaler  Commonly known as: PROVENTIL/VENTOLIN HFA  Inhale 2 puffs into the lungs every 4 (four) hours as needed for Shortness of Breath.     calcium-vitamin D3 500 mg-5 mcg (200 unit) per tablet  Commonly known as: OS-CHARLES 500 + D3  Take 1  tablet by mouth Daily.     cetirizine 10 MG tablet  Commonly known as: ZYRTEC  Take 10 mg by mouth every morning.     co-enzyme Q-10 30 mg capsule  Take 30 mg by mouth once daily.     eszopiclone 2 MG Tab  Commonly known as: LUNESTA  Take 2 mg by mouth nightly.     montelukast 10 mg tablet  Commonly known as: SINGULAIR  Take 10 mg by mouth every evening.     rosuvastatin 20 MG tablet  Commonly known as: CRESTOR  Take 20 mg by mouth every evening.              Indwelling Lines/Drains at time of discharge:   Lines/Drains/Airways       None                       Time spent on the discharge of patient: 38 minutes         Lori Powell MD  Department of Hospital Medicine  [facility]

## 2025-06-21 NOTE — [employer] PATIENT INSTRUCTIONS
Discharge Summary/Instructions after an Endoscopic Procedure  Patient Name: Shantal Orourke  Patient MRN: [id number]  Patient YOB: 1950 Saturday, June 21, 2025  German Eckert MD  RESTRICTIONS:  During your procedure today, you received medications for sedation.  These   medications may affect your judgment, balance and coordination.  Therefore,   for 24 hours, you have the following restrictions:   - DO NOT drive a car, operate machinery, make legal/financial decisions,   sign important papers or drink alcohol.    ACTIVITY:  Today: no heavy lifting, straining or running due to procedural   sedation/anesthesia.  The following day: return to full activity including work.  DIET:  Eat and drink normally unless instructed otherwise.     TREATMENT FOR COMMON SIDE EFFECTS:  - Mild abdominal pain, nausea, belching, bloating or excessive gas:  rest,   eat lightly and use a heating pad.  - Sore Throat: treat with throat lozenges and/or gargle with warm salt   water.  - Because air was used during the procedure, expelling large amounts of air   from your rectum or belching is normal.  - If a bowel prep was taken, you may not have a bowel movement for 1-3 days.    This is normal.  SYMPTOMS TO WATCH FOR AND REPORT TO YOUR PHYSICIAN:  1. Abdominal pain or bloating, other than gas cramps.  2. Chest pain.  3. Back pain.  4. Signs of infection such as: chills or fever occurring within 24 hours   after the procedure.  5. Rectal bleeding, which would show as bright red, maroon, or black stools.   (A tablespoon of blood from the rectum is not serious, especially if   hemorrhoids are present.)  6. Vomiting.  7. Weakness or dizziness.  GO DIRECTLY TO THE NEAREST EMERGENCY ROOM IF YOU HAVE ANY OF THE FOLLOWING:      Difficulty breathing              Chills and/or fever over 101 F   Persistent vomiting and/or vomiting blood   Severe abdominal pain   Severe chest pain   Black, tarry stools   Bleeding- more than one  tablespoon   Any other symptom or condition that you feel may need urgent attention  Your doctor recommends these additional instructions:  If any biopsies were taken, your doctors clinic will contact you in 1 to 2   weeks with any results.  - Return patient to hospital [facility] for ongoing care.   - If has recuurent bleeding do stat CTA of intestinal vessels.  - Avoid aspirin, nsaids, or blood thinners.  For questions, problems or results please call your physician - German Eckert MD at Work:  [phone].  [facility], EMERGENCY ROOM PHONE NUMBER: [phone]  IF A COMPLICATION OR EMERGENCY SITUATION ARISES AND YOU ARE UNABLE TO REACH   YOUR PHYSICIAN - GO DIRECTLY TO THE EMERGENCY ROOM.  German Eckert MD  6/21/2025 8:35:47 AM  This report has been verified and signed electronically.  Dear patient,  As a result of recent federal legislation (The Federal Cures Act), you may   receive lab or pathology results from your procedure in your [facility]   account before your physician is able to contact you. Your physician or   their representative will relay the results to you with their   recommendations at their soonest availability.  Thank you,  [employer]

## 2025-06-21 NOTE — PLAN OF CARE
Patient cleared for discharge from case management standpoint.    Chart and discharge orders reviewed.  Patient discharged with no further case management needs.       06/21/25 1500   Final Note   Assessment Type Final Discharge Note   Anticipated Discharge Disposition Home   What phone number can be called within the next 1-3 days to see how you are doing after discharge? [phone]   Post-Acute Status   Discharge Delays None known at this time

## 2025-06-22 NOTE — PLAN OF CARE
06/22/25 1056   PAGE Message   Medicare Outpatient and Observation Notification regarding financial responsibility Given to patient/caregiver;Explained to patient/caregiver;Signed/date by patient/caregiver   Date PAGE was signed 06/21/25   Time PAGE was signed 0928

## (undated) DEVICE — GLOVE SURGEONS ULTRA TOUCH 6.5

## (undated) DEVICE — GLOVE SURG ULTRA TOUCH 7.5

## (undated) DEVICE — SYS LABEL CORRECT MED

## (undated) DEVICE — KNIFE SLIT PHACO 2.4MM

## (undated) DEVICE — BLADE SURG BVL ANG COAX 2.4MM

## (undated) DEVICE — SHIELD EYE PLASTIC 3100G

## (undated) DEVICE — TIP PHACO 45 DEGREE KELMAN

## (undated) DEVICE — TIP I & A

## (undated) DEVICE — SYR DISP LL 5CC

## (undated) DEVICE — CYSTOTOME IRRIG 24G 13MM

## (undated) DEVICE — SYR LUER LOCK 1CC

## (undated) DEVICE — DRAPE STERI INCISE MED 130X130

## (undated) DEVICE — KNIFE ANGLE 1MM

## (undated) DEVICE — SLEEVE ULTRA INFUSION

## (undated) DEVICE — PACK CUSTOM EYE KIT

## (undated) DEVICE — UNDERGLOVES BIOGEL PI SZ 7 LF

## (undated) DEVICE — NDL HYPO A BEVEL 30X1/2

## (undated) DEVICE — NDL HYPODERMIC BLUNT 18G 1.5IN

## (undated) DEVICE — SOL BETADINE 5%